# Patient Record
Sex: MALE | Race: ASIAN | NOT HISPANIC OR LATINO
[De-identification: names, ages, dates, MRNs, and addresses within clinical notes are randomized per-mention and may not be internally consistent; named-entity substitution may affect disease eponyms.]

---

## 2014-05-04 RX ORDER — ATORVASTATIN CALCIUM 80 MG/1
1 TABLET, FILM COATED ORAL
Qty: 0 | Refills: 0 | DISCHARGE
Start: 2014-05-04

## 2014-05-04 RX ORDER — METOPROLOL TARTRATE 50 MG
1 TABLET ORAL
Qty: 0 | Refills: 0 | DISCHARGE
Start: 2014-05-04

## 2017-02-06 ENCOUNTER — APPOINTMENT (OUTPATIENT)
Dept: VASCULAR SURGERY | Facility: CLINIC | Age: 52
End: 2017-02-06

## 2017-02-06 VITALS
HEART RATE: 70 BPM | WEIGHT: 250 LBS | HEIGHT: 68 IN | BODY MASS INDEX: 37.89 KG/M2 | DIASTOLIC BLOOD PRESSURE: 87 MMHG | TEMPERATURE: 98 F | SYSTOLIC BLOOD PRESSURE: 140 MMHG

## 2017-02-06 DIAGNOSIS — E11.9 TYPE 2 DIABETES MELLITUS W/OUT COMPLICATIONS: ICD-10-CM

## 2017-02-06 DIAGNOSIS — I25.10 ATHEROSCLEROTIC HEART DISEASE OF NATIVE CORONARY ARTERY W/OUT ANGINA PECTORIS: ICD-10-CM

## 2017-02-06 DIAGNOSIS — Z86.79 PERSONAL HISTORY OF OTHER DISEASES OF THE CIRCULATORY SYSTEM: ICD-10-CM

## 2017-02-06 DIAGNOSIS — M79.89 OTHER SPECIFIED SOFT TISSUE DISORDERS: ICD-10-CM

## 2017-02-06 DIAGNOSIS — E78.00 PURE HYPERCHOLESTEROLEMIA, UNSPECIFIED: ICD-10-CM

## 2017-02-06 DIAGNOSIS — F31.9 BIPOLAR DISORDER, UNSPECIFIED: ICD-10-CM

## 2023-01-11 ENCOUNTER — INPATIENT (INPATIENT)
Facility: HOSPITAL | Age: 58
LOS: 0 days | Discharge: ANOTHER HOSPITAL | End: 2023-01-12
Attending: INTERNAL MEDICINE | Admitting: INTERNAL MEDICINE
Payer: MEDICARE

## 2023-01-11 ENCOUNTER — TRANSCRIPTION ENCOUNTER (OUTPATIENT)
Age: 58
End: 2023-01-11

## 2023-01-11 VITALS
TEMPERATURE: 98 F | HEART RATE: 93 BPM | OXYGEN SATURATION: 96 % | DIASTOLIC BLOOD PRESSURE: 85 MMHG | RESPIRATION RATE: 18 BRPM | SYSTOLIC BLOOD PRESSURE: 149 MMHG

## 2023-01-11 DIAGNOSIS — R94.39 ABNORMAL RESULT OF OTHER CARDIOVASCULAR FUNCTION STUDY: ICD-10-CM

## 2023-01-11 LAB
ANION GAP SERPL CALC-SCNC: 11 MMOL/L — SIGNIFICANT CHANGE UP (ref 7–14)
BUN SERPL-MCNC: 13 MG/DL — SIGNIFICANT CHANGE UP (ref 7–23)
CALCIUM SERPL-MCNC: 9.3 MG/DL — SIGNIFICANT CHANGE UP (ref 8.4–10.5)
CHLORIDE SERPL-SCNC: 103 MMOL/L — SIGNIFICANT CHANGE UP (ref 98–107)
CO2 SERPL-SCNC: 26 MMOL/L — SIGNIFICANT CHANGE UP (ref 22–31)
CREAT SERPL-MCNC: 0.66 MG/DL — SIGNIFICANT CHANGE UP (ref 0.5–1.3)
EGFR: 109 ML/MIN/1.73M2 — SIGNIFICANT CHANGE UP
GLUCOSE BLDC GLUCOMTR-MCNC: 135 MG/DL — HIGH (ref 70–99)
GLUCOSE BLDC GLUCOMTR-MCNC: 150 MG/DL — HIGH (ref 70–99)
GLUCOSE BLDC GLUCOMTR-MCNC: 205 MG/DL — HIGH (ref 70–99)
GLUCOSE BLDC GLUCOMTR-MCNC: 219 MG/DL — HIGH (ref 70–99)
GLUCOSE SERPL-MCNC: 214 MG/DL — HIGH (ref 70–99)
HCT VFR BLD CALC: 35.2 % — LOW (ref 39–50)
HGB BLD-MCNC: 11.3 G/DL — LOW (ref 13–17)
MCHC RBC-ENTMCNC: 27.6 PG — SIGNIFICANT CHANGE UP (ref 27–34)
MCHC RBC-ENTMCNC: 32.1 GM/DL — SIGNIFICANT CHANGE UP (ref 32–36)
MCV RBC AUTO: 85.9 FL — SIGNIFICANT CHANGE UP (ref 80–100)
NRBC # BLD: 0 /100 WBCS — SIGNIFICANT CHANGE UP (ref 0–0)
NRBC # FLD: 0 K/UL — SIGNIFICANT CHANGE UP (ref 0–0)
PA ADP PRP-ACNC: 62 PRU — LOW (ref 194–418)
PLATELET # BLD AUTO: 271 K/UL — SIGNIFICANT CHANGE UP (ref 150–400)
POTASSIUM SERPL-MCNC: 4.5 MMOL/L — SIGNIFICANT CHANGE UP (ref 3.5–5.3)
POTASSIUM SERPL-SCNC: 4.5 MMOL/L — SIGNIFICANT CHANGE UP (ref 3.5–5.3)
RBC # BLD: 4.1 M/UL — LOW (ref 4.2–5.8)
RBC # FLD: 15 % — HIGH (ref 10.3–14.5)
SODIUM SERPL-SCNC: 140 MMOL/L — SIGNIFICANT CHANGE UP (ref 135–145)
WBC # BLD: 6.9 K/UL — SIGNIFICANT CHANGE UP (ref 3.8–10.5)
WBC # FLD AUTO: 6.9 K/UL — SIGNIFICANT CHANGE UP (ref 3.8–10.5)

## 2023-01-11 PROCEDURE — 93010 ELECTROCARDIOGRAM REPORT: CPT

## 2023-01-11 RX ORDER — DEXTROSE 50 % IN WATER 50 %
15 SYRINGE (ML) INTRAVENOUS ONCE
Refills: 0 | Status: DISCONTINUED | OUTPATIENT
Start: 2023-01-11 | End: 2023-01-12

## 2023-01-11 RX ORDER — METFORMIN HYDROCHLORIDE 850 MG/1
1 TABLET ORAL
Qty: 0 | Refills: 0 | DISCHARGE

## 2023-01-11 RX ORDER — GLUCAGON INJECTION, SOLUTION 0.5 MG/.1ML
1 INJECTION, SOLUTION SUBCUTANEOUS ONCE
Refills: 0 | Status: DISCONTINUED | OUTPATIENT
Start: 2023-01-11 | End: 2023-01-12

## 2023-01-11 RX ORDER — PANTOPRAZOLE SODIUM 20 MG/1
40 TABLET, DELAYED RELEASE ORAL
Refills: 0 | Status: DISCONTINUED | OUTPATIENT
Start: 2023-01-11 | End: 2023-01-12

## 2023-01-11 RX ORDER — DEXTROSE 50 % IN WATER 50 %
25 SYRINGE (ML) INTRAVENOUS ONCE
Refills: 0 | Status: DISCONTINUED | OUTPATIENT
Start: 2023-01-11 | End: 2023-01-12

## 2023-01-11 RX ORDER — ASPIRIN/CALCIUM CARB/MAGNESIUM 324 MG
81 TABLET ORAL DAILY
Refills: 0 | Status: DISCONTINUED | OUTPATIENT
Start: 2023-01-11 | End: 2023-01-12

## 2023-01-11 RX ORDER — FAMOTIDINE 10 MG/ML
1 INJECTION INTRAVENOUS
Qty: 0 | Refills: 0 | DISCHARGE

## 2023-01-11 RX ORDER — ATORVASTATIN CALCIUM 80 MG/1
40 TABLET, FILM COATED ORAL AT BEDTIME
Refills: 0 | Status: DISCONTINUED | OUTPATIENT
Start: 2023-01-11 | End: 2023-01-12

## 2023-01-11 RX ORDER — INSULIN GLARGINE 100 [IU]/ML
68 INJECTION, SOLUTION SUBCUTANEOUS AT BEDTIME
Refills: 0 | Status: DISCONTINUED | OUTPATIENT
Start: 2023-01-11 | End: 2023-01-11

## 2023-01-11 RX ORDER — INSULIN GLARGINE 100 [IU]/ML
68 INJECTION, SOLUTION SUBCUTANEOUS
Qty: 0 | Refills: 0 | DISCHARGE

## 2023-01-11 RX ORDER — INSULIN LISPRO 100/ML
VIAL (ML) SUBCUTANEOUS AT BEDTIME
Refills: 0 | Status: DISCONTINUED | OUTPATIENT
Start: 2023-01-11 | End: 2023-01-12

## 2023-01-11 RX ORDER — PRASUGREL 5 MG/1
1 TABLET, FILM COATED ORAL
Qty: 0 | Refills: 0 | DISCHARGE

## 2023-01-11 RX ORDER — ZOLPIDEM TARTRATE 10 MG/1
1 TABLET ORAL
Qty: 0 | Refills: 0 | DISCHARGE

## 2023-01-11 RX ORDER — SODIUM CHLORIDE 9 MG/ML
1000 INJECTION, SOLUTION INTRAVENOUS
Refills: 0 | Status: DISCONTINUED | OUTPATIENT
Start: 2023-01-11 | End: 2023-01-12

## 2023-01-11 RX ORDER — QUETIAPINE FUMARATE 200 MG/1
1 TABLET, FILM COATED ORAL
Qty: 0 | Refills: 0 | DISCHARGE
Start: 2023-01-11

## 2023-01-11 RX ORDER — DIVALPROEX SODIUM 500 MG/1
1500 TABLET, DELAYED RELEASE ORAL AT BEDTIME
Refills: 0 | Status: DISCONTINUED | OUTPATIENT
Start: 2023-01-11 | End: 2023-01-12

## 2023-01-11 RX ORDER — SODIUM CHLORIDE 9 MG/ML
3 INJECTION INTRAMUSCULAR; INTRAVENOUS; SUBCUTANEOUS EVERY 8 HOURS
Refills: 0 | Status: DISCONTINUED | OUTPATIENT
Start: 2023-01-11 | End: 2023-01-12

## 2023-01-11 RX ORDER — ESCITALOPRAM OXALATE 10 MG/1
10 TABLET, FILM COATED ORAL AT BEDTIME
Refills: 0 | Status: DISCONTINUED | OUTPATIENT
Start: 2023-01-11 | End: 2023-01-12

## 2023-01-11 RX ORDER — INSULIN LISPRO 100/ML
VIAL (ML) SUBCUTANEOUS
Refills: 0 | Status: DISCONTINUED | OUTPATIENT
Start: 2023-01-11 | End: 2023-01-12

## 2023-01-11 RX ORDER — INSULIN LISPRO 100/ML
0 VIAL (ML) SUBCUTANEOUS
Qty: 0 | Refills: 0 | DISCHARGE
Start: 2023-01-11

## 2023-01-11 RX ORDER — QUETIAPINE FUMARATE 200 MG/1
400 TABLET, FILM COATED ORAL AT BEDTIME
Refills: 0 | Status: DISCONTINUED | OUTPATIENT
Start: 2023-01-11 | End: 2023-01-12

## 2023-01-11 RX ORDER — DAPAGLIFLOZIN 10 MG/1
1 TABLET, FILM COATED ORAL
Qty: 0 | Refills: 0 | DISCHARGE

## 2023-01-11 RX ORDER — INSULIN LISPRO 100/ML
17 VIAL (ML) SUBCUTANEOUS
Qty: 0 | Refills: 0 | DISCHARGE
Start: 2023-01-11

## 2023-01-11 RX ORDER — INSULIN ASPART 100 [IU]/ML
10 INJECTION, SOLUTION SUBCUTANEOUS
Qty: 0 | Refills: 0 | DISCHARGE

## 2023-01-11 RX ORDER — HALOPERIDOL DECANOATE 100 MG/ML
20 INJECTION INTRAMUSCULAR AT BEDTIME
Refills: 0 | Status: DISCONTINUED | OUTPATIENT
Start: 2023-01-11 | End: 2023-01-12

## 2023-01-11 RX ORDER — QUETIAPINE FUMARATE 200 MG/1
2 TABLET, FILM COATED ORAL
Qty: 0 | Refills: 0 | DISCHARGE

## 2023-01-11 RX ORDER — PANTOPRAZOLE SODIUM 20 MG/1
1 TABLET, DELAYED RELEASE ORAL
Qty: 0 | Refills: 0 | DISCHARGE

## 2023-01-11 RX ORDER — FUROSEMIDE 40 MG
20 TABLET ORAL AT BEDTIME
Refills: 0 | Status: DISCONTINUED | OUTPATIENT
Start: 2023-01-11 | End: 2023-01-12

## 2023-01-11 RX ORDER — SODIUM CHLORIDE 9 MG/ML
500 INJECTION INTRAMUSCULAR; INTRAVENOUS; SUBCUTANEOUS
Refills: 0 | Status: DISCONTINUED | OUTPATIENT
Start: 2023-01-11 | End: 2023-01-12

## 2023-01-11 RX ORDER — BENZTROPINE MESYLATE 1 MG
2 TABLET ORAL
Refills: 0 | Status: DISCONTINUED | OUTPATIENT
Start: 2023-01-11 | End: 2023-01-12

## 2023-01-11 RX ORDER — METOPROLOL TARTRATE 50 MG
50 TABLET ORAL AT BEDTIME
Refills: 0 | Status: DISCONTINUED | OUTPATIENT
Start: 2023-01-11 | End: 2023-01-12

## 2023-01-11 RX ORDER — INSULIN GLARGINE 100 [IU]/ML
68 INJECTION, SOLUTION SUBCUTANEOUS
Qty: 0 | Refills: 0 | DISCHARGE
Start: 2023-01-11

## 2023-01-11 RX ORDER — INSULIN HUMAN 4-8-12(60)
16 KIT INHALATION
Qty: 0 | Refills: 0 | DISCHARGE

## 2023-01-11 RX ORDER — AMLODIPINE BESYLATE 2.5 MG/1
10 TABLET ORAL AT BEDTIME
Refills: 0 | Status: DISCONTINUED | OUTPATIENT
Start: 2023-01-11 | End: 2023-01-12

## 2023-01-11 RX ORDER — FAMOTIDINE 10 MG/ML
20 INJECTION INTRAVENOUS AT BEDTIME
Refills: 0 | Status: DISCONTINUED | OUTPATIENT
Start: 2023-01-11 | End: 2023-01-12

## 2023-01-11 RX ORDER — DEXTROSE 50 % IN WATER 50 %
12.5 SYRINGE (ML) INTRAVENOUS ONCE
Refills: 0 | Status: DISCONTINUED | OUTPATIENT
Start: 2023-01-11 | End: 2023-01-12

## 2023-01-11 RX ORDER — INSULIN GLARGINE 100 [IU]/ML
60 INJECTION, SOLUTION SUBCUTANEOUS AT BEDTIME
Refills: 0 | Status: DISCONTINUED | OUTPATIENT
Start: 2023-01-11 | End: 2023-01-12

## 2023-01-11 RX ADMIN — ESCITALOPRAM OXALATE 10 MILLIGRAM(S): 10 TABLET, FILM COATED ORAL at 22:06

## 2023-01-11 RX ADMIN — HALOPERIDOL DECANOATE 20 MILLIGRAM(S): 100 INJECTION INTRAMUSCULAR at 22:05

## 2023-01-11 RX ADMIN — SODIUM CHLORIDE 3 MILLILITER(S): 9 INJECTION INTRAMUSCULAR; INTRAVENOUS; SUBCUTANEOUS at 15:45

## 2023-01-11 RX ADMIN — SODIUM CHLORIDE 3 MILLILITER(S): 9 INJECTION INTRAMUSCULAR; INTRAVENOUS; SUBCUTANEOUS at 22:00

## 2023-01-11 RX ADMIN — DIVALPROEX SODIUM 1500 MILLIGRAM(S): 500 TABLET, DELAYED RELEASE ORAL at 22:01

## 2023-01-11 RX ADMIN — FAMOTIDINE 20 MILLIGRAM(S): 10 INJECTION INTRAVENOUS at 22:04

## 2023-01-11 RX ADMIN — ATORVASTATIN CALCIUM 40 MILLIGRAM(S): 80 TABLET, FILM COATED ORAL at 22:00

## 2023-01-11 RX ADMIN — SODIUM CHLORIDE 75 MILLILITER(S): 9 INJECTION INTRAMUSCULAR; INTRAVENOUS; SUBCUTANEOUS at 15:45

## 2023-01-11 RX ADMIN — Medication 20 MILLIGRAM(S): at 22:03

## 2023-01-11 RX ADMIN — INSULIN GLARGINE 60 UNIT(S): 100 INJECTION, SOLUTION SUBCUTANEOUS at 22:35

## 2023-01-11 RX ADMIN — QUETIAPINE FUMARATE 400 MILLIGRAM(S): 200 TABLET, FILM COATED ORAL at 22:04

## 2023-01-11 RX ADMIN — Medication 50 MILLIGRAM(S): at 22:03

## 2023-01-11 RX ADMIN — Medication 2 MILLIGRAM(S): at 22:15

## 2023-01-11 RX ADMIN — AMLODIPINE BESYLATE 10 MILLIGRAM(S): 2.5 TABLET ORAL at 22:01

## 2023-01-11 NOTE — DISCHARGE NOTE PROVIDER - NSDCFUSCHEDAPPT_GEN_ALL_CORE_FT
Chuck Hou  [facility]  CTS[address]  Scheduled Appointment: 01/17/2023

## 2023-01-11 NOTE — DISCHARGE NOTE PROVIDER - NSDCMRMEDTOKEN_GEN_ALL_CORE_FT
amLODIPine 10 mg oral tablet: 1 tab(s) orally once a day (at bedtime)  aspirin 81 mg oral tablet: 1 tab(s) orally once a day (at bedtime)  atorvastatin 40 mg oral tablet: 1 tab(s) orally once a day (at bedtime)  benztropine 2 mg oral tablet: 1 tab(s) orally 2 times a day  Depakote 500 mg oral delayed release tablet: 3 tab(s) orally once a day (at bedtime)  escitalopram 10 mg oral tablet: 1 tab(s) orally once a day (at bedtime)  famotidine 20 mg oral tablet: 1 tab(s) orally once a day (at bedtime)  haloperidol 10 mg oral tablet: 2 tab(s) orally once a day (at bedtime)  insulin glargine 100 units/mL subcutaneous solution: 68 unit(s) subcutaneous once a day (at bedtime)  insulin lispro 100 units/mL injectable solution:  injectable sliding scale  Lasix 20 mg oral tablet: 1 tab(s) orally once a day (at bedtime)  Metoprolol Succinate ER 50 mg oral tablet, extended release: 1 tab(s) orally once a day (at bedtime)  pantoprazole 40 mg oral delayed release tablet: 1 tab(s) orally once a day (at bedtime)  QUEtiapine 400 mg oral tablet: 1 tab(s) orally once a day (at bedtime)

## 2023-01-11 NOTE — H&P CARDIOLOGY - NSICDXPASTMEDICALHX_GEN_ALL_CORE_FT
PAST MEDICAL HISTORY:  Bipolar Disorder     Diabetes     History of Seizure Disorder patient denies    HTN (Hypertension)     Hyperlipidemia     MI, old     Sleep apnea cannot tolerate CPAP    Stented coronary artery RCA

## 2023-01-11 NOTE — DISCHARGE NOTE PROVIDER - HOSPITAL COURSE
57 year old morbidly obese male with HTN, hyperlipidemia, DM type 2, bipolar disorder, SHIN s/p uvula resection not on CPAP,  known CAD with MI with RCA/OM stents 2013 who presented to his Cardiologist complaining of progressive SOB over the past 1month. Abnormal stress test. Referred for elective cardiac catheterization. 1/11 CATH: EF 65, pLAD 90, mLAD 20, dLAD 80, OM 99, mRCA 95; RRA access  Recommended for CABG. Plan to transfer to [facility].

## 2023-01-11 NOTE — H&P CARDIOLOGY - COMMENTS
no pre cath hydration protocol initiated as patient with history of diastolic CHF on Lasix therapy with new symptoms of SOB over past 1 month

## 2023-01-11 NOTE — CHART NOTE - NSCHARTNOTEFT_GEN_A_CORE
LUIS A REZA   56yo Male s/p cath - Right radial site check. Dressing is clean, dry, & intact. Site is without hematoma or active bleeding.   Patient denies any pain, numbness, tingling, CP, SOB. VSS. Will continue to monitor. Pt to be transferred to [facility] for CTS CABG evaluation.    Vital Signs Last 24 Hrs  T(C): 36.8 (11 Jan 2023 19:10), Max: 36.9 (11 Jan 2023 18:58)  T(F): 98.3 (11 Jan 2023 19:10), Max: 98.4 (11 Jan 2023 18:58)  HR: 97 (11 Jan 2023 19:10) (93 - 97)  BP: 160/90 (11 Jan 2023 19:10) (149/85 - 160/90)  RR: 18 (11 Jan 2023 19:10) (18 - 18)  SpO2: 97% (11 Jan 2023 19:10) (96% - 97%)  Parameters below as of 11 Jan 2023 19:10  Patient On (Oxygen Delivery Method): RA    Right radial & ulnar pulses palpable, cap refill <2 sec.       Brent Sibley PA-C  Department of Medicine - Night Coverage  [facility]  In House Pager [phone]

## 2023-01-11 NOTE — DISCHARGE NOTE PROVIDER - NSDCCPCAREPLAN_GEN_ALL_CORE_FT
PRINCIPAL DISCHARGE DIAGNOSIS  Diagnosis: 3-vessel CAD  Assessment and Plan of Treatment: Transfter to [facility] for CABG evaluation      SECONDARY DISCHARGE DIAGNOSES  Diagnosis: Hypertension  Assessment and Plan of Treatment: COntinue medications for high blood pressure    Diagnosis: Hyperlipidemia  Assessment and Plan of Treatment: Continue atorvastatin    Diagnosis: Diabetes  Assessment and Plan of Treatment: Hold home meds for now and continue insulin while in the hospital

## 2023-01-11 NOTE — H&P CARDIOLOGY - NSICDXFAMILYHX_GEN_ALL_CORE_FT
FAMILY HISTORY:  Father  Still living? No  Family history of prostate cancer, Age at diagnosis: Age Unknown    Mother  Still living? No  Family history of heart attack, Age at diagnosis: Age Unknown

## 2023-01-11 NOTE — H&P CARDIOLOGY - NSICDXPASTSURGICALHX_GEN_ALL_CORE_FT
PAST SURGICAL HISTORY:  SHIN (obstructive sleep apnea) s/p surgery (uvula ressection), cannot tolerate CPAP

## 2023-01-11 NOTE — H&P CARDIOLOGY - HISTORY OF PRESENT ILLNESS
57 year old morbidly obese male with HTN, hyperlipidemia, DM type 2, seizure disorder, SHIN on CPAP, known CAD with MI who presented to his Cardiologist complaining of     In light of patients cardiac risk factors, symptoms and abnormal noninvasive test findings there is high suspicion for CAD. Patient is now referred to [facility] for a cardiac catheterization with possible PTCA/stent.     Denies fever, cough, chills, headache, flu like symptoms, sick contact or recent travel  COVID PCR 1/7/23 57 year old morbidly obese male with HTN, hyperlipidemia, DM type 2, bipolar disorder, SHIN s/p uvula resection not on CPAP,  known CAD with MI with RCA/OM stents 2013 who presented to his Cardiologist complaining of progressive SOB over the past 1month. Patient describes his symptoms as SOB walking less than 1block, relieved with rest.  Denies dizziness, syncope, chest pain, orthopnea, PND, edema. Underwent a Nuclear stress test 12/3/2022 revealing EF 52% with large evidence of ischemia in the inferior wall.    Admits to daily compliance with his medications, however, takes everything once a day at night even if prescribed dosing more than 1 a day, he takes the full dose together at night.   In light of patients known CAD history, symptoms and abnormal noninvasive test findings there is high suspicion for CAD progression. Patient is now referred to [facility] for a cardiac catheterization with possible PTCA/stent.     Denies fever, cough, chills, headache, flu like symptoms, sick contact or recent travel  COVID PCR 1/7/23

## 2023-01-11 NOTE — H&P CARDIOLOGY - VASCULAR
Patient was informed of results and agrees with plan. Encouraged to follow up with any other questions or concerns.     detailed exam

## 2023-01-11 NOTE — PATIENT PROFILE ADULT - FALL HARM RISK - HARM RISK INTERVENTIONS
Assistance with ambulation/Assistance OOB with selected safe patient handling equipment/Communicate Risk of Fall with Harm to all staff/Monitor for mental status changes/Monitor gait and stability/Move patient closer to nurses' station/Reinforce activity limits and safety measures with patient and family/Reorient to person, place and time as needed/Review medications for side effects contributing to fall risk/Tailored Fall Risk Interventions/Visual Cue: Yellow wristband and red socks/Bed in lowest position, wheels locked, appropriate side rails in place/Call bell, personal items and telephone in reach/Instruct patient to call for assistance before getting out of bed or chair/Non-slip footwear when patient is out of bed/[place] to call system/Physically safe environment - no spills, clutter or unnecessary equipment/Purposeful Proactive Rounding/Room/bathroom lighting operational, light cord in reach

## 2023-01-12 ENCOUNTER — INPATIENT (INPATIENT)
Facility: HOSPITAL | Age: 58
LOS: 10 days | Discharge: HOME HEALTH | DRG: 235 | End: 2023-01-23
Attending: THORACIC SURGERY (CARDIOTHORACIC VASCULAR SURGERY) | Admitting: THORACIC SURGERY (CARDIOTHORACIC VASCULAR SURGERY)
Payer: MEDICARE

## 2023-01-12 VITALS
RESPIRATION RATE: 18 BRPM | HEART RATE: 74 BPM | DIASTOLIC BLOOD PRESSURE: 88 MMHG | OXYGEN SATURATION: 97 % | SYSTOLIC BLOOD PRESSURE: 138 MMHG | TEMPERATURE: 98 F

## 2023-01-12 VITALS
DIASTOLIC BLOOD PRESSURE: 84 MMHG | OXYGEN SATURATION: 94 % | HEART RATE: 79 BPM | SYSTOLIC BLOOD PRESSURE: 134 MMHG | WEIGHT: 254.85 LBS | RESPIRATION RATE: 18 BRPM | HEIGHT: 68 IN | TEMPERATURE: 98 F

## 2023-01-12 DIAGNOSIS — F31.9 BIPOLAR DISORDER, UNSPECIFIED: ICD-10-CM

## 2023-01-12 DIAGNOSIS — E78.5 HYPERLIPIDEMIA, UNSPECIFIED: ICD-10-CM

## 2023-01-12 DIAGNOSIS — I25.3 ANEURYSM OF HEART: ICD-10-CM

## 2023-01-12 DIAGNOSIS — E11.9 TYPE 2 DIABETES MELLITUS WITHOUT COMPLICATIONS: ICD-10-CM

## 2023-01-12 DIAGNOSIS — I25.10 ATHEROSCLEROTIC HEART DISEASE OF NATIVE CORONARY ARTERY WITHOUT ANGINA PECTORIS: ICD-10-CM

## 2023-01-12 DIAGNOSIS — I10 ESSENTIAL (PRIMARY) HYPERTENSION: ICD-10-CM

## 2023-01-12 LAB
A1C WITH ESTIMATED AVERAGE GLUCOSE RESULT: 8.2 % — HIGH (ref 4–5.6)
A1C WITH ESTIMATED AVERAGE GLUCOSE RESULT: 8.4 % — HIGH (ref 4–5.6)
ALBUMIN SERPL ELPH-MCNC: 4 G/DL — SIGNIFICANT CHANGE UP (ref 3.3–5)
ALP SERPL-CCNC: 87 U/L — SIGNIFICANT CHANGE UP (ref 40–120)
ALT FLD-CCNC: 19 U/L — SIGNIFICANT CHANGE UP (ref 10–45)
ANION GAP SERPL CALC-SCNC: 14 MMOL/L — SIGNIFICANT CHANGE UP (ref 7–14)
ANION GAP SERPL CALC-SCNC: 16 MMOL/L — SIGNIFICANT CHANGE UP (ref 5–17)
APPEARANCE UR: CLEAR — SIGNIFICANT CHANGE UP
APTT BLD: 29.1 SEC — SIGNIFICANT CHANGE UP (ref 27.5–35.5)
AST SERPL-CCNC: 18 U/L — SIGNIFICANT CHANGE UP (ref 10–40)
BASOPHILS # BLD AUTO: 0.02 K/UL — SIGNIFICANT CHANGE UP (ref 0–0.2)
BASOPHILS NFR BLD AUTO: 0.3 % — SIGNIFICANT CHANGE UP (ref 0–2)
BILIRUB SERPL-MCNC: 0.3 MG/DL — SIGNIFICANT CHANGE UP (ref 0.2–1.2)
BILIRUB UR-MCNC: NEGATIVE — SIGNIFICANT CHANGE UP
BLD GP AB SCN SERPL QL: NEGATIVE — SIGNIFICANT CHANGE UP
BUN SERPL-MCNC: 14 MG/DL — SIGNIFICANT CHANGE UP (ref 7–23)
BUN SERPL-MCNC: 15 MG/DL — SIGNIFICANT CHANGE UP (ref 7–23)
CALCIUM SERPL-MCNC: 9.1 MG/DL — SIGNIFICANT CHANGE UP (ref 8.4–10.5)
CALCIUM SERPL-MCNC: 9.6 MG/DL — SIGNIFICANT CHANGE UP (ref 8.4–10.5)
CHLORIDE SERPL-SCNC: 98 MMOL/L — SIGNIFICANT CHANGE UP (ref 96–108)
CHLORIDE SERPL-SCNC: 99 MMOL/L — SIGNIFICANT CHANGE UP (ref 98–107)
CHOLEST SERPL-MCNC: 182 MG/DL — SIGNIFICANT CHANGE UP
CO2 SERPL-SCNC: 21 MMOL/L — LOW (ref 22–31)
CO2 SERPL-SCNC: 23 MMOL/L — SIGNIFICANT CHANGE UP (ref 22–31)
COLOR SPEC: SIGNIFICANT CHANGE UP
CREAT SERPL-MCNC: 0.67 MG/DL — SIGNIFICANT CHANGE UP (ref 0.5–1.3)
CREAT SERPL-MCNC: 0.68 MG/DL — SIGNIFICANT CHANGE UP (ref 0.5–1.3)
DIFF PNL FLD: NEGATIVE — SIGNIFICANT CHANGE UP
EGFR: 108 ML/MIN/1.73M2 — SIGNIFICANT CHANGE UP
EGFR: 109 ML/MIN/1.73M2 — SIGNIFICANT CHANGE UP
EOSINOPHIL # BLD AUTO: 0.11 K/UL — SIGNIFICANT CHANGE UP (ref 0–0.5)
EOSINOPHIL NFR BLD AUTO: 1.5 % — SIGNIFICANT CHANGE UP (ref 0–6)
ESTIMATED AVERAGE GLUCOSE: 189 MG/DL — HIGH (ref 68–114)
ESTIMATED AVERAGE GLUCOSE: 194 — SIGNIFICANT CHANGE UP
FIBRINOGEN PPP-MCNC: 324 MG/DL — SIGNIFICANT CHANGE UP (ref 200–445)
GLUCOSE BLDC GLUCOMTR-MCNC: 162 MG/DL — HIGH (ref 70–99)
GLUCOSE BLDC GLUCOMTR-MCNC: 188 MG/DL — HIGH (ref 70–99)
GLUCOSE BLDC GLUCOMTR-MCNC: 246 MG/DL — HIGH (ref 70–99)
GLUCOSE SERPL-MCNC: 179 MG/DL — HIGH (ref 70–99)
GLUCOSE SERPL-MCNC: 220 MG/DL — HIGH (ref 70–99)
GLUCOSE UR QL: ABNORMAL
HCT VFR BLD CALC: 35.7 % — LOW (ref 39–50)
HCT VFR BLD CALC: 36.7 % — LOW (ref 39–50)
HDLC SERPL-MCNC: 42 MG/DL — SIGNIFICANT CHANGE UP
HGB BLD-MCNC: 11.5 G/DL — LOW (ref 13–17)
HGB BLD-MCNC: 11.7 G/DL — LOW (ref 13–17)
IMM GRANULOCYTES NFR BLD AUTO: 0.8 % — SIGNIFICANT CHANGE UP (ref 0–0.9)
INR BLD: 1.06 RATIO — SIGNIFICANT CHANGE UP (ref 0.88–1.16)
KETONES UR-MCNC: SIGNIFICANT CHANGE UP
LEUKOCYTE ESTERASE UR-ACNC: NEGATIVE — SIGNIFICANT CHANGE UP
LIPID PNL WITH DIRECT LDL SERPL: 99 MG/DL — SIGNIFICANT CHANGE UP
LYMPHOCYTES # BLD AUTO: 2.85 K/UL — SIGNIFICANT CHANGE UP (ref 1–3.3)
LYMPHOCYTES # BLD AUTO: 38.5 % — SIGNIFICANT CHANGE UP (ref 13–44)
MCHC RBC-ENTMCNC: 27.3 PG — SIGNIFICANT CHANGE UP (ref 27–34)
MCHC RBC-ENTMCNC: 27.5 PG — SIGNIFICANT CHANGE UP (ref 27–34)
MCHC RBC-ENTMCNC: 31.9 GM/DL — LOW (ref 32–36)
MCHC RBC-ENTMCNC: 32.2 GM/DL — SIGNIFICANT CHANGE UP (ref 32–36)
MCV RBC AUTO: 84.8 FL — SIGNIFICANT CHANGE UP (ref 80–100)
MCV RBC AUTO: 86.4 FL — SIGNIFICANT CHANGE UP (ref 80–100)
MONOCYTES # BLD AUTO: 0.83 K/UL — SIGNIFICANT CHANGE UP (ref 0–0.9)
MONOCYTES NFR BLD AUTO: 11.2 % — SIGNIFICANT CHANGE UP (ref 2–14)
MRSA PCR RESULT.: SIGNIFICANT CHANGE UP
NEUTROPHILS # BLD AUTO: 3.53 K/UL — SIGNIFICANT CHANGE UP (ref 1.8–7.4)
NEUTROPHILS NFR BLD AUTO: 47.7 % — SIGNIFICANT CHANGE UP (ref 43–77)
NITRITE UR-MCNC: NEGATIVE — SIGNIFICANT CHANGE UP
NON HDL CHOLESTEROL: 140 MG/DL — HIGH
NRBC # BLD: 0 /100 WBCS — SIGNIFICANT CHANGE UP (ref 0–0)
NRBC # BLD: 0 /100 WBCS — SIGNIFICANT CHANGE UP (ref 0–0)
NRBC # FLD: 0 K/UL — SIGNIFICANT CHANGE UP (ref 0–0)
NT-PROBNP SERPL-SCNC: 52 PG/ML — SIGNIFICANT CHANGE UP (ref 0–300)
PA ADP PRP-ACNC: 99 PRU — LOW (ref 194–417)
PH UR: 6.5 — SIGNIFICANT CHANGE UP (ref 5–8)
PLATELET # BLD AUTO: 264 K/UL — SIGNIFICANT CHANGE UP (ref 150–400)
PLATELET # BLD AUTO: 269 K/UL — SIGNIFICANT CHANGE UP (ref 150–400)
POTASSIUM SERPL-MCNC: 3.7 MMOL/L — SIGNIFICANT CHANGE UP (ref 3.5–5.3)
POTASSIUM SERPL-MCNC: 3.9 MMOL/L — SIGNIFICANT CHANGE UP (ref 3.5–5.3)
POTASSIUM SERPL-SCNC: 3.7 MMOL/L — SIGNIFICANT CHANGE UP (ref 3.5–5.3)
POTASSIUM SERPL-SCNC: 3.9 MMOL/L — SIGNIFICANT CHANGE UP (ref 3.5–5.3)
PREALB SERPL-MCNC: 27 MG/DL — SIGNIFICANT CHANGE UP (ref 20–40)
PROT SERPL-MCNC: 7.6 G/DL — SIGNIFICANT CHANGE UP (ref 6–8.3)
PROT UR-MCNC: NEGATIVE — SIGNIFICANT CHANGE UP
PROTHROM AB SERPL-ACNC: 12.2 SEC — SIGNIFICANT CHANGE UP (ref 10.5–13.4)
RBC # BLD: 4.21 M/UL — SIGNIFICANT CHANGE UP (ref 4.2–5.8)
RBC # BLD: 4.25 M/UL — SIGNIFICANT CHANGE UP (ref 4.2–5.8)
RBC # FLD: 15 % — HIGH (ref 10.3–14.5)
RBC # FLD: 15.1 % — HIGH (ref 10.3–14.5)
RH IG SCN BLD-IMP: POSITIVE — SIGNIFICANT CHANGE UP
RH IG SCN BLD-IMP: POSITIVE — SIGNIFICANT CHANGE UP
S AUREUS DNA NOSE QL NAA+PROBE: SIGNIFICANT CHANGE UP
SODIUM SERPL-SCNC: 135 MMOL/L — SIGNIFICANT CHANGE UP (ref 135–145)
SODIUM SERPL-SCNC: 136 MMOL/L — SIGNIFICANT CHANGE UP (ref 135–145)
SP GR SPEC: 1.03 — HIGH (ref 1.01–1.02)
T4 FREE SERPL-MCNC: 1.1 NG/DL — SIGNIFICANT CHANGE UP (ref 0.9–1.8)
TRIGL SERPL-MCNC: 204 MG/DL — HIGH
TSH SERPL-MCNC: 6.69 UIU/ML — HIGH (ref 0.27–4.2)
UROBILINOGEN FLD QL: NEGATIVE — SIGNIFICANT CHANGE UP
WBC # BLD: 7.4 K/UL — SIGNIFICANT CHANGE UP (ref 3.8–10.5)
WBC # BLD: 8.55 K/UL — SIGNIFICANT CHANGE UP (ref 3.8–10.5)
WBC # FLD AUTO: 7.4 K/UL — SIGNIFICANT CHANGE UP (ref 3.8–10.5)
WBC # FLD AUTO: 8.55 K/UL — SIGNIFICANT CHANGE UP (ref 3.8–10.5)

## 2023-01-12 PROCEDURE — 93010 ELECTROCARDIOGRAM REPORT: CPT

## 2023-01-12 PROCEDURE — 71045 X-RAY EXAM CHEST 1 VIEW: CPT | Mod: 26

## 2023-01-12 PROCEDURE — 93010 ELECTROCARDIOGRAM REPORT: CPT | Mod: 77

## 2023-01-12 PROCEDURE — 93306 TTE W/DOPPLER COMPLETE: CPT | Mod: 26

## 2023-01-12 PROCEDURE — 93880 EXTRACRANIAL BILAT STUDY: CPT | Mod: 26

## 2023-01-12 PROCEDURE — 99221 1ST HOSP IP/OBS SF/LOW 40: CPT | Mod: FS

## 2023-01-12 RX ORDER — INSULIN GLARGINE 100 [IU]/ML
60 INJECTION, SOLUTION SUBCUTANEOUS AT BEDTIME
Refills: 0 | Status: DISCONTINUED | OUTPATIENT
Start: 2023-01-12 | End: 2023-01-13

## 2023-01-12 RX ORDER — ESCITALOPRAM OXALATE 10 MG/1
10 TABLET, FILM COATED ORAL AT BEDTIME
Refills: 0 | Status: DISCONTINUED | OUTPATIENT
Start: 2023-01-12 | End: 2023-01-17

## 2023-01-12 RX ORDER — DEXTROSE 50 % IN WATER 50 %
12.5 SYRINGE (ML) INTRAVENOUS ONCE
Refills: 0 | Status: DISCONTINUED | OUTPATIENT
Start: 2023-01-12 | End: 2023-01-17

## 2023-01-12 RX ORDER — DEXTROSE 50 % IN WATER 50 %
25 SYRINGE (ML) INTRAVENOUS ONCE
Refills: 0 | Status: DISCONTINUED | OUTPATIENT
Start: 2023-01-12 | End: 2023-01-17

## 2023-01-12 RX ORDER — METOPROLOL TARTRATE 50 MG
25 TABLET ORAL EVERY 12 HOURS
Refills: 0 | Status: DISCONTINUED | OUTPATIENT
Start: 2023-01-12 | End: 2023-01-17

## 2023-01-12 RX ORDER — INSULIN LISPRO 100/ML
5 VIAL (ML) SUBCUTANEOUS
Refills: 0 | Status: DISCONTINUED | OUTPATIENT
Start: 2023-01-12 | End: 2023-01-14

## 2023-01-12 RX ORDER — BENZTROPINE MESYLATE 1 MG
2 TABLET ORAL EVERY 12 HOURS
Refills: 0 | Status: DISCONTINUED | OUTPATIENT
Start: 2023-01-12 | End: 2023-01-17

## 2023-01-12 RX ORDER — SODIUM CHLORIDE 9 MG/ML
1000 INJECTION, SOLUTION INTRAVENOUS
Refills: 0 | Status: DISCONTINUED | OUTPATIENT
Start: 2023-01-12 | End: 2023-01-17

## 2023-01-12 RX ORDER — ACETAMINOPHEN 500 MG
650 TABLET ORAL EVERY 6 HOURS
Refills: 0 | Status: DISCONTINUED | OUTPATIENT
Start: 2023-01-12 | End: 2023-01-12

## 2023-01-12 RX ORDER — QUETIAPINE FUMARATE 200 MG/1
400 TABLET, FILM COATED ORAL AT BEDTIME
Refills: 0 | Status: DISCONTINUED | OUTPATIENT
Start: 2023-01-12 | End: 2023-01-17

## 2023-01-12 RX ORDER — GLUCAGON INJECTION, SOLUTION 0.5 MG/.1ML
1 INJECTION, SOLUTION SUBCUTANEOUS ONCE
Refills: 0 | Status: DISCONTINUED | OUTPATIENT
Start: 2023-01-12 | End: 2023-01-17

## 2023-01-12 RX ORDER — ENOXAPARIN SODIUM 100 MG/ML
120 INJECTION SUBCUTANEOUS EVERY 12 HOURS
Refills: 0 | Status: DISCONTINUED | OUTPATIENT
Start: 2023-01-13 | End: 2023-01-16

## 2023-01-12 RX ORDER — DEXTROSE 50 % IN WATER 50 %
15 SYRINGE (ML) INTRAVENOUS ONCE
Refills: 0 | Status: DISCONTINUED | OUTPATIENT
Start: 2023-01-12 | End: 2023-01-17

## 2023-01-12 RX ORDER — DIVALPROEX SODIUM 500 MG/1
1500 TABLET, DELAYED RELEASE ORAL AT BEDTIME
Refills: 0 | Status: DISCONTINUED | OUTPATIENT
Start: 2023-01-12 | End: 2023-01-12

## 2023-01-12 RX ORDER — DIVALPROEX SODIUM 500 MG/1
1500 TABLET, DELAYED RELEASE ORAL AT BEDTIME
Refills: 0 | Status: DISCONTINUED | OUTPATIENT
Start: 2023-01-12 | End: 2023-01-17

## 2023-01-12 RX ORDER — AMLODIPINE BESYLATE 2.5 MG/1
10 TABLET ORAL AT BEDTIME
Refills: 0 | Status: DISCONTINUED | OUTPATIENT
Start: 2023-01-12 | End: 2023-01-17

## 2023-01-12 RX ORDER — FAMOTIDINE 10 MG/ML
20 INJECTION INTRAVENOUS DAILY
Refills: 0 | Status: DISCONTINUED | OUTPATIENT
Start: 2023-01-12 | End: 2023-01-17

## 2023-01-12 RX ORDER — INSULIN LISPRO 100/ML
VIAL (ML) SUBCUTANEOUS AT BEDTIME
Refills: 0 | Status: DISCONTINUED | OUTPATIENT
Start: 2023-01-12 | End: 2023-01-17

## 2023-01-12 RX ORDER — ASPIRIN/CALCIUM CARB/MAGNESIUM 324 MG
81 TABLET ORAL DAILY
Refills: 0 | Status: DISCONTINUED | OUTPATIENT
Start: 2023-01-12 | End: 2023-01-17

## 2023-01-12 RX ORDER — SODIUM CHLORIDE 9 MG/ML
3 INJECTION INTRAMUSCULAR; INTRAVENOUS; SUBCUTANEOUS EVERY 8 HOURS
Refills: 0 | Status: DISCONTINUED | OUTPATIENT
Start: 2023-01-12 | End: 2023-01-17

## 2023-01-12 RX ORDER — INSULIN LISPRO 100/ML
VIAL (ML) SUBCUTANEOUS
Refills: 0 | Status: DISCONTINUED | OUTPATIENT
Start: 2023-01-12 | End: 2023-01-17

## 2023-01-12 RX ORDER — HALOPERIDOL DECANOATE 100 MG/ML
20 INJECTION INTRAMUSCULAR AT BEDTIME
Refills: 0 | Status: DISCONTINUED | OUTPATIENT
Start: 2023-01-12 | End: 2023-01-17

## 2023-01-12 RX ORDER — LANOLIN ALCOHOL/MO/W.PET/CERES
3 CREAM (GRAM) TOPICAL AT BEDTIME
Refills: 0 | Status: DISCONTINUED | OUTPATIENT
Start: 2023-01-12 | End: 2023-01-17

## 2023-01-12 RX ORDER — ATORVASTATIN CALCIUM 80 MG/1
80 TABLET, FILM COATED ORAL AT BEDTIME
Refills: 0 | Status: DISCONTINUED | OUTPATIENT
Start: 2023-01-12 | End: 2023-01-17

## 2023-01-12 RX ADMIN — Medication 81 MILLIGRAM(S): at 12:07

## 2023-01-12 RX ADMIN — Medication 25 MILLIGRAM(S): at 20:52

## 2023-01-12 RX ADMIN — Medication 2 MILLIGRAM(S): at 12:06

## 2023-01-12 RX ADMIN — Medication 25 MILLIGRAM(S): at 12:08

## 2023-01-12 RX ADMIN — HALOPERIDOL DECANOATE 20 MILLIGRAM(S): 100 INJECTION INTRAMUSCULAR at 20:51

## 2023-01-12 RX ADMIN — AMLODIPINE BESYLATE 10 MILLIGRAM(S): 2.5 TABLET ORAL at 20:52

## 2023-01-12 RX ADMIN — Medication 2 MILLIGRAM(S): at 20:51

## 2023-01-12 RX ADMIN — Medication 1: at 16:47

## 2023-01-12 RX ADMIN — SODIUM CHLORIDE 3 MILLILITER(S): 9 INJECTION INTRAMUSCULAR; INTRAVENOUS; SUBCUTANEOUS at 13:11

## 2023-01-12 RX ADMIN — INSULIN GLARGINE 60 UNIT(S): 100 INJECTION, SOLUTION SUBCUTANEOUS at 22:09

## 2023-01-12 RX ADMIN — DIVALPROEX SODIUM 1500 MILLIGRAM(S): 500 TABLET, DELAYED RELEASE ORAL at 20:50

## 2023-01-12 RX ADMIN — ATORVASTATIN CALCIUM 80 MILLIGRAM(S): 80 TABLET, FILM COATED ORAL at 20:52

## 2023-01-12 RX ADMIN — Medication 2: at 12:08

## 2023-01-12 RX ADMIN — SODIUM CHLORIDE 3 MILLILITER(S): 9 INJECTION INTRAMUSCULAR; INTRAVENOUS; SUBCUTANEOUS at 21:06

## 2023-01-12 RX ADMIN — ESCITALOPRAM OXALATE 10 MILLIGRAM(S): 10 TABLET, FILM COATED ORAL at 20:51

## 2023-01-12 RX ADMIN — Medication 5 UNIT(S): at 16:48

## 2023-01-12 RX ADMIN — QUETIAPINE FUMARATE 400 MILLIGRAM(S): 200 TABLET, FILM COATED ORAL at 20:51

## 2023-01-12 RX ADMIN — FAMOTIDINE 20 MILLIGRAM(S): 10 INJECTION INTRAVENOUS at 12:07

## 2023-01-12 RX ADMIN — Medication 3 MILLIGRAM(S): at 20:52

## 2023-01-12 NOTE — H&P ADULT - PA/NP ONLY VISIT
Sent message to patient via portal with our fax number. Informed patient that her Lyrica was sent to the pharmacy this week. Sent additional message requesting patient sent BS readings.  Lantus was increased to 40 u nightly following last office visit with Dr Young.     Dr Young - do you want to consider alternative to Tradjenta due to cost?  Or wait for BS readings?        PA/NP only visit

## 2023-01-12 NOTE — H&P ADULT - ASSESSMENT
Patient is a 57M /w Penicillin allergy, PMH  HTN, HLD, DM2, bipolar disorder, SHIN s/p uvula resection not on CPAP,  known CAD with MI with RCA/OM stents 2013 who presented to his Cardiologist complaining of progressive SOB/LEMOS over the last 4 weeks.  Denies having typical chest pain type symptoms.  Had abnormal stress test and underwent elective cath yesterday on 1/11/23 which revealed EF 65, pLAD 90, mLAD 20, dLAD 80, OM 99, mRCA 95 via RRA access.  Now transferred to [facility] for CABG eval /w Dr. Barrientos.  Currently patient denies chest pain/sob.  No headache/dizziness.  No abdominal pain/nausea/vomiting.  No bowel/urinary symptoms.  No lower extremity pain/numbness/tingling.  No tooth pain.  Patient states he typically ambulates independently without use of assistive devices and lives with his wife in [place].   Patient is a 57M /w Penicillin allergy, PMH  HTN, HLD, DM2, bipolar disorder, SHIN s/p uvula resection not on CPAP,  known CAD with MI with RCA/OM stents 2013 who presented to his Cardiologist complaining of progressive SOB/LEMOS over the last 4 weeks.  Denies having typical chest pain type symptoms.  Had abnormal stress test and underwent elective cath yesterday on 1/11/23 which revealed EF 65, pLAD 90, mLAD 20, dLAD 80, OM 99, mRCA 95 via RRA access.  Now transferred to [facility] for CABG eval /w Dr. Barrientos.  Currently patient denies chest pain/sob.  No headache/dizziness.  No abdominal pain/nausea/vomiting.  No bowel/urinary symptoms.  No lower extremity pain/numbness/tingling.  No tooth pain.  Patient states he typically ambulates independently without use of assistive devices and lives with his wife in [place].      Patient transferred to [facility] for CABG evaluation on 1/12/2023. Dr. Barrientos to review cath images/TTE.  Will initiate pre-op workup.  OR date pending pre-op evaluation

## 2023-01-12 NOTE — H&P ADULT - PROBLEM SELECTOR PLAN 1
Patient had cath 1/11 revealing severe TVD EF 65, pLAD 90, mLAD 20, dLAD 80, OM 99, mRCA 95 via RRA access    TX to [facility] for CABG eval with Dr. Hou    Pre-op eval initiated  Will obtain pre-op CABG lab work/imaging/diagnostics  Hold P2Y12 inhibitors (on Effient as outpatient per med rec)  Continue betablockade with Lopressor titrate as BP/HR allows  Will obtain TTE/Carotid Dopplers/CXR/EKG/UA/MRSA PCR

## 2023-01-12 NOTE — H&P ADULT - PROBLEM SELECTOR PLAN 3
Continue Norvasc 10mg QD Continue Norvasc 10mg QD  Lopressor 25mg Q12 ordered, pt on Toprol 50mg HS at home

## 2023-01-12 NOTE — H&P ADULT - HISTORY OF PRESENT ILLNESS
Patient is a 57M /w Penicillin allergy, PMH  HTN, HLD, DM2, bipolar disorder, SHIN s/p uvula resection not on CPAP,  known CAD with MI with RCA/OM stents 2013 who presented to his Cardiologist complaining of progressive SOB/LEMOS over the last 4 weeks.  Denies having typical chest pain type symptoms.  Had abnormal stress test and underwent elective cath yesterday on 1/11/23 which revealed EF 65, pLAD 90, mLAD 20, dLAD 80, OM 99, mRCA 95 via RRA access.  Now transferred to [facility] for CABG eval /w Dr. Barrientos.  Currently patient denies chest pain/sob.  No headache/dizziness.  No abdominal pain/nausea/vomiting.  No bowel/urinary symptoms.  No lower extremity pain/numbness/tingling.  No tooth pain.  Patient states he typically ambulates independently without use of assistive devices and lives with his wife in [place].

## 2023-01-12 NOTE — H&P ADULT - NSHPLABSRESULTS_GEN_ALL_CORE
Study Date:     2023   Name:           LUIS A REZA   :            1965 (57 years)   Gender:         male   MR#:            [id number]   [facility]#:           [id number]   Patient Class:  Outpatient     Cath Lab Report    Diagnostic Cardiologist:       Justin Cuellar MD   Fellow:                        Russell Anna, Fellow   Referring Physician:           Julián Miller     Procedures Performed   Procedures:              1.    Arterial Access - Right Radial     2.    Diagnostic Coronary Angiography     Primary ICD-10: R94.39 - Abnormal result of other cardiovascular  function study  Secondary ICD-10   I20.9 - Angina pectoris, unspecified   Diagnostic Conclusions:     Severe multivessel CAD in DM pt with Syntax Score 28   L. Main: distal 20%   LAD: Calcifed proximal 90%; mid 80% at diagonal 80% stenosis   LCx: mild diffuse disease; large OM 99% stenosis   RCA: mid 95% stenosis; distal 70% stenosis; heavily calcified vessel     LVEF 65% by TTE   Recommendations:     CT Surgery for CABG     General Impressions:   General Diagnostic:      General Diagnostic Impressions     There is mild left main disease. There is severe 3-vessel coronary  artery disease (LAD 90 %,  Circumflex 99 % and RCA 95 %).      Procedure Narrative:   The risks and alternatives of the procedures and conscious sedation  were explained to the patient and informed consent was  obtained. The patient was brought to the cath lab and placed on the  exam table.  Access   Right radial artery:   The puncture sitewas infiltrated with 2% Lidocaine. Vascular access  was obtained using modified seldinger technique.    Diagnostic Findings:     Coronary Angiography   The coronary circulation is co-dominant.      Patient: LUIS A REZA            MRN: [id number]  Study Date: 2023   02:22 PM      Page 1 of 3        LM   Left main artery: Angiography shows mild atherosclerosis. There is a  20 % stenosis. SMOOTH Flow 3.    LAD   Left anterior descending artery: Angiography shows severe  atherosclerosis. Thereis a 90 % stenosis. SMOOTH Flow 3.    CX   Circumflex: Angiography shows severe atherosclerosis. There is a 99 %  stenosis. SMOOTH Flow 3.    RCA   Right coronary artery: Angiography shows severe atherosclerosis. There  is a 95 % stenosis. SMOOTH Flow 3.

## 2023-01-12 NOTE — PATIENT PROFILE ADULT - FALL HARM RISK - HARM RISK INTERVENTIONS

## 2023-01-12 NOTE — H&P ADULT - NSHPSOCIALHISTORY_GEN_ALL_CORE
Patient lives with his wife in [place]    Does not ambulate with assistive devices.      No PPM/AICD.  No other implantable devices Patient lives with his wife in [place]    Does not ambulate with assistive devices.      No PPM/AICD.  No other implantable devices    Patient is a non-smoker/non-drinker.  Denies illicit drug use

## 2023-01-12 NOTE — H&P ADULT - NSHPREVIEWOFSYSTEMS_GEN_ALL_CORE
General:  no weakness, fatigue, fevers or chills  Skin: no itching, burning, rashes, or lesions   HEENT: no visual changes;  no headache, no vertigo, no recent colds, nasal stuffiness or sore throat   Neck: no pain, stiffness or swollen glands  Respiratory: +exertional SOB.  no cough, sputum, wheezing, hemoptysis;   Cardiovascular: no chest pain, dyspnea or palpitations  GI: no abdominal or epigastric pain. no heartburn, nausea, vomiting, or hematemesis; no diarrhea or constipation. no melena or hematochezia  : no dysuria, frequency or hematuria  Peripheral Vascular: no intermittent claudication, leg cramps, varicose veins, swelling or swelling with redness and tenderness  Musculoskeletal: Denies limitations of movement or paralysis,  Neuro: no changes in orientation, memory, insight or judgment, no changes in mood, attention or speech.  no dizziness, vertigo or fainting, numbness, tingling or weakness

## 2023-01-12 NOTE — H&P ADULT - PROBLEM SELECTOR PLAN 5
Continue home regimen Continue home regimen    Depakote 1500mg HS  Haldol 20mg HS  Seroquel 400mg HS  Cogentin 2mg Q12

## 2023-01-13 LAB
ALBUMIN SERPL ELPH-MCNC: 4 G/DL — SIGNIFICANT CHANGE UP (ref 3.3–5)
ALP SERPL-CCNC: 89 U/L — SIGNIFICANT CHANGE UP (ref 40–120)
ALT FLD-CCNC: 20 U/L — SIGNIFICANT CHANGE UP (ref 10–45)
ANION GAP SERPL CALC-SCNC: 14 MMOL/L — SIGNIFICANT CHANGE UP (ref 5–17)
AST SERPL-CCNC: 23 U/L — SIGNIFICANT CHANGE UP (ref 10–40)
BASOPHILS # BLD AUTO: 0.03 K/UL — SIGNIFICANT CHANGE UP (ref 0–0.2)
BASOPHILS NFR BLD AUTO: 0.4 % — SIGNIFICANT CHANGE UP (ref 0–2)
BILIRUB SERPL-MCNC: 0.4 MG/DL — SIGNIFICANT CHANGE UP (ref 0.2–1.2)
BUN SERPL-MCNC: 14 MG/DL — SIGNIFICANT CHANGE UP (ref 7–23)
CALCIUM SERPL-MCNC: 9.7 MG/DL — SIGNIFICANT CHANGE UP (ref 8.4–10.5)
CHLORIDE SERPL-SCNC: 101 MMOL/L — SIGNIFICANT CHANGE UP (ref 96–108)
CO2 SERPL-SCNC: 23 MMOL/L — SIGNIFICANT CHANGE UP (ref 22–31)
CREAT SERPL-MCNC: 0.63 MG/DL — SIGNIFICANT CHANGE UP (ref 0.5–1.3)
EGFR: 111 ML/MIN/1.73M2 — SIGNIFICANT CHANGE UP
EOSINOPHIL # BLD AUTO: 0.12 K/UL — SIGNIFICANT CHANGE UP (ref 0–0.5)
EOSINOPHIL NFR BLD AUTO: 1.6 % — SIGNIFICANT CHANGE UP (ref 0–6)
GLUCOSE BLDC GLUCOMTR-MCNC: 141 MG/DL — HIGH (ref 70–99)
GLUCOSE BLDC GLUCOMTR-MCNC: 143 MG/DL — HIGH (ref 70–99)
GLUCOSE BLDC GLUCOMTR-MCNC: 146 MG/DL — HIGH (ref 70–99)
GLUCOSE BLDC GLUCOMTR-MCNC: 176 MG/DL — HIGH (ref 70–99)
GLUCOSE SERPL-MCNC: 143 MG/DL — HIGH (ref 70–99)
HCT VFR BLD CALC: 38.3 % — LOW (ref 39–50)
HCV AB S/CO SERPL IA: 0.17 S/CO — SIGNIFICANT CHANGE UP (ref 0–0.99)
HCV AB SERPL-IMP: SIGNIFICANT CHANGE UP
HGB BLD-MCNC: 12.5 G/DL — LOW (ref 13–17)
IMM GRANULOCYTES NFR BLD AUTO: 0.5 % — SIGNIFICANT CHANGE UP (ref 0–0.9)
LYMPHOCYTES # BLD AUTO: 3.33 K/UL — HIGH (ref 1–3.3)
LYMPHOCYTES # BLD AUTO: 45.2 % — HIGH (ref 13–44)
MCHC RBC-ENTMCNC: 28.2 PG — SIGNIFICANT CHANGE UP (ref 27–34)
MCHC RBC-ENTMCNC: 32.6 GM/DL — SIGNIFICANT CHANGE UP (ref 32–36)
MCV RBC AUTO: 86.5 FL — SIGNIFICANT CHANGE UP (ref 80–100)
MONOCYTES # BLD AUTO: 0.75 K/UL — SIGNIFICANT CHANGE UP (ref 0–0.9)
MONOCYTES NFR BLD AUTO: 10.2 % — SIGNIFICANT CHANGE UP (ref 2–14)
NEUTROPHILS # BLD AUTO: 3.09 K/UL — SIGNIFICANT CHANGE UP (ref 1.8–7.4)
NEUTROPHILS NFR BLD AUTO: 42.1 % — LOW (ref 43–77)
NRBC # BLD: 0 /100 WBCS — SIGNIFICANT CHANGE UP (ref 0–0)
PLATELET # BLD AUTO: 242 K/UL — SIGNIFICANT CHANGE UP (ref 150–400)
POTASSIUM SERPL-MCNC: 4.2 MMOL/L — SIGNIFICANT CHANGE UP (ref 3.5–5.3)
POTASSIUM SERPL-SCNC: 4.2 MMOL/L — SIGNIFICANT CHANGE UP (ref 3.5–5.3)
PROT SERPL-MCNC: 7.6 G/DL — SIGNIFICANT CHANGE UP (ref 6–8.3)
RBC # BLD: 4.43 M/UL — SIGNIFICANT CHANGE UP (ref 4.2–5.8)
RBC # FLD: 14.8 % — HIGH (ref 10.3–14.5)
SODIUM SERPL-SCNC: 138 MMOL/L — SIGNIFICANT CHANGE UP (ref 135–145)
T3 SERPL-MCNC: 90 NG/DL — SIGNIFICANT CHANGE UP (ref 80–200)
T4 AB SER-ACNC: 7 UG/DL — SIGNIFICANT CHANGE UP (ref 4.6–12)
TSH SERPL-MCNC: 6.37 UIU/ML — HIGH (ref 0.27–4.2)
WBC # BLD: 7.36 K/UL — SIGNIFICANT CHANGE UP (ref 3.8–10.5)
WBC # FLD AUTO: 7.36 K/UL — SIGNIFICANT CHANGE UP (ref 3.8–10.5)

## 2023-01-13 PROCEDURE — 99231 SBSQ HOSP IP/OBS SF/LOW 25: CPT | Mod: FS

## 2023-01-13 RX ORDER — ZOLPIDEM TARTRATE 10 MG/1
5 TABLET ORAL AT BEDTIME
Refills: 0 | Status: DISCONTINUED | OUTPATIENT
Start: 2023-01-13 | End: 2023-01-17

## 2023-01-13 RX ORDER — INSULIN GLARGINE 100 [IU]/ML
65 INJECTION, SOLUTION SUBCUTANEOUS AT BEDTIME
Refills: 0 | Status: DISCONTINUED | OUTPATIENT
Start: 2023-01-13 | End: 2023-01-14

## 2023-01-13 RX ADMIN — ESCITALOPRAM OXALATE 10 MILLIGRAM(S): 10 TABLET, FILM COATED ORAL at 21:11

## 2023-01-13 RX ADMIN — QUETIAPINE FUMARATE 400 MILLIGRAM(S): 200 TABLET, FILM COATED ORAL at 21:08

## 2023-01-13 RX ADMIN — AMLODIPINE BESYLATE 10 MILLIGRAM(S): 2.5 TABLET ORAL at 21:10

## 2023-01-13 RX ADMIN — INSULIN GLARGINE 65 UNIT(S): 100 INJECTION, SOLUTION SUBCUTANEOUS at 22:07

## 2023-01-13 RX ADMIN — Medication 5 UNIT(S): at 16:40

## 2023-01-13 RX ADMIN — Medication 25 MILLIGRAM(S): at 21:10

## 2023-01-13 RX ADMIN — Medication 81 MILLIGRAM(S): at 08:22

## 2023-01-13 RX ADMIN — ZOLPIDEM TARTRATE 5 MILLIGRAM(S): 10 TABLET ORAL at 22:07

## 2023-01-13 RX ADMIN — FAMOTIDINE 20 MILLIGRAM(S): 10 INJECTION INTRAVENOUS at 08:22

## 2023-01-13 RX ADMIN — Medication 1: at 11:36

## 2023-01-13 RX ADMIN — SODIUM CHLORIDE 3 MILLILITER(S): 9 INJECTION INTRAMUSCULAR; INTRAVENOUS; SUBCUTANEOUS at 21:35

## 2023-01-13 RX ADMIN — DIVALPROEX SODIUM 1500 MILLIGRAM(S): 500 TABLET, DELAYED RELEASE ORAL at 22:33

## 2023-01-13 RX ADMIN — Medication 5 UNIT(S): at 11:37

## 2023-01-13 RX ADMIN — ENOXAPARIN SODIUM 120 MILLIGRAM(S): 100 INJECTION SUBCUTANEOUS at 06:19

## 2023-01-13 RX ADMIN — SODIUM CHLORIDE 3 MILLILITER(S): 9 INJECTION INTRAMUSCULAR; INTRAVENOUS; SUBCUTANEOUS at 13:09

## 2023-01-13 RX ADMIN — Medication 2 MILLIGRAM(S): at 21:10

## 2023-01-13 RX ADMIN — Medication 25 MILLIGRAM(S): at 08:22

## 2023-01-13 RX ADMIN — HALOPERIDOL DECANOATE 20 MILLIGRAM(S): 100 INJECTION INTRAMUSCULAR at 21:09

## 2023-01-13 RX ADMIN — SODIUM CHLORIDE 3 MILLILITER(S): 9 INJECTION INTRAMUSCULAR; INTRAVENOUS; SUBCUTANEOUS at 05:54

## 2023-01-13 RX ADMIN — ENOXAPARIN SODIUM 120 MILLIGRAM(S): 100 INJECTION SUBCUTANEOUS at 17:41

## 2023-01-13 RX ADMIN — Medication 2 MILLIGRAM(S): at 08:22

## 2023-01-13 RX ADMIN — ATORVASTATIN CALCIUM 80 MILLIGRAM(S): 80 TABLET, FILM COATED ORAL at 21:11

## 2023-01-13 RX ADMIN — Medication 5 UNIT(S): at 08:06

## 2023-01-13 NOTE — PROGRESS NOTE ADULT - SUBJECTIVE AND OBJECTIVE BOX
Subjective: "I am ok "  Patient denies chest pain/SOB     TELEMETRY: NSR     VITAL SIGNS    Vital Signs Last 24 Hrs  T(C): 36.4 (23 @ 05:26), Max: 36.9 (23 @ 20:21)  T(F): 97.5 (23 @ 05:26), Max: 98.4 (23 @ 20:21)  HR: 76 (23 08:24) (75 - 77)  BP: 144/80 (23 @ 08:24) (131/77 - 144/80)  RR: 18 (23 08:24) (18 - 20)  SpO2: 93% (23 @ 08:24) (92% - 93%)             @ 07:01  -   @ 07:00  --------------------------------------------------------  IN: 1320 mL / OUT: 1750 mL / NET: -430 mL     07:  -   @ 12:07  --------------------------------------------------------  IN: 720 mL / OUT: 500 mL / NET: 220 mL       Daily     Daily Weight in k.3 (2023 08:06)  Admit Wt: Drug Dosing Weight  Height (cm): 172.7 (2023 05:45)  Weight (kg): 115.6 (2023 05:45)  BMI (kg/m2): 38.8 (2023 05:45)  BSA (m2): 2.27 (2023 05:45)    Bilirubin Total, Serum: 0.4 mg/dL ( @ 06:11)    CAPILLARY BLOOD GLUCOSE      POCT Blood Glucose.: 176 mg/dL (2023 11:13)  POCT Blood Glucose.: 143 mg/dL (2023 07:43)  POCT Blood Glucose.: 162 mg/dL (2023 21:40)  POCT Blood Glucose.: 188 mg/dL (2023 16:21)          PHYSICAL EXAM    Neurology: alert and oriented x 3, nonfocal, no gross deficits  CV : RRR +S1/S2  Sternal Wound :  CDI with dressing , Stable  Lungs: CTA BL. RR easy, unlabored   Abdomen: soft, nontender, nondistended, positive bowel sounds, bowel movement   Neg N/V/D   :  pt voiding without difficulty   Extremities:  Able to MISHRA; +2 BL LE edema areas of healed wounds on shins, neg calf tenderness.   PPP bilaterally      amLODIPine   Tablet 10 milliGRAM(s) Oral at bedtime  aspirin enteric coated 81 milliGRAM(s) Oral daily  atorvastatin 80 milliGRAM(s) Oral at bedtime  benztropine 2 milliGRAM(s) Oral every 12 hours  dextrose 5%. 1000 milliLiter(s) IV Continuous <Continuous>  dextrose 5%. 1000 milliLiter(s) IV Continuous <Continuous>  dextrose 50% Injectable 25 Gram(s) IV Push once  dextrose 50% Injectable 12.5 Gram(s) IV Push once  dextrose 50% Injectable 25 Gram(s) IV Push once  dextrose Oral Gel 15 Gram(s) Oral once PRN  divalproex ER 1500 milliGRAM(s) Oral at bedtime  enoxaparin Injectable 120 milliGRAM(s) SubCutaneous every 12 hours  escitalopram 10 milliGRAM(s) Oral at bedtime  famotidine    Tablet 20 milliGRAM(s) Oral daily  glucagon  Injectable 1 milliGRAM(s) IntraMuscular once  haloperidol     Tablet 20 milliGRAM(s) Oral at bedtime  insulin glargine Injectable (LANTUS) 60 Unit(s) SubCutaneous at bedtime  insulin lispro (ADMELOG) corrective regimen sliding scale   SubCutaneous three times a day before meals  insulin lispro (ADMELOG) corrective regimen sliding scale   SubCutaneous at bedtime  insulin lispro Injectable (ADMELOG) 5 Unit(s) SubCutaneous three times a day before meals  melatonin 3 milliGRAM(s) Oral at bedtime PRN  metoprolol tartrate 25 milliGRAM(s) Oral every 12 hours  QUEtiapine 400 milliGRAM(s) Oral at bedtime  sodium chloride 0.9% lock flush 3 milliLiter(s) IV Push every 8 hours      Patient name: LUIS A REZA  YOB: 1965   Age: 57 (M)   MR#: [id number]  Study Date: 2023  Location: [address] [place], [place] [id number]: [facility] Rios [facility]  Study quality: Technically good  Referring Physician: Chuck Hou MD  Blood Pressure: 128/66 mmHg  Height: 173 cm  Weight: 115 kg  BSA: 2.3 m2  ------------------------------------------------------------------------  PROCEDURE: Transthoracic echocardiogram with 2-D, M-Mode  and complete spectral and color flow Doppler.  INDICATION: Encounter for preprocedural cardiovascular  examination (Z01.810)  ------------------------------------------------------------------------  Dimensions:    Normal Values:  LA:     3.8    2.0 - 4.0 cm  Ao:     3.3    2.0 - 3.8 cm  SEPTUM: 1.1    0.6 - 1.2 cm  PWT:    1.2    0.6 - 1.1 cm  LVIDd:  4.2    3.0 - 5.6 cm  LVIDs:  2.2    1.8 - 4.0 cm  Derived variables:  LVMI: 74 g/m2  RWT: 0.57  Fractional short: 48 %  EF (Moreno Rule): 74 %  ------------------------------------------------------------------------  Observations:  Mitral Valve: Normal mitral valve.  Aortic Valve/Aorta: Calcified trileaflet aortic valve with  normal opening.  Aortic Root: 3.3 cm.  Left Atrium: Normal left atrium.  Left Ventricle: Normal left ventricular systolic function.  No segmental wall motion abnormalities. Increased relative  wall thickness with normal left ventricular mass index,  consistent with concentric left ventricular remodeling.  Normal diastolic function.  Right Heart: Normal right atrium. Normal right ventricular  size and function. Tricuspid valve not well visualized.  Pulmonic valve not well visualized.  Pericardium/Pleura: Normal pericardium with no pericardial  effusion.  Hemodynamic: Estimated right atrial pressure is 8 mm Hg.  ------------------------------------------------------------------------  Conclusions:  1. Normal left ventricular systolic function. No segmental  wall motion abnormalities.  2. Normal right ventricular size and function.  *** No previous Echo exam.    ACC: [id number] EXAM:  CAROTID DUPLEX BILATERAL                          PROCEDURE DATE:  2023          INTERPRETATION:  CLINICAL INFORMATION: 57-year-old with coronary artery   disease, diabetes, hypertension and prior MI. Preoperative evaluation for   CABG. Assess for carotid stenosis.    COMPARISON: None available.    TECHNIQUE: Grayscale, color and spectral Doppler examination of both   carotid arteries was performed.    FINDINGS:    There is intimal thickening and calcified plaque identified in both   carotid arteries extending through the bifurcations.    Peak systolic velocities are as follows:    RIGHT:  PROX CCA = 57 cm/s  DIST CCA = 56 cm/s  PROX ICA = 79 cm/s  DIST ICA = 66 cm/s  ECA = 80 cm/s    LEFT:  PROX CCA = 87 cm/s  DIST CCA = 57 cm/s  PROX ICA = 65 cm/s  DIST ICA = 51 cm/s  ECA = 72 cm/s    Antegrade flow is noted within both vertebral arteries.    IMPRESSION: Bilateral calcified plaque. No significant hemodynamic   stenosis of either carotid artery.    Measurement of carotid stenosis is based on velocity parameters that   correlate the residual internal carotid diameter with that of the more   distal vessel in accordance with a method such as the [place] American   Symptomatic Carotid Endarterectomy Trial (NASCET).    --- End of Report ---

## 2023-01-13 NOTE — PROGRESS NOTE ADULT - ASSESSMENT
Patient is a 57M /w Penicillin allergy, PMH  HTN, HLD, DM2, bipolar disorder, SHIN s/p uvula resection not on CPAP,  known CAD with MI with RCA/OM stents 2013 who presented to his Cardiologist complaining of progressive SOB/LEMOS over the last 4 weeks.  Denies having typical chest pain type symptoms.  Had abnormal stress test and underwent elective cath yesterday on 1/11/23 which revealed EF 65, pLAD 90, mLAD 20, dLAD 80, OM 99, mRCA 95 via RRA access.  Now transferred to [facility] for CABG eval /w Dr. Barrientos.  Currently patient denies chest pain/sob.  No headache/dizziness.  No abdominal pain/nausea/vomiting.  No bowel/urinary symptoms.  No lower extremity pain/numbness/tingling.  No tooth pain.  Patient states he typically ambulates independently without use of assistive devices and lives with his wife in [place].      1/12/2023. Patient transferred to [facility] for CABG evaluation /w Dr. Barrientos to review cath images/TTE.    1/13 VSS overnight.  TTE done showing normal LV/RV function. Carotids without significant stenosis.  UA negative.  Started on weight based Lovenox 120mg BID

## 2023-01-13 NOTE — PROGRESS NOTE ADULT - SUBJECTIVE AND OBJECTIVE BOX
Date of Service  : 23     INTERVAL HPI/OVERNIGHT EVENTS: Seen and examined with daughter in room.   Vital Signs Last 24 Hrs  T(C): 36.3 (2023 12:19), Max: 36.9 (2023 20:21)  T(F): 97.3 (2023 12:19), Max: 98.4 (2023 20:21)  HR: 77 (2023 13:04) (73 - 77)  BP: 132/74 (2023 13:04) (131/77 - 145/74)  BP(mean): --  RR: 18 (2023 12:19) (18 - 20)  SpO2: 95% (2023 13:04) (92% - 95%)    Parameters below as of 2023 13:04  Patient On (Oxygen Delivery Method): room air      I&O's Summary    2023 07:01  -  2023 07:00  --------------------------------------------------------  IN: 1320 mL / OUT: 1750 mL / NET: -430 mL    2023 07:01  -  2023 17:04  --------------------------------------------------------  IN: 720 mL / OUT: 800 mL / NET: -80 mL      MEDICATIONS  (STANDING):  amLODIPine   Tablet 10 milliGRAM(s) Oral at bedtime  aspirin enteric coated 81 milliGRAM(s) Oral daily  atorvastatin 80 milliGRAM(s) Oral at bedtime  benztropine 2 milliGRAM(s) Oral every 12 hours  dextrose 5%. 1000 milliLiter(s) (50 mL/Hr) IV Continuous <Continuous>  dextrose 5%. 1000 milliLiter(s) (100 mL/Hr) IV Continuous <Continuous>  dextrose 50% Injectable 25 Gram(s) IV Push once  dextrose 50% Injectable 12.5 Gram(s) IV Push once  dextrose 50% Injectable 25 Gram(s) IV Push once  divalproex ER 1500 milliGRAM(s) Oral at bedtime  enoxaparin Injectable 120 milliGRAM(s) SubCutaneous every 12 hours  escitalopram 10 milliGRAM(s) Oral at bedtime  famotidine    Tablet 20 milliGRAM(s) Oral daily  glucagon  Injectable 1 milliGRAM(s) IntraMuscular once  haloperidol     Tablet 20 milliGRAM(s) Oral at bedtime  insulin glargine Injectable (LANTUS) 65 Unit(s) SubCutaneous at bedtime  insulin lispro (ADMELOG) corrective regimen sliding scale   SubCutaneous three times a day before meals  insulin lispro (ADMELOG) corrective regimen sliding scale   SubCutaneous at bedtime  insulin lispro Injectable (ADMELOG) 5 Unit(s) SubCutaneous three times a day before meals  metoprolol tartrate 25 milliGRAM(s) Oral every 12 hours  QUEtiapine 400 milliGRAM(s) Oral at bedtime  sodium chloride 0.9% lock flush 3 milliLiter(s) IV Push every 8 hours    MEDICATIONS  (PRN):  dextrose Oral Gel 15 Gram(s) Oral once PRN Blood Glucose LESS THAN 70 milliGRAM(s)/deciliter  melatonin 3 milliGRAM(s) Oral at bedtime PRN Sleep  zolpidem 5 milliGRAM(s) Oral at bedtime PRN Insomnia    LABS:                        12.5   7.36  )-----------( 242      ( 2023 06:12 )             38.3     01-13    138  |  101  |  14  ----------------------------<  143<H>  4.2   |  23  |  0.63    Ca    9.7      2023 06:11    TPro  7.6  /  Alb  4.0  /  TBili  0.4  /  DBili  x   /  AST  23  /  ALT  20  /  AlkPhos  89  01-13    PT/INR - ( 2023 07:12 )   PT: 12.2 sec;   INR: 1.06 ratio         PTT - ( 2023 07:12 )  PTT:29.1 sec  Urinalysis Basic - ( 2023 07:12 )    Color: Light Yellow / Appearance: Clear / S.027 / pH: x  Gluc: x / Ketone: Trace  / Bili: Negative / Urobili: Negative   Blood: x / Protein: Negative / Nitrite: Negative   Leuk Esterase: Negative / RBC: x / WBC x   Sq Epi: x / Non Sq Epi: x / Bacteria: x      CAPILLARY BLOOD GLUCOSE      POCT Blood Glucose.: 146 mg/dL (2023 16:18)  POCT Blood Glucose.: 176 mg/dL (2023 11:13)  POCT Blood Glucose.: 143 mg/dL (2023 07:43)  POCT Blood Glucose.: 162 mg/dL (2023 21:40)        Urinalysis Basic - ( 2023 07:12 )    Color: Light Yellow / Appearance: Clear / S.027 / pH: x  Gluc: x / Ketone: Trace  / Bili: Negative / Urobili: Negative   Blood: x / Protein: Negative / Nitrite: Negative   Leuk Esterase: Negative / RBC: x / WBC x   Sq Epi: x / Non Sq Epi: x / Bacteria: x      REVIEW OF SYSTEMS:  CONSTITUTIONAL: No fever, weight loss, or fatigue  EYES: No eye pain, visual disturbances, or discharge  ENMT:  No difficulty hearing, tinnitus, vertigo; No sinus or throat pain  NECK: No pain or stiffness  RESPIRATORY: No cough, wheezing, chills or hemoptysis; No shortness of breath  CARDIOVASCULAR: No chest pain, palpitations, dizziness, or leg swelling  GASTROINTESTINAL: No abdominal or epigastric pain. No nausea, vomiting, or hematemesis; No diarrhea or constipation. No melena or hematochezia.  GENITOURINARY: No dysuria, frequency, hematuria, or incontinence  NEUROLOGICAL: No headaches, memory loss, loss of strength, numbness, or tremors      Consultant(s) Notes Reviewed:  [x ] YES  [ ] NO    PHYSICAL EXAM:  GENERAL: NAD, well-groomed, well-developed,not in any distress ,  HEAD:  Atraumatic, Normocephalic  EYES: EOMI, PERRLA, conjunctiva and sclera clear  ENMT: No tonsillar erythema, exudates, or enlargement; Moist mucous membranes, Good dentition, No lesions  NECK: Supple, No JVD, Normal thyroid  NERVOUS SYSTEM:  Alert & Oriented X3, No focal deficit   CHEST/LUNG: Good air entry bilateral with no  rales, rhonchi, wheezing, or rubs  HEART: Regular rate and rhythm; No murmurs, rubs, or gallops  ABDOMEN: Soft, Nontender, Nondistended; Bowel sounds present  EXTREMITIES:  2+ Peripheral Pulses, No clubbing, cyanosis, or edema    Care Discussed with Consultants/Other Providers [ x] YES  [ ] NO

## 2023-01-13 NOTE — PROGRESS NOTE ADULT - ASSESSMENT
57M /w Penicillin allergy, PMH  HTN, HLD, DM2, bipolar disorder, SHIN s/p uvula resection not on CPAP,  known CAD with MI with RCA/OM stents 2013 who presented to his Cardiologist complaining of progressive SOB/LEMOS over the last 4 weeks.  Denies having typical chest pain type symptoms.  Had abnormal stress test and underwent elective cath yesterday on 1/11/23 which revealed EF 65, pLAD 90, mLAD 20, dLAD 80, OM 99, mRCA 95 via RRA access.  Currently patient denies chest pain/sob.  No headache/dizziness.  No abdominal pain/nausea/vomiting.  No bowel/urinary symptoms.  No lower extremity pain/numbness/tingling.  No tooth pain.  Patient states he typically ambulates independently without use of assistive devices and lives with his wife in [place].       Problem/Recommendation - 1:  ·  Problem: CAD, multiple vessel.   ·  Recommendation: CTS help appreciated . Planning CABG on Tuesday .   ASA ,Lovenox , Statin and BB.     Problem/Recommendation - 2:  ·  Problem: Diabetes.   ·  Recommendation: Uncontrolled as HgbA1C high so will benefit from endo consult.   Lantus and SSI for now.  Sugars under good control.     Problem/Recommendation - 3:  ·  Problem: Hypertension.   ·  Recommendation: BP readings fine.  BP meds with hold parameters.     Problem/Recommendation - 4:  ·  Problem: Hyperlipidemia.   ·  Recommendation: Statin.     Problem/Recommendation - 5:  ·  Problem: Bipolar disorder.   ·  Recommendation: Home meds.

## 2023-01-13 NOTE — PROGRESS NOTE ADULT - PROBLEM SELECTOR PLAN 1
Patient had cath 1/11 revealing severe TVD EF 65, pLAD 90, mLAD 20, dLAD 80, OM 99, mRCA 95 via RRA access    TX to [facility] for CABG eval with Dr. Hou    Pre-op eval in [facility]  Will obtain pre-op CABG lab work/imaging/diagnostics  Hold P2Y12 inhibitors (on Effient as outpatient per med rec)  most recent P2Y12 on 1/12 was 99 will need repeat before OR   Continue betablockade with Lopressor 25mg Q12 titrate as BP/HR allows  TTE/Carotid Dopplers/CXR/EKG/UA performed  MRSA PCR negative

## 2023-01-14 LAB
ALBUMIN SERPL ELPH-MCNC: 3.7 G/DL — SIGNIFICANT CHANGE UP (ref 3.3–5)
ALP SERPL-CCNC: 89 U/L — SIGNIFICANT CHANGE UP (ref 40–120)
ALT FLD-CCNC: 28 U/L — SIGNIFICANT CHANGE UP (ref 10–45)
ANION GAP SERPL CALC-SCNC: 16 MMOL/L — SIGNIFICANT CHANGE UP (ref 5–17)
AST SERPL-CCNC: 30 U/L — SIGNIFICANT CHANGE UP (ref 10–40)
BILIRUB SERPL-MCNC: 0.2 MG/DL — SIGNIFICANT CHANGE UP (ref 0.2–1.2)
BUN SERPL-MCNC: 14 MG/DL — SIGNIFICANT CHANGE UP (ref 7–23)
CALCIUM SERPL-MCNC: 9.4 MG/DL — SIGNIFICANT CHANGE UP (ref 8.4–10.5)
CHLORIDE SERPL-SCNC: 103 MMOL/L — SIGNIFICANT CHANGE UP (ref 96–108)
CO2 SERPL-SCNC: 20 MMOL/L — LOW (ref 22–31)
CREAT SERPL-MCNC: 0.66 MG/DL — SIGNIFICANT CHANGE UP (ref 0.5–1.3)
EGFR: 109 ML/MIN/1.73M2 — SIGNIFICANT CHANGE UP
GLUCOSE BLDC GLUCOMTR-MCNC: 117 MG/DL — HIGH (ref 70–99)
GLUCOSE BLDC GLUCOMTR-MCNC: 135 MG/DL — HIGH (ref 70–99)
GLUCOSE BLDC GLUCOMTR-MCNC: 142 MG/DL — HIGH (ref 70–99)
GLUCOSE BLDC GLUCOMTR-MCNC: 148 MG/DL — HIGH (ref 70–99)
GLUCOSE SERPL-MCNC: 56 MG/DL — LOW (ref 70–99)
HCT VFR BLD CALC: 36.6 % — LOW (ref 39–50)
HGB BLD-MCNC: 12 G/DL — LOW (ref 13–17)
MAGNESIUM SERPL-MCNC: 2 MG/DL — SIGNIFICANT CHANGE UP (ref 1.6–2.6)
MCHC RBC-ENTMCNC: 28 PG — SIGNIFICANT CHANGE UP (ref 27–34)
MCHC RBC-ENTMCNC: 32.8 GM/DL — SIGNIFICANT CHANGE UP (ref 32–36)
MCV RBC AUTO: 85.3 FL — SIGNIFICANT CHANGE UP (ref 80–100)
NRBC # BLD: 0 /100 WBCS — SIGNIFICANT CHANGE UP (ref 0–0)
PHOSPHATE SERPL-MCNC: 3.7 MG/DL — SIGNIFICANT CHANGE UP (ref 2.5–4.5)
PLATELET # BLD AUTO: 238 K/UL — SIGNIFICANT CHANGE UP (ref 150–400)
POTASSIUM SERPL-MCNC: 3.8 MMOL/L — SIGNIFICANT CHANGE UP (ref 3.5–5.3)
POTASSIUM SERPL-SCNC: 3.8 MMOL/L — SIGNIFICANT CHANGE UP (ref 3.5–5.3)
PROT SERPL-MCNC: 7.4 G/DL — SIGNIFICANT CHANGE UP (ref 6–8.3)
RBC # BLD: 4.29 M/UL — SIGNIFICANT CHANGE UP (ref 4.2–5.8)
RBC # FLD: 14.6 % — HIGH (ref 10.3–14.5)
SODIUM SERPL-SCNC: 139 MMOL/L — SIGNIFICANT CHANGE UP (ref 135–145)
WBC # BLD: 9.61 K/UL — SIGNIFICANT CHANGE UP (ref 3.8–10.5)
WBC # FLD AUTO: 9.61 K/UL — SIGNIFICANT CHANGE UP (ref 3.8–10.5)

## 2023-01-14 PROCEDURE — 99231 SBSQ HOSP IP/OBS SF/LOW 25: CPT | Mod: FS

## 2023-01-14 RX ORDER — SENNA PLUS 8.6 MG/1
2 TABLET ORAL AT BEDTIME
Refills: 0 | Status: DISCONTINUED | OUTPATIENT
Start: 2023-01-14 | End: 2023-01-17

## 2023-01-14 RX ORDER — INSULIN LISPRO 100/ML
8 VIAL (ML) SUBCUTANEOUS
Refills: 0 | Status: DISCONTINUED | OUTPATIENT
Start: 2023-01-14 | End: 2023-01-17

## 2023-01-14 RX ORDER — INSULIN GLARGINE 100 [IU]/ML
50 INJECTION, SOLUTION SUBCUTANEOUS AT BEDTIME
Refills: 0 | Status: DISCONTINUED | OUTPATIENT
Start: 2023-01-14 | End: 2023-01-15

## 2023-01-14 RX ADMIN — Medication 5 UNIT(S): at 07:49

## 2023-01-14 RX ADMIN — Medication 3 MILLIGRAM(S): at 23:34

## 2023-01-14 RX ADMIN — Medication 25 MILLIGRAM(S): at 07:49

## 2023-01-14 RX ADMIN — ENOXAPARIN SODIUM 120 MILLIGRAM(S): 100 INJECTION SUBCUTANEOUS at 17:11

## 2023-01-14 RX ADMIN — FAMOTIDINE 20 MILLIGRAM(S): 10 INJECTION INTRAVENOUS at 11:37

## 2023-01-14 RX ADMIN — ATORVASTATIN CALCIUM 80 MILLIGRAM(S): 80 TABLET, FILM COATED ORAL at 21:54

## 2023-01-14 RX ADMIN — HALOPERIDOL DECANOATE 20 MILLIGRAM(S): 100 INJECTION INTRAMUSCULAR at 21:54

## 2023-01-14 RX ADMIN — Medication 2 MILLIGRAM(S): at 19:40

## 2023-01-14 RX ADMIN — SODIUM CHLORIDE 3 MILLILITER(S): 9 INJECTION INTRAMUSCULAR; INTRAVENOUS; SUBCUTANEOUS at 13:33

## 2023-01-14 RX ADMIN — ENOXAPARIN SODIUM 120 MILLIGRAM(S): 100 INJECTION SUBCUTANEOUS at 05:20

## 2023-01-14 RX ADMIN — INSULIN GLARGINE 50 UNIT(S): 100 INJECTION, SOLUTION SUBCUTANEOUS at 21:54

## 2023-01-14 RX ADMIN — ESCITALOPRAM OXALATE 10 MILLIGRAM(S): 10 TABLET, FILM COATED ORAL at 21:12

## 2023-01-14 RX ADMIN — QUETIAPINE FUMARATE 400 MILLIGRAM(S): 200 TABLET, FILM COATED ORAL at 21:13

## 2023-01-14 RX ADMIN — Medication 2 MILLIGRAM(S): at 07:49

## 2023-01-14 RX ADMIN — AMLODIPINE BESYLATE 10 MILLIGRAM(S): 2.5 TABLET ORAL at 21:14

## 2023-01-14 RX ADMIN — Medication 25 MILLIGRAM(S): at 19:40

## 2023-01-14 RX ADMIN — Medication 8 UNIT(S): at 17:11

## 2023-01-14 RX ADMIN — SODIUM CHLORIDE 3 MILLILITER(S): 9 INJECTION INTRAMUSCULAR; INTRAVENOUS; SUBCUTANEOUS at 21:37

## 2023-01-14 RX ADMIN — Medication 81 MILLIGRAM(S): at 11:37

## 2023-01-14 RX ADMIN — ZOLPIDEM TARTRATE 5 MILLIGRAM(S): 10 TABLET ORAL at 21:14

## 2023-01-14 RX ADMIN — DIVALPROEX SODIUM 1500 MILLIGRAM(S): 500 TABLET, DELAYED RELEASE ORAL at 21:12

## 2023-01-14 RX ADMIN — Medication 5 UNIT(S): at 11:37

## 2023-01-14 RX ADMIN — SENNA PLUS 2 TABLET(S): 8.6 TABLET ORAL at 21:11

## 2023-01-14 RX ADMIN — SODIUM CHLORIDE 3 MILLILITER(S): 9 INJECTION INTRAMUSCULAR; INTRAVENOUS; SUBCUTANEOUS at 05:03

## 2023-01-14 NOTE — PROGRESS NOTE ADULT - ASSESSMENT
Patient is a 57M /w Penicillin allergy, PMH  HTN, HLD, DM2, bipolar disorder, SHIN s/p uvula resection not on CPAP,  known CAD with MI with RCA/OM stents 2013 who presented to his Cardiologist complaining of progressive SOB/LEMOS over the last 4 weeks.  Denies having typical chest pain type symptoms.  Had abnormal stress test and underwent elective cath yesterday on 1/11/23 which revealed EF 65, pLAD 90, mLAD 20, dLAD 80, OM 99, mRCA 95 via RRA access.  Now transferred to [facility] for CABG eval /w Dr. Barrientos.  Currently patient denies chest pain/sob.  No headache/dizziness.  No abdominal pain/nausea/vomiting.  No bowel/urinary symptoms.  No lower extremity pain/numbness/tingling.  No tooth pain.  Patient states he typically ambulates independently without use of assistive devices and lives with his wife in [place].      1/12/2023. Patient transferred to [facility] for CABG evaluation /w Dr. Barrientos to review cath images/TTE.    1/13 VSS overnight.  TTE done showing normal LV/RV function. Carotids without significant stenosis.  UA negative.  Started on weight based Lovenox 120mg BID   1/14 VSS TSH elevated 6.69 Dr Sequeira endocrine consulted CP free P2y12 99 repeat in am

## 2023-01-14 NOTE — PROGRESS NOTE ADULT - SUBJECTIVE AND OBJECTIVE BOX
Date of Service  : 01-14-23     INTERVAL HPI/OVERNIGHT EVENTS: Seen and examined with wife in room . No new concerns.   Vital Signs Last 24 Hrs  T(C): 36.6 (14 Jan 2023 12:06), Max: 36.8 (14 Jan 2023 04:15)  T(F): 97.9 (14 Jan 2023 12:06), Max: 98.2 (14 Jan 2023 04:15)  HR: 76 (14 Jan 2023 12:06) (72 - 93)  BP: 131/74 (14 Jan 2023 12:06) (131/74 - 137/79)  BP(mean): 101 (14 Jan 2023 07:53) (95 - 101)  RR: 18 (14 Jan 2023 12:06) (18 - 18)  SpO2: 94% (14 Jan 2023 12:06) (94% - 95%)    Parameters below as of 14 Jan 2023 12:06  Patient On (Oxygen Delivery Method): room air      I&O's Summary    13 Jan 2023 07:01  -  14 Jan 2023 07:00  --------------------------------------------------------  IN: 920 mL / OUT: 1600 mL / NET: -680 mL    14 Jan 2023 07:01  -  14 Jan 2023 18:57  --------------------------------------------------------  IN: 720 mL / OUT: 1450 mL / NET: -730 mL      MEDICATIONS  (STANDING):  amLODIPine   Tablet 10 milliGRAM(s) Oral at bedtime  aspirin enteric coated 81 milliGRAM(s) Oral daily  atorvastatin 80 milliGRAM(s) Oral at bedtime  benztropine 2 milliGRAM(s) Oral every 12 hours  dextrose 5%. 1000 milliLiter(s) (50 mL/Hr) IV Continuous <Continuous>  dextrose 5%. 1000 milliLiter(s) (100 mL/Hr) IV Continuous <Continuous>  dextrose 50% Injectable 25 Gram(s) IV Push once  dextrose 50% Injectable 12.5 Gram(s) IV Push once  dextrose 50% Injectable 25 Gram(s) IV Push once  divalproex ER 1500 milliGRAM(s) Oral at bedtime  enoxaparin Injectable 120 milliGRAM(s) SubCutaneous every 12 hours  escitalopram 10 milliGRAM(s) Oral at bedtime  famotidine    Tablet 20 milliGRAM(s) Oral daily  glucagon  Injectable 1 milliGRAM(s) IntraMuscular once  haloperidol     Tablet 20 milliGRAM(s) Oral at bedtime  insulin glargine Injectable (LANTUS) 50 Unit(s) SubCutaneous at bedtime  insulin lispro (ADMELOG) corrective regimen sliding scale   SubCutaneous three times a day before meals  insulin lispro (ADMELOG) corrective regimen sliding scale   SubCutaneous at bedtime  insulin lispro Injectable (ADMELOG) 8 Unit(s) SubCutaneous three times a day before meals  metoprolol tartrate 25 milliGRAM(s) Oral every 12 hours  QUEtiapine 400 milliGRAM(s) Oral at bedtime  sodium chloride 0.9% lock flush 3 milliLiter(s) IV Push every 8 hours    MEDICATIONS  (PRN):  dextrose Oral Gel 15 Gram(s) Oral once PRN Blood Glucose LESS THAN 70 milliGRAM(s)/deciliter  melatonin 3 milliGRAM(s) Oral at bedtime PRN Sleep  zolpidem 5 milliGRAM(s) Oral at bedtime PRN Insomnia    LABS:                        12.0   9.61  )-----------( 238      ( 14 Jan 2023 06:01 )             36.6     01-14    139  |  103  |  14  ----------------------------<  56<L>  3.8   |  20<L>  |  0.66    Ca    9.4      14 Jan 2023 06:01  Phos  3.7     01-14  Mg     2.0     01-14    TPro  7.4  /  Alb  3.7  /  TBili  0.2  /  DBili  x   /  AST  30  /  ALT  28  /  AlkPhos  89  01-14        CAPILLARY BLOOD GLUCOSE      POCT Blood Glucose.: 117 mg/dL (14 Jan 2023 16:29)  POCT Blood Glucose.: 142 mg/dL (14 Jan 2023 11:06)  POCT Blood Glucose.: 135 mg/dL (14 Jan 2023 07:35)  POCT Blood Glucose.: 141 mg/dL (13 Jan 2023 21:35)          REVIEW OF SYSTEMS:  CONSTITUTIONAL: No fever, weight loss, or fatigue  EYES: No eye pain, visual disturbances, or discharge  ENMT:  No difficulty hearing, tinnitus, vertigo; No sinus or throat pain  NECK: No pain or stiffness  RESPIRATORY: No cough, wheezing, chills or hemoptysis; No shortness of breath  CARDIOVASCULAR: No chest pain, palpitations, dizziness, or leg swelling  GASTROINTESTINAL: No abdominal or epigastric pain. No nausea, vomiting, or hematemesis; No diarrhea or constipation. No melena or hematochezia.  GENITOURINARY: No dysuria, frequency, hematuria, or incontinence  NEUROLOGICAL: No headaches, memory loss, loss of strength, numbness, or tremors      Consultant(s) Notes Reviewed:  [x ] YES  [ ] NO    PHYSICAL EXAM:  GENERAL: NAD, well-groomed, well-developed, not in any distress ,  HEAD:  Atraumatic, Normocephalic  EYES: EOMI, PERRLA, conjunctiva and sclera clear  ENMT: No tonsillar erythema, exudates, or enlargement; Moist mucous membranes, Good dentition, No lesions  NECK: Supple, No JVD, Normal thyroid  NERVOUS SYSTEM:  Alert & Oriented X3, No focal deficit   CHEST/LUNG: Good air entry bilateral with no  rales, rhonchi, wheezing, or rubs  HEART: Regular rate and rhythm; No murmurs, rubs, or gallops  ABDOMEN: Soft, Nontender, Nondistended; Bowel sounds present  EXTREMITIES:  2+ Peripheral Pulses, No clubbing, cyanosis, or edema  SKIN: No rashes or lesions    Care Discussed with Consultants/Other Providers [ x] YES  [ ] NO

## 2023-01-14 NOTE — CONSULT NOTE ADULT - ASSESSMENT
57M /w Penicillin allergy, PMH  HTN, HLD, DM2, bipolar disorder, SHIN s/p uvula resection not on CPAP,  known CAD with MI with RCA/OM stents 2013 who presented to his Cardiologist complaining of progressive SOB/LEMOS over the last 4 weeks.  Denies having typical chest pain type symptoms.  Had abnormal stress test and underwent elective cath yesterday on 1/11/23 which revealed EF 65, pLAD 90, mLAD 20, dLAD 80, OM 99, mRCA 95 via RRA access.  Currently patient denies chest pain/sob.  No headache/dizziness.  No abdominal pain/nausea/vomiting.  No bowel/urinary symptoms.  No lower extremity pain/numbness/tingling.  No tooth pain.  Patient states he typically ambulates independently without use of assistive devices and lives with his wife in [place].  
  Assessment  DMT2: 57y Male with DM T2 with hyperglycemia admitted with chest pain  A1C is 8.1%, patient was on insulin at home, now on basal bolus and insulin coverage, blood sugars trending down, no hypoglycemic episode,  eating meals, compliant with low carb diet.  Patient is high risk with high level decision making due to uncontrolled diabetes, has increased risk for complications. Tight sugar control is not recommended for this patient.  CAD: laning CABG, on medications, monitored, asymptomatic.  HTN: On antihypertensive medications, monitored, asymptomatic.                Guerrero Sequeira MD  Cell:  [phone]  Office: [phone]

## 2023-01-14 NOTE — PROGRESS NOTE ADULT - PROBLEM SELECTOR PLAN 1
Patient had cath 1/11 revealing severe TVD EF 65, pLAD 90, mLAD 20, dLAD 80, OM 99, mRCA 95 via RRA access    TX to [facility] for CABG eval with Dr. Hou    Pre-op eval in [facility]  Will obtain pre-op CABG lab work/imaging/diagnostics  Hold P2Y12 inhibitors (on Effient as outpatient per med rec)  most recent P2Y12 on 1/12 was 99 will need repeat before OR   Continue beta blockade with Lopressor 25mg Q12 titrate as BP/HR allows  TTE/Carotid Dopplers/CXR/EKG/UA performed  MRSA PCR negative

## 2023-01-14 NOTE — PROGRESS NOTE ADULT - SUBJECTIVE AND OBJECTIVE BOX
Subjective " hello "    VITAL SIGNS    Telemetry:  NSR 70s    Vital Signs Last 24 Hrs  T(C): 36.8 (23 @ 04:15), Max: 36.8 (23 @ 04:15)  T(F): 98.2 (23 @ 04:15), Max: 98.2 (23 @ 04:15)  HR: 84 (23 @ 07:53) (72 - 93)  BP: 135/84 (23 @ 07:53) (131/77 - 145/74)  RR: 18 (23 @ 04:15) (18 - 18)  SpO2: 95% (23 @ 04:15) (95% - 95%)            @ 07:01  -   @ 07:00  --------------------------------------------------------  IN: 920 mL / OUT: 1600 mL / NET: -680 mL     @ 07:01  -   @ 10:56  --------------------------------------------------------  IN: 360 mL / OUT: 700 mL / NET: -340 mL     Daily Weight in k.2 (2023 07:48)                            12.0   9.61  )-----------( 238      ( 2023 06:01 )             36.6           139  |  103  |  14  ----------------------------<  56<L>  3.8   |  20<L>  |  0.66    Ca    9.4      2023 06:01  Phos  3.7       Mg     2.0         TPro  7.4  /  Alb  3.7  /  TBili  0.2  /  DBili  x   /  AST  30  /  ALT  28  /  AlkPhos  89  01-14    MEDICATIONS  (STANDING):  amLODIPine   Tablet 10 milliGRAM(s) Oral at bedtime  aspirin enteric coated 81 milliGRAM(s) Oral daily  atorvastatin 80 milliGRAM(s) Oral at bedtime  benztropine 2 milliGRAM(s) Oral every 12 hours  enoxaparin Injectable 120 milliGRAM(s) SubCutaneous every 12 hours  escitalopram 10 milliGRAM(s) Oral at bedtime  famotidine    Tablet 20 milliGRAM(s) Oral daily  glucagon  Injectable 1 milliGRAM(s) IntraMuscular once  haloperidol     Tablet 20 milliGRAM(s) Oral at bedtime  insulin glargine Injectable (LANTUS) 65 Unit(s) SubCutaneous at bedtime  insulin lispro (ADMELOG) corrective regimen sliding scale   SubCutaneous three times a day before meals  insulin lispro (ADMELOG) corrective regimen sliding scale   SubCutaneous at bedtime  insulin lispro Injectable (ADMELOG) 5 Unit(s) SubCutaneous three times a day before meals  metoprolol tartrate 25 milliGRAM(s) Oral every 12 hours  QUEtiapine 400 milliGRAM(s) Oral at bedtime  sodium chloride 0.9% lock flush 3 milliLiter(s) IV Push every 8 hours    MEDICATIONS  (PRN):  dextrose Oral Gel 15 Gram(s) Oral once PRN Blood Glucose LESS THAN 70 milliGRAM(s)/deciliter  melatonin 3 milliGRAM(s) Oral at bedtime PRN Sleep  zolpidem 5 milliGRAM(s) Oral at bedtime PRN Insomnia        CAPILLARY BLOOD GLUCOSE      POCT Blood Glucose.: 135 mg/dL (2023 07:35)  POCT Blood Glucose.: 141 mg/dL (2023 21:35)  POCT Blood Glucose.: 146 mg/dL (2023 16:18)  POCT Blood Glucose.: 176 mg/dL (2023 11:13)                              PHYSICAL EXAM        Neurology: alert flat affect and oriented x 3, nonfocal, no gross deficits    CV :[id number]  Lungs: B/l cta on room air     Abdomen: soft, nontender, nondistended, positive bowel sounds, + flatus     : Voids              Extremities:  warm well perfused equal strength throughout   B/llle  + DP no calf tenderness                                         Physical Therapy Rec:   pending  Discussed with Cardiothoracic Team at AM rounds.

## 2023-01-14 NOTE — CONSULT NOTE ADULT - PROBLEM SELECTOR RECOMMENDATION 9
Will increase Lantus to 50 units at bed time.  Will increase Admelog to 8 units before each meal in addition to Admelog correction scale coverage.  Patient counseled for compliance with consistent low carb diet.  .
CTS help appreciated . Planning CABG . ASA ,Lovenox , Statin and BB

## 2023-01-14 NOTE — PROGRESS NOTE ADULT - ASSESSMENT
57M /w Penicillin allergy, PMH  HTN, HLD, DM2, bipolar disorder, SHIN s/p uvula resection not on CPAP,  known CAD with MI with RCA/OM stents 2013 who presented to his Cardiologist complaining of progressive SOB/LEMOS over the last 4 weeks.  Denies having typical chest pain type symptoms.  Had abnormal stress test and underwent elective cath yesterday on 1/11/23 which revealed EF 65, pLAD 90, mLAD 20, dLAD 80, OM 99, mRCA 95 via RRA access.  Currently patient denies chest pain/sob.  No headache/dizziness.  No abdominal pain/nausea/vomiting.  No bowel/urinary symptoms.  No lower extremity pain/numbness/tingling.  No tooth pain.  Patient states he typically ambulates independently without use of assistive devices and lives with his wife in [place].       Problem/Recommendation - 1:  ·  Problem: CAD, multiple vessel.   ·  Recommendation: CTS help appreciated .   Planning CABG on Tuesday .   ASA ,Lovenox , Statin and BB.     Problem/Recommendation - 2:  ·  Problem: Diabetes.   ·  Recommendation: Uncontrolled as HgbA1C high so endo helping.    Lantus and SSI for now.  Sugars under good control.     Problem/Recommendation - 3:  ·  Problem: Hypertension.   ·  Recommendation: BP readings fine.  BP meds with hold parameters.     Problem/Recommendation - 4:  ·  Problem: Hyperlipidemia.   ·  Recommendation: Statin.     Problem/Recommendation - 5:  ·  Problem: Bipolar disorder.   ·  Recommendation: Home meds.

## 2023-01-14 NOTE — CONSULT NOTE ADULT - SUBJECTIVE AND OBJECTIVE BOX
Cardiovascular Disease Initial Evaluation  Date of service: 01-13-23 @ 09:27    CHIEF COMPLAINT:  CAD    HISTORY OF PRESENT ILLNESS:  Mr. Brannon is a 57M /w Penicillin allergy, PMH  HTN, HLD, DM2, bipolar disorder, SHIN s/p uvula resection not on CPAP,  known CAD with MI with RCA/OM stents 2013 who presented to his Cardiologist complaining of progressive SOB/LEMOS over the last 4 weeks.  Denies having typical chest pain type symptoms.  Had abnormal stress test and underwent elective cath  on 1/11/23 which revealed EF 65, pLAD 90, mLAD 20, dLAD 80, OM 99, mRCA 95 via RRA access.  Now transferred to [facility] for CABG eval /w Dr. Barrientos.  Currently patient denies chest pain/sob.  No headache/dizziness.  No abdominal pain/nausea/vomiting.  No bowel/urinary symptoms.  No lower extremity pain/numbness/tingling.  No tooth pain.  Patient states he typically ambulates independently without use of assistive devices and lives with his wife in [place]. Daughter is at bedside.       Allergies    penicillin (Rash)    Intolerances    	    MEDICATIONS:  amLODIPine   Tablet 10 milliGRAM(s) Oral at bedtime  aspirin enteric coated 81 milliGRAM(s) Oral daily  enoxaparin Injectable 120 milliGRAM(s) SubCutaneous every 12 hours  metoprolol tartrate 25 milliGRAM(s) Oral every 12 hours        benztropine 2 milliGRAM(s) Oral every 12 hours  divalproex ER 1500 milliGRAM(s) Oral at bedtime  escitalopram 10 milliGRAM(s) Oral at bedtime  haloperidol     Tablet 20 milliGRAM(s) Oral at bedtime  melatonin 3 milliGRAM(s) Oral at bedtime PRN  QUEtiapine 400 milliGRAM(s) Oral at bedtime    famotidine    Tablet 20 milliGRAM(s) Oral daily    atorvastatin 80 milliGRAM(s) Oral at bedtime  dextrose 50% Injectable 25 Gram(s) IV Push once  dextrose 50% Injectable 12.5 Gram(s) IV Push once  dextrose 50% Injectable 25 Gram(s) IV Push once  dextrose Oral Gel 15 Gram(s) Oral once PRN  glucagon  Injectable 1 milliGRAM(s) IntraMuscular once  insulin glargine Injectable (LANTUS) 60 Unit(s) SubCutaneous at bedtime  insulin lispro (ADMELOG) corrective regimen sliding scale   SubCutaneous three times a day before meals  insulin lispro (ADMELOG) corrective regimen sliding scale   SubCutaneous at bedtime  insulin lispro Injectable (ADMELOG) 5 Unit(s) SubCutaneous three times a day before meals    dextrose 5%. 1000 milliLiter(s) IV Continuous <Continuous>  dextrose 5%. 1000 milliLiter(s) IV Continuous <Continuous>  sodium chloride 0.9% lock flush 3 milliLiter(s) IV Push every 8 hours      PAST MEDICAL & SURGICAL HISTORY:  Diabetes      HTN (Hypertension)      Hyperlipidemia      Bipolar Disorder      History of Seizure Disorder  patient denies      MI, old      Stented coronary artery  RCA      Sleep apnea  cannot tolerate CPAP      Triple vessel coronary artery disease      SHIN (obstructive sleep apnea)  s/p surgery (uvula ressection), cannot tolerate CPAP          FAMILY HISTORY:  Family history of prostate cancer (Father)    Family history of heart attack (Mother)  miguel ge 73        SOCIAL HISTORY:    The patient is a nonsmoker       REVIEW OF SYSTEMS:  See HPI, otherwise complete 14 point review of systems negative    [ x] All others negative	  [ ] Unable to obtain    PHYSICAL EXAM:  T(C): 36.4 (01-13-23 @ 05:26), Max: 36.9 (01-12-23 @ 20:21)  HR: 76 (01-13-23 @ 08:24) (75 - 77)  BP: 144/80 (01-13-23 @ 08:24) (129/79 - 144/80)  RR: 18 (01-13-23 @ 08:24) (18 - 20)  SpO2: 93% (01-13-23 @ 08:24) (92% - 96%)  Wt(kg): --  I&O's Summary    12 Jan 2023 07:01  -  13 Jan 2023 07:00  --------------------------------------------------------  IN: 1320 mL / OUT: 1750 mL / NET: -430 mL    13 Jan 2023 07:01  -  13 Jan 2023 09:27  --------------------------------------------------------  IN: 360 mL / OUT: 0 mL / NET: 360 mL        Appearance: No Acute Distress; resting comfortably  HEENT:  Normal oral mucosa, PERRL, EOMI	  Cardiovascular: Normal S1 S2, No JVD, No murmurs/rubs/gallops  Respiratory: Normal respiratory effort; Lungs clear to auscultation bilaterally  Gastrointestinal:  Soft, Non-tender, + BS	  Skin: No rashes, No ecchymoses, No cyanosis	  Neurologic: Non-focal; no weakness  Extremities: No clubbing, cyanosis or edema  Vascular: Peripheral pulses palpable 2+ bilaterally  Psychiatry: A & O x 3, Mood & affect appropriate    Laboratory Data:	 	    CBC Full  -  ( 13 Jan 2023 06:12 )  WBC Count : 7.36 K/uL  Hemoglobin : 12.5 g/dL  Hematocrit : 38.3 %  Platelet Count - Automated : 242 K/uL  Mean Cell Volume : 86.5 fl  Mean Cell Hemoglobin : 28.2 pg  Mean Cell Hemoglobin Concentration : 32.6 gm/dL  Auto Neutrophil # : 3.09 K/uL  Auto Lymphocyte # : 3.33 K/uL  Auto Monocyte # : 0.75 K/uL  Auto Eosinophil # : 0.12 K/uL  Auto Basophil # : 0.03 K/uL  Auto Neutrophil % : 42.1 %  Auto Lymphocyte % : 45.2 %  Auto Monocyte % : 10.2 %  Auto Eosinophil % : 1.6 %  Auto Basophil % : 0.4 %    01-13    138  |  101  |  14  ----------------------------<  143<H>  4.2   |  23  |  0.63  01-12    135  |  98  |  15  ----------------------------<  179<H>  3.7   |  21<L>  |  0.68    Ca    9.7      13 Jan 2023 06:11  Ca    9.6      12 Jan 2023 07:10    TPro  7.6  /  Alb  4.0  /  TBili  0.4  /  DBili  x   /  AST  23  /  ALT  20  /  AlkPhos  89  01-13  TPro  7.6  /  Alb  4.0  /  TBili  0.3  /  DBili  x   /  AST  18  /  ALT  19  /  AlkPhos  87  01-12      proBNP:   Lipid Profile:   HgA1c:   TSH:       CARDIAC MARKERS:            Interpretation of Telemetry: Sinus     ECG:  Sinus with IRBBB	  RADIOLOGY:  OTHER: 	    PREVIOUS DIAGNOSTIC TESTING:    [x  ] Echocardiogram: 1/12/2023: Normal LV systolic function.   [ ] Catheterization:  [ ] Stress Test:  	    Assessment:  57M /w Penicillin allergy, PMH  HTN, HLD, DM2, bipolar disorder, SHIN s/p uvula resection not on CPAP,  known CAD with MI with RCA/OM stents 2013 who presented to his Cardiologist complaining of progressive SOB/LEMOS over the last 4 weeks.    Plan of Care:    #Multivessel CAD  - Noted on [facility] 1/11/2023  - Pt tentatively scheduled to CABG with Dr. Hou on Tuesday  - TTE 1/12 shows normal LV systolic function  - Continue ASA, statin and BB  - Will continue to optimize for upcoming surgery    #HTN  - BP Acceptable  - Continue Amlodipine and BB    #HLD  - Statin as ordered    #DM  - ISS   - Defer management to primary team    #ACP (advance care planning)-  Advanced care planning was discussed with the patient and daughter.  Risks, benefits and alternatives of medical treatment and procedures were discussed in detail and all questions were answered. 30 additional minutes spent addressing advance care plans.        72 minutes spent on total encounter; more than 50% of the visit was spent counseling and/or coordinating care by the attending physician.   	  Kevin Landrum DO [facility]  Cardiovascular Diseases  [phone]    
      HPI:  Patient is a 57M /w Penicillin allergy, PMH  HTN, HLD, DM2, bipolar disorder, SHIN s/p uvula resection not on CPAP,  known CAD with MI with RCA/OM stents 2013 who presented to his Cardiologist complaining of progressive SOB/LEMOS over the last 4 weeks.  Denies having typical chest pain type symptoms.  Had abnormal stress test and underwent elective cath yesterday on 1/11/23 which revealed EF 65, pLAD 90, mLAD 20, dLAD 80, OM 99, mRCA 95 via RRA access.  Now transferred to [facility] for CABG eval /w Dr. Barrientos.  Currently patient denies chest pain/sob.  No headache/dizziness.  No abdominal pain/nausea/vomiting.  No bowel/urinary symptoms.  No lower extremity pain/numbness/tingling.  No tooth pain.  Patient states he typically ambulates independently without use of assistive devices and lives with his wife in [place].     (12 Jan 2023 07:07)  Patient has history of diabetes, on insulin at home, no recent hypoglycemic episodes, no polyuria polydipsia. Patient follows up with PCP for diabetes management.    PAST MEDICAL & SURGICAL HISTORY:  Diabetes      HTN (Hypertension)      Hyperlipidemia      Bipolar Disorder      History of Seizure Disorder  patient denies      MI, old      Stented coronary artery  RCA      Sleep apnea  cannot tolerate CPAP      Triple vessel coronary artery disease      SHIN (obstructive sleep apnea)  s/p surgery (uvula ressection), cannot tolerate CPAP          FAMILY HISTORY:  Family history of prostate cancer (Father)    Family history of heart attack (Mother)  miguel ge 73        Social History:    Outpatient Medications:    MEDICATIONS  (STANDING):  amLODIPine   Tablet 10 milliGRAM(s) Oral at bedtime  aspirin enteric coated 81 milliGRAM(s) Oral daily  atorvastatin 80 milliGRAM(s) Oral at bedtime  benztropine 2 milliGRAM(s) Oral every 12 hours  dextrose 5%. 1000 milliLiter(s) (50 mL/Hr) IV Continuous <Continuous>  dextrose 5%. 1000 milliLiter(s) (100 mL/Hr) IV Continuous <Continuous>  dextrose 50% Injectable 25 Gram(s) IV Push once  dextrose 50% Injectable 12.5 Gram(s) IV Push once  dextrose 50% Injectable 25 Gram(s) IV Push once  divalproex ER 1500 milliGRAM(s) Oral at bedtime  enoxaparin Injectable 120 milliGRAM(s) SubCutaneous every 12 hours  escitalopram 10 milliGRAM(s) Oral at bedtime  famotidine    Tablet 20 milliGRAM(s) Oral daily  glucagon  Injectable 1 milliGRAM(s) IntraMuscular once  haloperidol     Tablet 20 milliGRAM(s) Oral at bedtime  insulin glargine Injectable (LANTUS) 50 Unit(s) SubCutaneous at bedtime  insulin lispro (ADMELOG) corrective regimen sliding scale   SubCutaneous three times a day before meals  insulin lispro (ADMELOG) corrective regimen sliding scale   SubCutaneous at bedtime  insulin lispro Injectable (ADMELOG) 8 Unit(s) SubCutaneous three times a day before meals  metoprolol tartrate 25 milliGRAM(s) Oral every 12 hours  QUEtiapine 400 milliGRAM(s) Oral at bedtime  sodium chloride 0.9% lock flush 3 milliLiter(s) IV Push every 8 hours    MEDICATIONS  (PRN):  dextrose Oral Gel 15 Gram(s) Oral once PRN Blood Glucose LESS THAN 70 milliGRAM(s)/deciliter  melatonin 3 milliGRAM(s) Oral at bedtime PRN Sleep  zolpidem 5 milliGRAM(s) Oral at bedtime PRN Insomnia      Allergies    penicillin (Rash)    Intolerances      Review of Systems:  Constitutional: No fever, no chills  Eyes: No blurry vision  Neuro: No tremors  HEENT: No pain, no neck swelling  Cardiovascular: No chest pain, no palpitations  Respiratory: No SOB, no cough  GI: No nausea, vomiting, abdominal pain  : No dysuria  Skin: no rash  MSK: Has leg swelling.  Psych: no depression  Endocrine: no polyuria, polydipsia    UNABLE TO OBTAIN    ALL OTHER SYSTEMS REVIEWED AND NEGATIVE        PHYSICAL EXAM:  VITALS: T(C): 36.6 (01-14-23 @ 12:06)  T(F): 97.9 (01-14-23 @ 12:06), Max: 98.2 (01-14-23 @ 04:15)  HR: 76 (01-14-23 @ 12:06) (72 - 93)  BP: 131/74 (01-14-23 @ 12:06) (131/74 - 137/79)  RR:  (18 - 18)  SpO2:  (94% - 95%)  Wt(kg): --  GENERAL: NAD, well-groomed, well-developed  EYES: No proptosis, no lid lag  HEENT:  Atraumatic, Normocephalic  THYROID: Normal size, no palpable nodules  RESPIRATORY: Clear to auscultation bilaterally; No rales, rhonchi, wheezing  CARDIOVASCULAR: Si S2, No murmurs;  GI: Soft, non distended, normal bowel sounds  SKIN: Dry, intact, No rashes or lesions  MUSCULOSKELETAL: Has BL lower extremity edema.  NEURO:  no tremor, sensation decreased in feet BL,    POCT Blood Glucose.: 117 mg/dL (01-14-23 @ 16:29)  POCT Blood Glucose.: 142 mg/dL (01-14-23 @ 11:06)  POCT Blood Glucose.: 135 mg/dL (01-14-23 @ 07:35)  POCT Blood Glucose.: 141 mg/dL (01-13-23 @ 21:35)  POCT Blood Glucose.: 146 mg/dL (01-13-23 @ 16:18)  POCT Blood Glucose.: 176 mg/dL (01-13-23 @ 11:13)  POCT Blood Glucose.: 143 mg/dL (01-13-23 @ 07:43)  POCT Blood Glucose.: 162 mg/dL (01-12-23 @ 21:40)  POCT Blood Glucose.: 188 mg/dL (01-12-23 @ 16:21)  POCT Blood Glucose.: 246 mg/dL (01-12-23 @ 11:24)  POCT Blood Glucose.: 150 mg/dL (01-11-23 @ 21:49)  POCT Blood Glucose.: 205 mg/dL (01-11-23 @ 19:41)                            12.0   9.61  )-----------( 238      ( 14 Jan 2023 06:01 )             36.6       01-14    139  |  103  |  14  ----------------------------<  56<L>  3.8   |  20<L>  |  0.66    eGFR: 109    Ca    9.4      01-14  Mg     2.0     01-14  Phos  3.7     01-14    TPro  7.4  /  Alb  3.7  /  TBili  0.2  /  DBili  x   /  AST  30  /  ALT  28  /  AlkPhos  89  01-14      Thyroid Function Tests:  01-13 @ 06:10 TSH 6.37 FreeT4 -- T3 90 Anti TPO -- Anti Thyroglobulin Ab -- TSI --  01-12 @ 07:04 TSH 6.69 FreeT4 1.1 T3 -- Anti TPO -- Anti Thyroglobulin Ab -- TSI --          01-12 Chol 182 Direct LDL -- LDL calculated 99 HDL 42 Trig 204<H>    Radiology:             
    Patient is a 57y old  Male who presents with a chief complaint of CAD for CABG eval       HPI:     57M /w Penicillin allergy, PMH  HTN, HLD, DM2, bipolar disorder, SHIN s/p uvula resection not on CPAP,  known CAD with MI with RCA/OM stents  who presented to his Cardiologist complaining of progressive SOB/LEMOS over the last 4 weeks.  Denies having typical chest pain type symptoms.  Had abnormal stress test and underwent elective cath yesterday on 23 which revealed EF 65, pLAD 90, mLAD 20, dLAD 80, OM 99, mRCA 95 via RRA access.   Currently patient denies chest pain/sob.  No headache/dizziness.  No abdominal pain/nausea/vomiting.  No bowel/urinary symptoms.  No lower extremity pain/numbness/tingling.  No tooth pain.  Patient states he typically ambulates independently without use of assistive devices and lives with his wife in [place].        PAST MEDICAL & SURGICAL HISTORY:  Diabetes      HTN (Hypertension)      Hyperlipidemia      Bipolar Disorder      History of Seizure Disorder  patient denies      MI, old      Stented coronary artery  RCA      Sleep apnea  cannot tolerate CPAP      Triple vessel coronary artery disease      SHIN (obstructive sleep apnea)  s/p surgery (uvula ressection), cannot tolerate CPAP          Social History: No smoking etc.     FAMILY HISTORY:  Family history of prostate cancer (Father)    Family history of heart attack (Mother)  miguel ge 73        Allergies    penicillin (Rash)    Intolerances        REVIEW OF SYSTEMS:    CONSTITUTIONAL: No fever, weight loss, or fatigue  EYES: No eye pain, visual disturbances, or discharge  RESPIRATORY: No cough, wheezing, chills or hemoptysis; No shortness of breath  CARDIOVASCULAR: No chest pain, palpitations, dizziness, or leg swelling  GASTROINTESTINAL: No abdominal or epigastric pain. No nausea, vomiting, or hematemesis; No diarrhea or constipation. No melena or hematochezia.  GENITOURINARY: No dysuria, frequency, hematuria, or incontinence  NEUROLOGICAL: No headaches, memory loss, loss of strength, numbness, or tremors  SKIN: No itching, burning, rashes, or lesions   ENDOCRINE: No heat or cold intolerance; No hair loss  MUSCULOSKELETAL: No joint pain or swelling; No muscle, back, or extremity pain  PSYCHIATRIC: No depression, anxiety, mood swings, or difficulty sleeping      MEDICATIONS  (STANDING):  amLODIPine   Tablet 10 milliGRAM(s) Oral at bedtime  aspirin enteric coated 81 milliGRAM(s) Oral daily  atorvastatin 80 milliGRAM(s) Oral at bedtime  benztropine 2 milliGRAM(s) Oral every 12 hours  dextrose 5%. 1000 milliLiter(s) (50 mL/Hr) IV Continuous <Continuous>  dextrose 5%. 1000 milliLiter(s) (100 mL/Hr) IV Continuous <Continuous>  dextrose 50% Injectable 25 Gram(s) IV Push once  dextrose 50% Injectable 12.5 Gram(s) IV Push once  dextrose 50% Injectable 25 Gram(s) IV Push once  divalproex ER 1500 milliGRAM(s) Oral at bedtime  escitalopram 10 milliGRAM(s) Oral at bedtime  famotidine    Tablet 20 milliGRAM(s) Oral daily  glucagon  Injectable 1 milliGRAM(s) IntraMuscular once  haloperidol     Tablet 20 milliGRAM(s) Oral at bedtime  insulin glargine Injectable (LANTUS) 60 Unit(s) SubCutaneous at bedtime  insulin lispro (ADMELOG) corrective regimen sliding scale   SubCutaneous three times a day before meals  insulin lispro (ADMELOG) corrective regimen sliding scale   SubCutaneous at bedtime  insulin lispro Injectable (ADMELOG) 5 Unit(s) SubCutaneous three times a day before meals  metoprolol tartrate 25 milliGRAM(s) Oral every 12 hours  QUEtiapine 400 milliGRAM(s) Oral at bedtime  sodium chloride 0.9% lock flush 3 milliLiter(s) IV Push every 8 hours    MEDICATIONS  (PRN):  dextrose Oral Gel 15 Gram(s) Oral once PRN Blood Glucose LESS THAN 70 milliGRAM(s)/deciliter  melatonin 3 milliGRAM(s) Oral at bedtime PRN Sleep      Vital Signs Last 24 Hrs  T(C): 36.4 (2023 11:26), Max: 36.9 (2023 18:58)  T(F): 97.5 (2023 11:26), Max: 98.4 (2023 18:58)  HR: 77 (2023 11:26) (74 - 97)  BP: 129/79 (2023 11:26) (129/79 - 160/90)  BP(mean): 100 (2023 05:45) (100 - 100)  RR: 18 (2023 11:26) (18 - 18)  SpO2: 96% (2023 11:26) (94% - 97%)    Parameters below as of 2023 11:26  Patient On (Oxygen Delivery Method): room air        PHYSICAL EXAM:    GENERAL: NAD, well-groomed, well-developed  HEAD:  Atraumatic, Normocephalic  NECK: Supple, No JVD, Normal thyroid  NERVOUS SYSTEM:  Alert & Oriented X3, No new  focal sign .  CHEST/LUNG: Air entry good bilaterally; No rales, rhonchi, wheezing, or rubs  HEART: Regular rate and rhythm; No murmurs, rubs, or gallops  ABDOMEN: Soft, Nontender, Nondistended; Bowel sounds present  EXTREMITIES:  2+ Peripheral Pulses, No clubbing, cyanosis, or edema      LABS:                        11.7   7.40  )-----------( 269      ( 2023 07:12 )             36.7     01-12    135  |  98  |  15  ----------------------------<  179<H>  3.7   |  21<L>  |  0.68    Ca    9.6      2023 07:10    TPro  7.6  /  Alb  4.0  /  TBili  0.3  /  DBili  x   /  AST  18  /  ALT  19  /  AlkPhos  87  -12    PT/INR - ( 2023 07:12 )   PT: 12.2 sec;   INR: 1.06 ratio         PTT - ( 2023 07:12 )  PTT:29.1 sec  Urinalysis Basic - ( 2023 07:12 )    Color: Light Yellow / Appearance: Clear / S.027 / pH: x  Gluc: x / Ketone: Trace  / Bili: Negative / Urobili: Negative   Blood: x / Protein: Negative / Nitrite: Negative   Leuk Esterase: Negative / RBC: x / WBC x   Sq Epi: x / Non Sq Epi: x / Bacteria: x          RADIOLOGY & ADDITIONAL STUDIES:

## 2023-01-14 NOTE — CONSULT NOTE ADULT - PROBLEM SELECTOR RECOMMENDATION 3
Suggest to continue medications, monitoring, FU primary team recommendations. .
BP meds with hold parameters.

## 2023-01-14 NOTE — CONSULT NOTE ADULT - PROBLEM SELECTOR RECOMMENDATION 2
Uncontrolled as HgbA1C high so will benefit from endo consult. Lantus and SSI for now.
On medications,  no chest pain, stable, monitored and followed up by primary cardiothoracic team/cardiology team

## 2023-01-14 NOTE — PROGRESS NOTE ADULT - SUBJECTIVE AND OBJECTIVE BOX
Cardiovascular Disease Progress Note  Date of service: 23 @ 09:19    Overnight events: No acute events overnight.  Pt is in no distress.   Otherwise review of systems negative    Objective Findings:  T(C): 36.8 (23 @ 04:15), Max: 36.8 (23 @ 04:15)  HR: 84 (23 @ 07:53) (72 - 93)  BP: 135/84 (23 @ 07:53) (131/77 - 145/74)  RR: 18 (23 @ 04:15) (18 - 18)  SpO2: 95% (23 @ 04:15) (95% - 95%)  Wt(kg): --  Daily     Daily Weight in k.2 (2023 07:48)      Physical Exam:  Gen: NAD; Patient resting comfortably  HEENT: EOMI, Normocephalic/ atraumatic  CV: RRR, normal S1 + S2, no m/r/g  Lungs:  Normal respiratory effort; clear to auscultation bilaterally  Abd: soft, non-tender; bowel sounds present  Ext: No edema; warm and well perfused    Telemetry: Sinus     Laboratory Data:                        12.0   9.61  )-----------( 238      ( 2023 06:01 )             36.6         139  |  103  |  14  ----------------------------<  56<L>  3.8   |  20<L>  |  0.66    Ca    9.4      2023 06:01  Phos  3.7       Mg     2.0         TPro  7.4  /  Alb  3.7  /  TBili  0.2  /  DBili  x   /  AST  30  /  ALT  28  /  AlkPhos  89                Inpatient Medications:  MEDICATIONS  (STANDING):  amLODIPine   Tablet 10 milliGRAM(s) Oral at bedtime  aspirin enteric coated 81 milliGRAM(s) Oral daily  atorvastatin 80 milliGRAM(s) Oral at bedtime  benztropine 2 milliGRAM(s) Oral every 12 hours  dextrose 5%. 1000 milliLiter(s) (50 mL/Hr) IV Continuous <Continuous>  dextrose 5%. 1000 milliLiter(s) (100 mL/Hr) IV Continuous <Continuous>  dextrose 50% Injectable 25 Gram(s) IV Push once  dextrose 50% Injectable 12.5 Gram(s) IV Push once  dextrose 50% Injectable 25 Gram(s) IV Push once  divalproex ER 1500 milliGRAM(s) Oral at bedtime  enoxaparin Injectable 120 milliGRAM(s) SubCutaneous every 12 hours  escitalopram 10 milliGRAM(s) Oral at bedtime  famotidine    Tablet 20 milliGRAM(s) Oral daily  glucagon  Injectable 1 milliGRAM(s) IntraMuscular once  haloperidol     Tablet 20 milliGRAM(s) Oral at bedtime  insulin glargine Injectable (LANTUS) 65 Unit(s) SubCutaneous at bedtime  insulin lispro (ADMELOG) corrective regimen sliding scale   SubCutaneous three times a day before meals  insulin lispro (ADMELOG) corrective regimen sliding scale   SubCutaneous at bedtime  insulin lispro Injectable (ADMELOG) 5 Unit(s) SubCutaneous three times a day before meals  metoprolol tartrate 25 milliGRAM(s) Oral every 12 hours  QUEtiapine 400 milliGRAM(s) Oral at bedtime  sodium chloride 0.9% lock flush 3 milliLiter(s) IV Push every 8 hours      Assessment:  57M /w Penicillin allergy, PMH  HTN, HLD, DM2, bipolar disorder, SHIN s/p uvula resection not on CPAP,  known CAD with MI with RCA/OM stents  who presented to his Cardiologist complaining of progressive SOB/LEMOS over the last 4 weeks.    Plan of Care:    #Multivessel CAD  - Noted on [facility] 2023  - Pt tentatively scheduled to CABG with Dr. Hou next week  - TTE  shows normal LV systolic function  - Continue ASA, statin and BB    #HTN  - BP Acceptable  - Continue Amlodipine and BB    #HLD  - Statin as ordered    #DM  - ISS   - Defer management to primary team      Plan of Care:          Over 25 minutes spent on total encounter; more than 50% of the visit was spent counseling and/or coordinating care by the attending physician.      Kevin Landrum DO [facility]  Cardiovascular Disease  [phone]

## 2023-01-15 LAB
ALBUMIN SERPL ELPH-MCNC: 3.7 G/DL — SIGNIFICANT CHANGE UP (ref 3.3–5)
ALP SERPL-CCNC: 84 U/L — SIGNIFICANT CHANGE UP (ref 40–120)
ALT FLD-CCNC: 32 U/L — SIGNIFICANT CHANGE UP (ref 10–45)
ANION GAP SERPL CALC-SCNC: 10 MMOL/L — SIGNIFICANT CHANGE UP (ref 5–17)
AST SERPL-CCNC: 29 U/L — SIGNIFICANT CHANGE UP (ref 10–40)
BILIRUB SERPL-MCNC: 0.2 MG/DL — SIGNIFICANT CHANGE UP (ref 0.2–1.2)
BUN SERPL-MCNC: 12 MG/DL — SIGNIFICANT CHANGE UP (ref 7–23)
CALCIUM SERPL-MCNC: 9.1 MG/DL — SIGNIFICANT CHANGE UP (ref 8.4–10.5)
CHLORIDE SERPL-SCNC: 105 MMOL/L — SIGNIFICANT CHANGE UP (ref 96–108)
CO2 SERPL-SCNC: 26 MMOL/L — SIGNIFICANT CHANGE UP (ref 22–31)
CREAT SERPL-MCNC: 0.66 MG/DL — SIGNIFICANT CHANGE UP (ref 0.5–1.3)
EGFR: 109 ML/MIN/1.73M2 — SIGNIFICANT CHANGE UP
GLUCOSE BLDC GLUCOMTR-MCNC: 112 MG/DL — HIGH (ref 70–99)
GLUCOSE BLDC GLUCOMTR-MCNC: 151 MG/DL — HIGH (ref 70–99)
GLUCOSE BLDC GLUCOMTR-MCNC: 192 MG/DL — HIGH (ref 70–99)
GLUCOSE BLDC GLUCOMTR-MCNC: 94 MG/DL — SIGNIFICANT CHANGE UP (ref 70–99)
GLUCOSE SERPL-MCNC: 96 MG/DL — SIGNIFICANT CHANGE UP (ref 70–99)
HCT VFR BLD CALC: 36 % — LOW (ref 39–50)
HGB BLD-MCNC: 11.6 G/DL — LOW (ref 13–17)
MCHC RBC-ENTMCNC: 28 PG — SIGNIFICANT CHANGE UP (ref 27–34)
MCHC RBC-ENTMCNC: 32.2 GM/DL — SIGNIFICANT CHANGE UP (ref 32–36)
MCV RBC AUTO: 87 FL — SIGNIFICANT CHANGE UP (ref 80–100)
NRBC # BLD: 0 /100 WBCS — SIGNIFICANT CHANGE UP (ref 0–0)
PA ADP PRP-ACNC: 184 PRU — LOW (ref 194–417)
PLATELET # BLD AUTO: 244 K/UL — SIGNIFICANT CHANGE UP (ref 150–400)
POTASSIUM SERPL-MCNC: 3.5 MMOL/L — SIGNIFICANT CHANGE UP (ref 3.5–5.3)
POTASSIUM SERPL-SCNC: 3.5 MMOL/L — SIGNIFICANT CHANGE UP (ref 3.5–5.3)
PROT SERPL-MCNC: 7 G/DL — SIGNIFICANT CHANGE UP (ref 6–8.3)
RBC # BLD: 4.14 M/UL — LOW (ref 4.2–5.8)
RBC # FLD: 14.6 % — HIGH (ref 10.3–14.5)
SODIUM SERPL-SCNC: 141 MMOL/L — SIGNIFICANT CHANGE UP (ref 135–145)
T4 FREE SERPL-MCNC: 1.1 NG/DL — SIGNIFICANT CHANGE UP (ref 0.9–1.8)
TSH SERPL-MCNC: 2.94 UIU/ML — SIGNIFICANT CHANGE UP (ref 0.27–4.2)
WBC # BLD: 7.58 K/UL — SIGNIFICANT CHANGE UP (ref 3.8–10.5)
WBC # FLD AUTO: 7.58 K/UL — SIGNIFICANT CHANGE UP (ref 3.8–10.5)

## 2023-01-15 PROCEDURE — 99231 SBSQ HOSP IP/OBS SF/LOW 25: CPT

## 2023-01-15 RX ORDER — INSULIN GLARGINE 100 [IU]/ML
44 INJECTION, SOLUTION SUBCUTANEOUS AT BEDTIME
Refills: 0 | Status: DISCONTINUED | OUTPATIENT
Start: 2023-01-15 | End: 2023-01-17

## 2023-01-15 RX ORDER — POTASSIUM CHLORIDE 20 MEQ
20 PACKET (EA) ORAL ONCE
Refills: 0 | Status: COMPLETED | OUTPATIENT
Start: 2023-01-15 | End: 2023-01-15

## 2023-01-15 RX ADMIN — ATORVASTATIN CALCIUM 80 MILLIGRAM(S): 80 TABLET, FILM COATED ORAL at 21:13

## 2023-01-15 RX ADMIN — Medication 20 MILLIEQUIVALENT(S): at 12:48

## 2023-01-15 RX ADMIN — Medication 8 UNIT(S): at 11:05

## 2023-01-15 RX ADMIN — Medication 25 MILLIGRAM(S): at 08:34

## 2023-01-15 RX ADMIN — Medication 1: at 10:05

## 2023-01-15 RX ADMIN — ESCITALOPRAM OXALATE 10 MILLIGRAM(S): 10 TABLET, FILM COATED ORAL at 21:12

## 2023-01-15 RX ADMIN — AMLODIPINE BESYLATE 10 MILLIGRAM(S): 2.5 TABLET ORAL at 21:09

## 2023-01-15 RX ADMIN — Medication 2 MILLIGRAM(S): at 20:35

## 2023-01-15 RX ADMIN — SODIUM CHLORIDE 3 MILLILITER(S): 9 INJECTION INTRAMUSCULAR; INTRAVENOUS; SUBCUTANEOUS at 06:46

## 2023-01-15 RX ADMIN — DIVALPROEX SODIUM 1500 MILLIGRAM(S): 500 TABLET, DELAYED RELEASE ORAL at 21:10

## 2023-01-15 RX ADMIN — QUETIAPINE FUMARATE 400 MILLIGRAM(S): 200 TABLET, FILM COATED ORAL at 21:10

## 2023-01-15 RX ADMIN — SODIUM CHLORIDE 3 MILLILITER(S): 9 INJECTION INTRAMUSCULAR; INTRAVENOUS; SUBCUTANEOUS at 21:18

## 2023-01-15 RX ADMIN — Medication 8 UNIT(S): at 08:32

## 2023-01-15 RX ADMIN — Medication 3 MILLIGRAM(S): at 23:05

## 2023-01-15 RX ADMIN — FAMOTIDINE 20 MILLIGRAM(S): 10 INJECTION INTRAVENOUS at 12:45

## 2023-01-15 RX ADMIN — Medication 25 MILLIGRAM(S): at 20:35

## 2023-01-15 RX ADMIN — HALOPERIDOL DECANOATE 20 MILLIGRAM(S): 100 INJECTION INTRAMUSCULAR at 21:11

## 2023-01-15 RX ADMIN — SODIUM CHLORIDE 3 MILLILITER(S): 9 INJECTION INTRAMUSCULAR; INTRAVENOUS; SUBCUTANEOUS at 12:45

## 2023-01-15 RX ADMIN — Medication 2 MILLIGRAM(S): at 08:33

## 2023-01-15 RX ADMIN — Medication 8 UNIT(S): at 17:10

## 2023-01-15 RX ADMIN — SENNA PLUS 2 TABLET(S): 8.6 TABLET ORAL at 21:11

## 2023-01-15 RX ADMIN — ENOXAPARIN SODIUM 120 MILLIGRAM(S): 100 INJECTION SUBCUTANEOUS at 17:10

## 2023-01-15 RX ADMIN — ZOLPIDEM TARTRATE 5 MILLIGRAM(S): 10 TABLET ORAL at 21:12

## 2023-01-15 RX ADMIN — INSULIN GLARGINE 44 UNIT(S): 100 INJECTION, SOLUTION SUBCUTANEOUS at 21:25

## 2023-01-15 RX ADMIN — ENOXAPARIN SODIUM 120 MILLIGRAM(S): 100 INJECTION SUBCUTANEOUS at 05:48

## 2023-01-15 RX ADMIN — Medication 81 MILLIGRAM(S): at 12:45

## 2023-01-15 NOTE — PROGRESS NOTE ADULT - SUBJECTIVE AND OBJECTIVE BOX
VITAL SIGNS    Telemetry:  70-80  Vital Signs Last 24 Hrs  T(C): 36.4 (01-15-23 @ 04:13), Max: 36.8 (23 @ 20:21)  T(F): 97.5 (01-15-23 @ 04:13), Max: 98.2 (23 @ 20:21)  HR: 82 (01-15-23 @ 08:38) (74 - 93)  BP: 147/89 (01-15-23 @ 08:38) (131/74 - 163/87)  RR: 18 (01-15-23 @ 04:13) (18 - 18)  SpO2: 95% (01-15-23 @ 04:13) (94% - 97%)             07:01  -  01-15 @ 07:00  --------------------------------------------------------  IN: 1320 mL / OUT: 2400 mL / NET: -1080 mL    01-15 @ 07:01  -  01-15 @ 11:58  --------------------------------------------------------  IN: 720 mL / OUT: 400 mL / NET: 320 mL       Daily     Daily Weight in k.6 (15 Marco 2023 10:11)  Admit Wt: Drug Dosing Weight  Height (cm): 172.7 (2023 05:45)  Weight (kg): 115.6 (2023 05:45)  BMI (kg/m2): 38.8 (2023 05:45)  BSA (m2): 2.27 (2023 05:45)    Bilirubin Total, Serum: 0.2 mg/dL (01-15 @ 06:13)    CAPILLARY BLOOD GLUCOSE      POCT Blood Glucose.: 192 mg/dL (15 Marco 2023 11:08)  POCT Blood Glucose.: 94 mg/dL (15 Marco 2023 07:29)  POCT Blood Glucose.: 148 mg/dL (2023 21:42)  POCT Blood Glucose.: 117 mg/dL (2023 16:29)          MEDICATIONS  amLODIPine   Tablet 10 milliGRAM(s) Oral at bedtime  aspirin enteric coated 81 milliGRAM(s) Oral daily  atorvastatin 80 milliGRAM(s) Oral at bedtime  benztropine 2 milliGRAM(s) Oral every 12 hours  dextrose 5%. 1000 milliLiter(s) IV Continuous <Continuous>  dextrose 5%. 1000 milliLiter(s) IV Continuous <Continuous>  dextrose 50% Injectable 25 Gram(s) IV Push once  dextrose 50% Injectable 12.5 Gram(s) IV Push once  dextrose 50% Injectable 25 Gram(s) IV Push once  dextrose Oral Gel 15 Gram(s) Oral once PRN  divalproex ER 1500 milliGRAM(s) Oral at bedtime  enoxaparin Injectable 120 milliGRAM(s) SubCutaneous every 12 hours  escitalopram 10 milliGRAM(s) Oral at bedtime  famotidine    Tablet 20 milliGRAM(s) Oral daily  glucagon  Injectable 1 milliGRAM(s) IntraMuscular once  haloperidol     Tablet 20 milliGRAM(s) Oral at bedtime  insulin glargine Injectable (LANTUS) 44 Unit(s) SubCutaneous at bedtime  insulin lispro (ADMELOG) corrective regimen sliding scale   SubCutaneous three times a day before meals  insulin lispro (ADMELOG) corrective regimen sliding scale   SubCutaneous at bedtime  insulin lispro Injectable (ADMELOG) 8 Unit(s) SubCutaneous three times a day before meals  melatonin 3 milliGRAM(s) Oral at bedtime PRN  metoprolol tartrate 25 milliGRAM(s) Oral every 12 hours  potassium chloride    Tablet ER 20 milliEquivalent(s) Oral once  QUEtiapine 400 milliGRAM(s) Oral at bedtime  senna 2 Tablet(s) Oral at bedtime  sodium chloride 0.9% lock flush 3 milliLiter(s) IV Push every 8 hours  zolpidem 5 milliGRAM(s) Oral at bedtime PRN      >>> <<<  PHYSICAL EXAM  Subjective: NAd  Neurology: alert and oriented x 3, nonfocal, no gross deficits  CV : s1s2  Lungs: CTA b/l  Abdomen: soft, NT,ND, ( +)BM  :  voiding  Extremities: -c/c/e       LABS  01-15    141  |  105  |  12  ----------------------------<  96  3.5   |  26  |  0.66    Ca    9.1      15 Marco 2023 06:13  Phos  3.7     -  Mg     2.0     -    TPro  7.0  /  Alb  3.7  /  TBili  0.2  /  DBili  x   /  AST  29  /  ALT  32  /  AlkPhos  84  01-15                                 11.6   7.58  )-----------( 244      ( 15 Marco 2023 06:13 )             36.0                 PAST MEDICAL & SURGICAL HISTORY:  Diabetes      HTN (Hypertension)      Hyperlipidemia      Bipolar Disorder      History of Seizure Disorder  patient denies      MI, old      Stented coronary artery  RCA      Sleep apnea  cannot tolerate CPAP      Triple vessel coronary artery disease      SHIN (obstructive sleep apnea)  s/p surgery (uvula ressection), cannot tolerate CPAP

## 2023-01-15 NOTE — PROGRESS NOTE ADULT - SUBJECTIVE AND OBJECTIVE BOX
Date of Service  : 01-15-23     INTERVAL HPI/OVERNIGHT EVENTS: I feel fine.   Vital Signs Last 24 Hrs  T(C): 36.5 (15 Marco 2023 12:09), Max: 36.8 (14 Jan 2023 20:21)  T(F): 97.7 (15 Marco 2023 12:09), Max: 98.2 (14 Jan 2023 20:21)  HR: 76 (15 Marco 2023 12:09) (74 - 93)  BP: 130/84 (15 Marco 2023 12:09) (130/84 - 163/87)  BP(mean): 108 (15 Marco 2023 08:38) (108 - 108)  RR: 18 (15 Marco 2023 12:09) (18 - 18)  SpO2: 93% (15 Marco 2023 12:09) (93% - 97%)    Parameters below as of 15 Marco 2023 12:09  Patient On (Oxygen Delivery Method): room air      I&O's Summary    14 Jan 2023 07:01  -  15 Marco 2023 07:00  --------------------------------------------------------  IN: 1320 mL / OUT: 2400 mL / NET: -1080 mL    15 Marco 2023 07:01  -  15 Marco 2023 18:03  --------------------------------------------------------  IN: 960 mL / OUT: 1125 mL / NET: -165 mL      MEDICATIONS  (STANDING):  amLODIPine   Tablet 10 milliGRAM(s) Oral at bedtime  aspirin enteric coated 81 milliGRAM(s) Oral daily  atorvastatin 80 milliGRAM(s) Oral at bedtime  benztropine 2 milliGRAM(s) Oral every 12 hours  dextrose 5%. 1000 milliLiter(s) (100 mL/Hr) IV Continuous <Continuous>  dextrose 5%. 1000 milliLiter(s) (50 mL/Hr) IV Continuous <Continuous>  dextrose 50% Injectable 25 Gram(s) IV Push once  dextrose 50% Injectable 12.5 Gram(s) IV Push once  dextrose 50% Injectable 25 Gram(s) IV Push once  divalproex ER 1500 milliGRAM(s) Oral at bedtime  enoxaparin Injectable 120 milliGRAM(s) SubCutaneous every 12 hours  escitalopram 10 milliGRAM(s) Oral at bedtime  famotidine    Tablet 20 milliGRAM(s) Oral daily  glucagon  Injectable 1 milliGRAM(s) IntraMuscular once  haloperidol     Tablet 20 milliGRAM(s) Oral at bedtime  insulin glargine Injectable (LANTUS) 44 Unit(s) SubCutaneous at bedtime  insulin lispro (ADMELOG) corrective regimen sliding scale   SubCutaneous at bedtime  insulin lispro (ADMELOG) corrective regimen sliding scale   SubCutaneous three times a day before meals  insulin lispro Injectable (ADMELOG) 8 Unit(s) SubCutaneous three times a day before meals  metoprolol tartrate 25 milliGRAM(s) Oral every 12 hours  QUEtiapine 400 milliGRAM(s) Oral at bedtime  senna 2 Tablet(s) Oral at bedtime  sodium chloride 0.9% lock flush 3 milliLiter(s) IV Push every 8 hours    MEDICATIONS  (PRN):  dextrose Oral Gel 15 Gram(s) Oral once PRN Blood Glucose LESS THAN 70 milliGRAM(s)/deciliter  melatonin 3 milliGRAM(s) Oral at bedtime PRN Sleep  zolpidem 5 milliGRAM(s) Oral at bedtime PRN Insomnia    LABS:                        11.6   7.58  )-----------( 244      ( 15 Marco 2023 06:13 )             36.0     01-15    141  |  105  |  12  ----------------------------<  96  3.5   |  26  |  0.66    Ca    9.1      15 Marco 2023 06:13  Phos  3.7     01-14  Mg     2.0     01-14    TPro  7.0  /  Alb  3.7  /  TBili  0.2  /  DBili  x   /  AST  29  /  ALT  32  /  AlkPhos  84  01-15        CAPILLARY BLOOD GLUCOSE      POCT Blood Glucose.: 112 mg/dL (15 Marco 2023 16:24)  POCT Blood Glucose.: 192 mg/dL (15 Marco 2023 11:08)  POCT Blood Glucose.: 94 mg/dL (15 Marco 2023 07:29)  POCT Blood Glucose.: 148 mg/dL (14 Jan 2023 21:42)          REVIEW OF SYSTEMS:  CONSTITUTIONAL: No fever, weight loss, or fatigue  EYES: No eye pain, visual disturbances, or discharge  ENMT:  No difficulty hearing, tinnitus, vertigo; No sinus or throat pain  NECK: No pain or stiffness  RESPIRATORY: No cough, wheezing, chills or hemoptysis; No shortness of breath  CARDIOVASCULAR: No chest pain, palpitations, dizziness, or leg swelling  GASTROINTESTINAL: No abdominal or epigastric pain. No nausea, vomiting, or hematemesis; No diarrhea or constipation. No melena or hematochezia.  GENITOURINARY: No dysuria, frequency, hematuria, or incontinence  NEUROLOGICAL: No headaches, memory loss, loss of strength, numbness, or tremors  SKIN: No itching, burning, rashes, or lesions   ENDOCRINE: No heat or cold intolerance; No hair loss      Consultant(s) Notes Reviewed:  [x ] YES  [ ] NO    PHYSICAL EXAM:  GENERAL: NAD, well-groomed, well-developed ,not in any distress ,  HEAD:  Atraumatic, Normocephalic  NECK: Supple, No JVD, Normal thyroid  NERVOUS SYSTEM:  Alert & Oriented X3, No focal deficit   CHEST/LUNG: Good air entry bilateral with no  rales, rhonchi, wheezing, or rubs  HEART: Regular rate and rhythm; No murmurs, rubs, or gallops  ABDOMEN: Soft, Nontender, Nondistended; Bowel sounds present  EXTREMITIES:  2+ Peripheral Pulses, No clubbing, cyanosis, or edema    Care Discussed with Consultants/Other Providers [ x] YES  [ ] NO

## 2023-01-15 NOTE — PROGRESS NOTE ADULT - SUBJECTIVE AND OBJECTIVE BOX
Cardiovascular Disease Progress Note  Date of service: 01-15-23 @ 11:38    Overnight events: No acute events overnight.  Pt denies chest pain or SOB  Otherwise review of systems negative    Objective Findings:  T(C): 36.4 (01-15-23 @ 04:13), Max: 36.8 (23 @ 20:21)  HR: 82 (01-15-23 @ 08:38) (74 - 93)  BP: 147/89 (01-15-23 @ 08:38) (131/74 - 163/87)  RR: 18 (01-15-23 @ 04:13) (18 - 18)  SpO2: 95% (01-15-23 @ 04:13) (94% - 97%)  Wt(kg): --  Daily     Daily Weight in k.6 (15 Marco 2023 10:11)      Physical Exam:  Gen: NAD; Patient resting comfortably  HEENT: EOMI, Normocephalic/ atraumatic  CV: RRR, normal S1 + S2, no m/r/g  Lungs:  Normal respiratory effort; clear to auscultation bilaterally  Abd: soft, non-tender; bowel sounds present  Ext: No edema; warm and well perfused    Telemetry: Sinus     Laboratory Data:                        11.6   7.58  )-----------( 244      ( 15 Marco 2023 06:13 )             36.0     01-15    141  |  105  |  12  ----------------------------<  96  3.5   |  26  |  0.66    Ca    9.1      15 Marco 2023 06:13  Phos  3.7       Mg     2.0         TPro  7.0  /  Alb  3.7  /  TBili  0.2  /  DBili  x   /  AST  29  /  ALT  32  /  AlkPhos  84  01-15              Inpatient Medications:  MEDICATIONS  (STANDING):  amLODIPine   Tablet 10 milliGRAM(s) Oral at bedtime  aspirin enteric coated 81 milliGRAM(s) Oral daily  atorvastatin 80 milliGRAM(s) Oral at bedtime  benztropine 2 milliGRAM(s) Oral every 12 hours  dextrose 5%. 1000 milliLiter(s) (50 mL/Hr) IV Continuous <Continuous>  dextrose 5%. 1000 milliLiter(s) (100 mL/Hr) IV Continuous <Continuous>  dextrose 50% Injectable 25 Gram(s) IV Push once  dextrose 50% Injectable 12.5 Gram(s) IV Push once  dextrose 50% Injectable 25 Gram(s) IV Push once  divalproex ER 1500 milliGRAM(s) Oral at bedtime  enoxaparin Injectable 120 milliGRAM(s) SubCutaneous every 12 hours  escitalopram 10 milliGRAM(s) Oral at bedtime  famotidine    Tablet 20 milliGRAM(s) Oral daily  glucagon  Injectable 1 milliGRAM(s) IntraMuscular once  haloperidol     Tablet 20 milliGRAM(s) Oral at bedtime  insulin glargine Injectable (LANTUS) 44 Unit(s) SubCutaneous at bedtime  insulin lispro (ADMELOG) corrective regimen sliding scale   SubCutaneous three times a day before meals  insulin lispro (ADMELOG) corrective regimen sliding scale   SubCutaneous at bedtime  insulin lispro Injectable (ADMELOG) 8 Unit(s) SubCutaneous three times a day before meals  metoprolol tartrate 25 milliGRAM(s) Oral every 12 hours  QUEtiapine 400 milliGRAM(s) Oral at bedtime  senna 2 Tablet(s) Oral at bedtime  sodium chloride 0.9% lock flush 3 milliLiter(s) IV Push every 8 hours      Assessment:  57M /w Penicillin allergy, PMH  HTN, HLD, DM2, bipolar disorder, SHIN s/p uvula resection not on CPAP,  known CAD with MI with RCA/OM stents  who presented to his Cardiologist complaining of progressive SOB/LEMOS over the last 4 weeks.    Plan of Care:    #Multivessel CAD  - Noted on [facility] 2023  - Pt tentatively scheduled to CABG with Dr. Hou next week  - TTE  shows normal LV systolic function  - Continue ASA, statin and BB    #HTN  - BP Acceptable  - Continue Amlodipine and BB    #HLD  - Statin as ordered    #DM  - ISS   - Defer management to primary team                Over 25 minutes spent on total encounter; more than 50% of the visit was spent counseling and/or coordinating care by the attending physician.      Kevin Landrum,  [facility]  Cardiovascular Disease  [phone]

## 2023-01-15 NOTE — PROGRESS NOTE ADULT - PROBLEM SELECTOR PLAN 1
Patient had cath 1/11 revealing severe TVD EF 65, pLAD 90, mLAD 20, dLAD 80, OM 99, mRCA 95 via RRA access    TX to [facility] for CABG eval with Dr. Hou    Pre-op eval in [facility]  Will obtain pre-op CABG lab work/imaging/diagnostics  Hold P2Y12 inhibitors (on Effient as outpatient per med rec)  most recent P2Y12 on 1/12 was 99 will need repeat before OR   Continue beta blockade with Lopressor 25mg Q12 titrate as BP/HR allows  TTE/Carotid Dopplers/CXR/EKG/UA performed  MRSA PCR negative

## 2023-01-15 NOTE — PROGRESS NOTE ADULT - PROBLEM SELECTOR PLAN 1
Will decrease Lantus to 44 units at bed time.  Will  continue Admelog correction scale coverage.  Patient counseled for compliance with consistent low carb diet.  .

## 2023-01-15 NOTE — PROGRESS NOTE ADULT - ASSESSMENT
57M /w Penicillin allergy, PMH  HTN, HLD, DM2, bipolar disorder, SHIN s/p uvula resection not on CPAP,  known CAD with MI with RCA/OM stents 2013 who presented to his Cardiologist complaining of progressive SOB/LEMOS over the last 4 weeks.  Denies having typical chest pain type symptoms.  Had abnormal stress test and underwent elective cath yesterday on 1/11/23 which revealed EF 65, pLAD 90, mLAD 20, dLAD 80, OM 99, mRCA 95 via RRA access.  Currently patient denies chest pain/sob.  No headache/dizziness.  No abdominal pain/nausea/vomiting.  No bowel/urinary symptoms.  No lower extremity pain/numbness/tingling.  No tooth pain.  Patient states he typically ambulates independently without use of assistive devices and lives with his wife in [place].       Problem/Recommendation - 1:  ·  Problem: CAD, multiple vessel.   ·  Recommendation: CTS help appreciated .   Planning CABG on Tuesday .   ASA ,Lovenox , Statin and BB.     Problem/Recommendation - 2:  ·  Problem: Diabetes.   ·  Recommendation: Uncontrolled as HgbA1C high so endo helping.    Lantus and SSI for now.  Sugars under good control.     Problem/Recommendation - 3:  ·  Problem: Hypertension.   ·  Recommendation: BP readings fine.  BP meds with hold parameters.     Problem/Recommendation - 4:  ·  Problem: Hyperlipidemia.   ·  Recommendation: Statin.     Problem/Recommendation - 5:  ·  Problem: Bipolar disorder.   ·  Recommendation: Home meds.

## 2023-01-15 NOTE — PROGRESS NOTE ADULT - SUBJECTIVE AND OBJECTIVE BOX
Chief complaint    Patient is a 57y old  Male who presents with a chief complaint of CABG eval (14 Jan 2023 17:23)   Review of systems  Patient in bed, appears comfortable.    Labs and Fingersticks  CAPILLARY BLOOD GLUCOSE      POCT Blood Glucose.: 94 mg/dL (15 Marco 2023 07:29)  POCT Blood Glucose.: 148 mg/dL (14 Jan 2023 21:42)  POCT Blood Glucose.: 117 mg/dL (14 Jan 2023 16:29)  POCT Blood Glucose.: 142 mg/dL (14 Jan 2023 11:06)      Anion Gap, Serum: 10 (01-15 @ 06:13)  Anion Gap, Serum: 16 (01-14 @ 06:01)      Calcium, Total Serum: 9.1 (01-15 @ 06:13)  Calcium, Total Serum: 9.4 (01-14 @ 06:01)  Albumin, Serum: 3.7 (01-15 @ 06:13)  Albumin, Serum: 3.7 (01-14 @ 06:01)    Alanine Aminotransferase (ALT/SGPT): 32 (01-15 @ 06:13)  Alanine Aminotransferase (ALT/SGPT): 28 (01-14 @ 06:01)  Alkaline Phosphatase, Serum: 84 (01-15 @ 06:13)  Alkaline Phosphatase, Serum: 89 (01-14 @ 06:01)  Aspartate Aminotransferase (AST/SGOT): 29 (01-15 @ 06:13)  Aspartate Aminotransferase (AST/SGOT): 30 (01-14 @ 06:01)        01-15    141  |  105  |  12  ----------------------------<  96  3.5   |  26  |  0.66    Ca    9.1      15 Marco 2023 06:13  Phos  3.7     01-14  Mg     2.0     01-14    TPro  7.0  /  Alb  3.7  /  TBili  0.2  /  DBili  x   /  AST  29  /  ALT  32  /  AlkPhos  84  01-15                        11.6   7.58  )-----------( 244      ( 15 Marco 2023 06:13 )             36.0     Medications  MEDICATIONS  (STANDING):  amLODIPine   Tablet 10 milliGRAM(s) Oral at bedtime  aspirin enteric coated 81 milliGRAM(s) Oral daily  atorvastatin 80 milliGRAM(s) Oral at bedtime  benztropine 2 milliGRAM(s) Oral every 12 hours  dextrose 5%. 1000 milliLiter(s) (50 mL/Hr) IV Continuous <Continuous>  dextrose 5%. 1000 milliLiter(s) (100 mL/Hr) IV Continuous <Continuous>  dextrose 50% Injectable 25 Gram(s) IV Push once  dextrose 50% Injectable 12.5 Gram(s) IV Push once  dextrose 50% Injectable 25 Gram(s) IV Push once  divalproex ER 1500 milliGRAM(s) Oral at bedtime  enoxaparin Injectable 120 milliGRAM(s) SubCutaneous every 12 hours  escitalopram 10 milliGRAM(s) Oral at bedtime  famotidine    Tablet 20 milliGRAM(s) Oral daily  glucagon  Injectable 1 milliGRAM(s) IntraMuscular once  haloperidol     Tablet 20 milliGRAM(s) Oral at bedtime  insulin glargine Injectable (LANTUS) 44 Unit(s) SubCutaneous at bedtime  insulin lispro (ADMELOG) corrective regimen sliding scale   SubCutaneous three times a day before meals  insulin lispro (ADMELOG) corrective regimen sliding scale   SubCutaneous at bedtime  insulin lispro Injectable (ADMELOG) 8 Unit(s) SubCutaneous three times a day before meals  metoprolol tartrate 25 milliGRAM(s) Oral every 12 hours  QUEtiapine 400 milliGRAM(s) Oral at bedtime  senna 2 Tablet(s) Oral at bedtime  sodium chloride 0.9% lock flush 3 milliLiter(s) IV Push every 8 hours      Physical Exam  General: Patient appears comfortable.  Vital Signs Last 12 Hrs  T(F): 97.5 (01-15-23 @ 04:13), Max: 97.5 (01-15-23 @ 04:13)  HR: 82 (01-15-23 @ 08:38) (74 - 82)  BP: 147/89 (01-15-23 @ 08:38) (147/78 - 147/89)  BP(mean): 108 (01-15-23 @ 08:38) (108 - 108)  RR: 18 (01-15-23 @ 04:13) (18 - 18)  SpO2: 95% (01-15-23 @ 04:13) (95% - 95%)  Neck: No palpable thyroid nodules.  CVS: S1S2, No murmurs  Respiratory: No wheezing, no crepitations  GI: Abdomen soft, non tender.  Musculoskeletal:  edema lower extremities.     Diagnostics    Free Thyroxine, Serum: AM Sched. Collection: 15-Marco-2023 06:00 (01-14 @ 14:18)  Thyroid Stimulating Hormone, Serum: AM Sched. Collection: 15-Marco-2023 06:00 (01-14 @ 14:18)

## 2023-01-15 NOTE — PROGRESS NOTE ADULT - ASSESSMENT
Patient is a 57M /w Penicillin allergy, PMH  HTN, HLD, DM2, bipolar disorder, SHIN s/p uvula resection not on CPAP,  known CAD with MI with RCA/OM stents 2013 who presented to his Cardiologist complaining of progressive SOB/LEMOS over the last 4 weeks.  Denies having typical chest pain type symptoms.  Had abnormal stress test and underwent elective cath yesterday on 1/11/23 which revealed EF 65, pLAD 90, mLAD 20, dLAD 80, OM 99, mRCA 95 via RRA access.  Now transferred to [facility] for CABG eval /w Dr. Barrientos.  Currently patient denies chest pain/sob.  No headache/dizziness.  No abdominal pain/nausea/vomiting.  No bowel/urinary symptoms.  No lower extremity pain/numbness/tingling.  No tooth pain.  Patient states he typically ambulates independently without use of assistive devices and lives with his wife in [place].      1/12/2023. Patient transferred to [facility] for CABG evaluation /w Dr. Barrientos to review cath images/TTE.    1/13 VSS overnight.  TTE done showing normal LV/RV function. Carotids without significant stenosis.  UA negative.  Started on weight based Lovenox 120mg BID   1/14 VSS TSH elevated 6.69 Dr Sequeira endocrine consulted CP free P2y12 99 repeat in am   1/15 Replete K. Continue with BBlocker, statin asa. P@ Y!@ pending Plan for CABG Tuesday   Patient is a 57M /w Penicillin allergy, PMH  HTN, HLD, DM2, bipolar disorder, SHIN s/p uvula resection not on CPAP,  known CAD with MI with RCA/OM stents 2013 who presented to his Cardiologist complaining of progressive SOB/LEMOS over the last 4 weeks.  Denies having typical chest pain type symptoms.  Had abnormal stress test and underwent elective cath yesterday on 1/11/23 which revealed EF 65, pLAD 90, mLAD 20, dLAD 80, OM 99, mRCA 95 via RRA access.  Now transferred to [facility] for CABG eval /w Dr. Barrientos.  Currently patient denies chest pain/sob.  No headache/dizziness.  No abdominal pain/nausea/vomiting.  No bowel/urinary symptoms.  No lower extremity pain/numbness/tingling.  No tooth pain.  Patient states he typically ambulates independently without use of assistive devices and lives with his wife in [place].      1/12/2023. Patient transferred to [facility] for CABG evaluation /w Dr. Barrientos to review cath images/TTE.    1/13 VSS overnight.  TTE done showing normal LV/RV function. Carotids without significant stenosis.  UA negative.  Started on weight based Lovenox 120mg BID   1/14 VSS TSH elevated 6.69 Dr Sequeira endocrine consulted CP free P2y12 99 repeat in am   1/15 Replete K. Continue with BBlocker, statin asa. P2Y12 pending Plan for CABG Tuesday

## 2023-01-15 NOTE — PROGRESS NOTE ADULT - PROBLEM SELECTOR PLAN 5
Continue home regimen  Dr Macario patient outpatient    Depakote 1500mg HS  Haldol 20mg HS  Seroquel 400mg HS  Cogentin 2mg Q12
Continue home regimen    Depakote 1500mg HS  Haldol 20mg HS  Seroquel 400mg HS  Cogentin 2mg Q12
Continue home regimen  Dr Macario patient outpatient    Depakote 1500mg HS  Haldol 20mg HS  Seroquel 400mg HS  Cogentin 2mg Q12

## 2023-01-16 ENCOUNTER — TRANSCRIPTION ENCOUNTER (OUTPATIENT)
Age: 58
End: 2023-01-16

## 2023-01-16 LAB
ALBUMIN SERPL ELPH-MCNC: 3.8 G/DL — SIGNIFICANT CHANGE UP (ref 3.3–5)
ALP SERPL-CCNC: 80 U/L — SIGNIFICANT CHANGE UP (ref 40–120)
ALT FLD-CCNC: 34 U/L — SIGNIFICANT CHANGE UP (ref 10–45)
ANION GAP SERPL CALC-SCNC: 11 MMOL/L — SIGNIFICANT CHANGE UP (ref 5–17)
APTT BLD: 36.6 SEC — HIGH (ref 27.5–35.5)
AST SERPL-CCNC: 26 U/L — SIGNIFICANT CHANGE UP (ref 10–40)
BILIRUB SERPL-MCNC: 0.2 MG/DL — SIGNIFICANT CHANGE UP (ref 0.2–1.2)
BLD GP AB SCN SERPL QL: NEGATIVE — SIGNIFICANT CHANGE UP
BUN SERPL-MCNC: 14 MG/DL — SIGNIFICANT CHANGE UP (ref 7–23)
CALCIUM SERPL-MCNC: 9.5 MG/DL — SIGNIFICANT CHANGE UP (ref 8.4–10.5)
CHLORIDE SERPL-SCNC: 104 MMOL/L — SIGNIFICANT CHANGE UP (ref 96–108)
CO2 SERPL-SCNC: 26 MMOL/L — SIGNIFICANT CHANGE UP (ref 22–31)
CREAT SERPL-MCNC: 0.69 MG/DL — SIGNIFICANT CHANGE UP (ref 0.5–1.3)
EGFR: 108 ML/MIN/1.73M2 — SIGNIFICANT CHANGE UP
FIBRINOGEN PPP-MCNC: 333 MG/DL — SIGNIFICANT CHANGE UP (ref 200–445)
GLUCOSE BLDC GLUCOMTR-MCNC: 120 MG/DL — HIGH (ref 70–99)
GLUCOSE BLDC GLUCOMTR-MCNC: 155 MG/DL — HIGH (ref 70–99)
GLUCOSE BLDC GLUCOMTR-MCNC: 161 MG/DL — HIGH (ref 70–99)
GLUCOSE BLDC GLUCOMTR-MCNC: 185 MG/DL — HIGH (ref 70–99)
GLUCOSE SERPL-MCNC: 143 MG/DL — HIGH (ref 70–99)
HCT VFR BLD CALC: 35.2 % — LOW (ref 39–50)
HGB BLD-MCNC: 11.4 G/DL — LOW (ref 13–17)
INR BLD: 1.11 RATIO — SIGNIFICANT CHANGE UP (ref 0.88–1.16)
MCHC RBC-ENTMCNC: 28 PG — SIGNIFICANT CHANGE UP (ref 27–34)
MCHC RBC-ENTMCNC: 32.4 GM/DL — SIGNIFICANT CHANGE UP (ref 32–36)
MCV RBC AUTO: 86.5 FL — SIGNIFICANT CHANGE UP (ref 80–100)
NRBC # BLD: 0 /100 WBCS — SIGNIFICANT CHANGE UP (ref 0–0)
PA ADP PRP-ACNC: 197 PRU — SIGNIFICANT CHANGE UP (ref 194–417)
PLATELET # BLD AUTO: 238 K/UL — SIGNIFICANT CHANGE UP (ref 150–400)
POTASSIUM SERPL-MCNC: 3.9 MMOL/L — SIGNIFICANT CHANGE UP (ref 3.5–5.3)
POTASSIUM SERPL-SCNC: 3.9 MMOL/L — SIGNIFICANT CHANGE UP (ref 3.5–5.3)
PROT SERPL-MCNC: 7 G/DL — SIGNIFICANT CHANGE UP (ref 6–8.3)
PROTHROM AB SERPL-ACNC: 12.9 SEC — SIGNIFICANT CHANGE UP (ref 10.5–13.4)
RBC # BLD: 4.07 M/UL — LOW (ref 4.2–5.8)
RBC # FLD: 14.8 % — HIGH (ref 10.3–14.5)
RH IG SCN BLD-IMP: POSITIVE — SIGNIFICANT CHANGE UP
SARS-COV-2 RNA SPEC QL NAA+PROBE: SIGNIFICANT CHANGE UP
SODIUM SERPL-SCNC: 141 MMOL/L — SIGNIFICANT CHANGE UP (ref 135–145)
WBC # BLD: 8.1 K/UL — SIGNIFICANT CHANGE UP (ref 3.8–10.5)
WBC # FLD AUTO: 8.1 K/UL — SIGNIFICANT CHANGE UP (ref 3.8–10.5)

## 2023-01-16 PROCEDURE — 99232 SBSQ HOSP IP/OBS MODERATE 35: CPT | Mod: FS

## 2023-01-16 RX ORDER — CHLORHEXIDINE GLUCONATE 213 G/1000ML
1 SOLUTION TOPICAL ONCE
Refills: 0 | Status: COMPLETED | OUTPATIENT
Start: 2023-01-16 | End: 2023-01-16

## 2023-01-16 RX ORDER — ASCORBIC ACID 60 MG
2000 TABLET,CHEWABLE ORAL ONCE
Refills: 0 | Status: COMPLETED | OUTPATIENT
Start: 2023-01-16 | End: 2023-01-16

## 2023-01-16 RX ORDER — CHLORHEXIDINE GLUCONATE 213 G/1000ML
30 SOLUTION TOPICAL ONCE
Refills: 0 | Status: COMPLETED | OUTPATIENT
Start: 2023-01-16 | End: 2023-01-17

## 2023-01-16 RX ORDER — VANCOMYCIN HCL 1 G
1750 VIAL (EA) INTRAVENOUS ONCE
Refills: 0 | Status: DISCONTINUED | OUTPATIENT
Start: 2023-01-16 | End: 2023-01-17

## 2023-01-16 RX ORDER — GABAPENTIN 400 MG/1
300 CAPSULE ORAL ONCE
Refills: 0 | Status: COMPLETED | OUTPATIENT
Start: 2023-01-16 | End: 2023-01-17

## 2023-01-16 RX ORDER — MUPIROCIN 20 MG/G
1 OINTMENT TOPICAL
Refills: 0 | Status: DISCONTINUED | OUTPATIENT
Start: 2023-01-16 | End: 2023-01-16

## 2023-01-16 RX ORDER — CHLORHEXIDINE GLUCONATE 213 G/1000ML
1 SOLUTION TOPICAL ONCE
Refills: 0 | Status: COMPLETED | OUTPATIENT
Start: 2023-01-17 | End: 2023-01-17

## 2023-01-16 RX ORDER — ACETAMINOPHEN 500 MG
1000 TABLET ORAL ONCE
Refills: 0 | Status: COMPLETED | OUTPATIENT
Start: 2023-01-16 | End: 2023-01-17

## 2023-01-16 RX ADMIN — QUETIAPINE FUMARATE 400 MILLIGRAM(S): 200 TABLET, FILM COATED ORAL at 21:25

## 2023-01-16 RX ADMIN — Medication 2000 MILLIGRAM(S): at 21:29

## 2023-01-16 RX ADMIN — Medication 81 MILLIGRAM(S): at 08:24

## 2023-01-16 RX ADMIN — Medication 1: at 16:43

## 2023-01-16 RX ADMIN — Medication 2 MILLIGRAM(S): at 21:23

## 2023-01-16 RX ADMIN — ATORVASTATIN CALCIUM 80 MILLIGRAM(S): 80 TABLET, FILM COATED ORAL at 21:23

## 2023-01-16 RX ADMIN — SODIUM CHLORIDE 3 MILLILITER(S): 9 INJECTION INTRAMUSCULAR; INTRAVENOUS; SUBCUTANEOUS at 15:09

## 2023-01-16 RX ADMIN — Medication 3 MILLIGRAM(S): at 21:39

## 2023-01-16 RX ADMIN — Medication 8 UNIT(S): at 16:43

## 2023-01-16 RX ADMIN — SENNA PLUS 2 TABLET(S): 8.6 TABLET ORAL at 21:25

## 2023-01-16 RX ADMIN — SODIUM CHLORIDE 3 MILLILITER(S): 9 INJECTION INTRAMUSCULAR; INTRAVENOUS; SUBCUTANEOUS at 21:26

## 2023-01-16 RX ADMIN — ESCITALOPRAM OXALATE 10 MILLIGRAM(S): 10 TABLET, FILM COATED ORAL at 21:24

## 2023-01-16 RX ADMIN — ENOXAPARIN SODIUM 120 MILLIGRAM(S): 100 INJECTION SUBCUTANEOUS at 05:28

## 2023-01-16 RX ADMIN — ZOLPIDEM TARTRATE 5 MILLIGRAM(S): 10 TABLET ORAL at 21:38

## 2023-01-16 RX ADMIN — Medication 1: at 11:57

## 2023-01-16 RX ADMIN — INSULIN GLARGINE 44 UNIT(S): 100 INJECTION, SOLUTION SUBCUTANEOUS at 21:39

## 2023-01-16 RX ADMIN — Medication 8 UNIT(S): at 07:55

## 2023-01-16 RX ADMIN — Medication 25 MILLIGRAM(S): at 07:55

## 2023-01-16 RX ADMIN — AMLODIPINE BESYLATE 10 MILLIGRAM(S): 2.5 TABLET ORAL at 21:23

## 2023-01-16 RX ADMIN — Medication 25 MILLIGRAM(S): at 21:25

## 2023-01-16 RX ADMIN — SODIUM CHLORIDE 3 MILLILITER(S): 9 INJECTION INTRAMUSCULAR; INTRAVENOUS; SUBCUTANEOUS at 05:32

## 2023-01-16 RX ADMIN — Medication 8 UNIT(S): at 11:57

## 2023-01-16 RX ADMIN — CHLORHEXIDINE GLUCONATE 1 APPLICATION(S): 213 SOLUTION TOPICAL at 19:34

## 2023-01-16 RX ADMIN — Medication 2 MILLIGRAM(S): at 07:55

## 2023-01-16 RX ADMIN — FAMOTIDINE 20 MILLIGRAM(S): 10 INJECTION INTRAVENOUS at 08:24

## 2023-01-16 RX ADMIN — DIVALPROEX SODIUM 1500 MILLIGRAM(S): 500 TABLET, DELAYED RELEASE ORAL at 21:24

## 2023-01-16 RX ADMIN — HALOPERIDOL DECANOATE 20 MILLIGRAM(S): 100 INJECTION INTRAMUSCULAR at 21:24

## 2023-01-16 NOTE — PROGRESS NOTE ADULT - PROBLEM SELECTOR PLAN 1
Continue asa 81 daily, metoprool 25 BID and atorvastatin 80 HS  Check PFTs  Effient on hold, P2y12 on 1/16 -197  Titrate up beta-blocker as tolerated  Keep NPO p MN, preop chlorhexidine shower  Plan for CABG in AM 1/17 with Dr. Hou

## 2023-01-16 NOTE — PROGRESS NOTE ADULT - SUBJECTIVE AND OBJECTIVE BOX
Cardiovascular Disease Progress Note  Date of service: 23 @ 10:18    Overnight events: No acute events overnight.  Pt denies chest pain or SOB.   Otherwise review of systems negative    Objective Findings:  T(C): 36.4 (23 @ 04:13), Max: 36.7 (01-15-23 @ 19:17)  HR: 71 (23 @ 04:13) (71 - 80)  BP: 148/86 (23 @ 04:13) (130/84 - 148/86)  RR: 18 (23 @ 04:13) (18 - 18)  SpO2: 95% (23 @ 04:13) (93% - 95%)  Wt(kg): --  Daily     Daily Weight in k.4 (2023 08:40)      Physical Exam:  Gen: NAD; Patient resting comfortably  HEENT: EOMI, Normocephalic/ atraumatic  CV: RRR, normal S1 + S2, no m/r/g  Lungs:  Normal respiratory effort; clear to auscultation bilaterally  Abd: soft, non-tender; bowel sounds present  Ext: No edema; warm and well perfused    Telemetry:  Sinus     Laboratory Data:                        11.4   8.10  )-----------( 238      ( 2023 04:47 )             35.2         141  |  104  |  14  ----------------------------<  143<H>  3.9   |  26  |  0.69    Ca    9.5      2023 04:47    TPro  7.0  /  Alb  3.8  /  TBili  0.2  /  DBili  x   /  AST  26  /  ALT  34  /  AlkPhos  80  -    PT/INR - ( 2023 04:47 )   PT: 12.9 sec;   INR: 1.11 ratio         PTT - ( 2023 04:47 )  PTT:36.6 sec          Inpatient Medications:  MEDICATIONS  (STANDING):  acetaminophen     Tablet .. 1000 milliGRAM(s) Oral once  amLODIPine   Tablet 10 milliGRAM(s) Oral at bedtime  ascorbic acid 2000 milliGRAM(s) Oral once  aspirin enteric coated 81 milliGRAM(s) Oral daily  atorvastatin 80 milliGRAM(s) Oral at bedtime  benztropine 2 milliGRAM(s) Oral every 12 hours  chlorhexidine 0.12% Liquid 30 milliLiter(s) Swish and Spit once  chlorhexidine 4% Liquid 1 Application(s) Topical once  dextrose 5%. 1000 milliLiter(s) (50 mL/Hr) IV Continuous <Continuous>  dextrose 5%. 1000 milliLiter(s) (100 mL/Hr) IV Continuous <Continuous>  dextrose 50% Injectable 25 Gram(s) IV Push once  dextrose 50% Injectable 12.5 Gram(s) IV Push once  dextrose 50% Injectable 25 Gram(s) IV Push once  divalproex ER 1500 milliGRAM(s) Oral at bedtime  escitalopram 10 milliGRAM(s) Oral at bedtime  famotidine    Tablet 20 milliGRAM(s) Oral daily  gabapentin 300 milliGRAM(s) Oral once  glucagon  Injectable 1 milliGRAM(s) IntraMuscular once  haloperidol     Tablet 20 milliGRAM(s) Oral at bedtime  insulin glargine Injectable (LANTUS) 44 Unit(s) SubCutaneous at bedtime  insulin lispro (ADMELOG) corrective regimen sliding scale   SubCutaneous three times a day before meals  insulin lispro (ADMELOG) corrective regimen sliding scale   SubCutaneous at bedtime  insulin lispro Injectable (ADMELOG) 8 Unit(s) SubCutaneous three times a day before meals  metoprolol tartrate 25 milliGRAM(s) Oral every 12 hours  QUEtiapine 400 milliGRAM(s) Oral at bedtime  senna 2 Tablet(s) Oral at bedtime  sodium chloride 0.9% lock flush 3 milliLiter(s) IV Push every 8 hours  vancomycin  IVPB 1750 milliGRAM(s) IV Intermittent once      Assessment:  57M /w Penicillin allergy, PMH  HTN, HLD, DM2, bipolar disorder, SHIN s/p uvula resection not on CPAP,  known CAD with MI with RCA/OM stents  who presented to his Cardiologist complaining of progressive SOB/LEMOS over the last 4 weeks.    Plan of Care:    #Multivessel CAD  - Noted on [facility] 2023  - Pt to undergo CABG  with Dr Hou  - TTE  shows normal LV systolic function  - Continue ASA, statin and BB    #HTN  - BP Acceptable  - Continue Amlodipine and BB    #HLD  - Statin as ordered    #DM  - ISS   - Defer management to primary team        #ACP (advance care planning)-  Advanced care planning was discussed with the patient.  Risks, benefits and alternatives of medical treatment and procedures were discussed in detail and all questions were answered. 30 additional minutes spent addressing advance care plans.      Over 25 minutes spent on total encounter; more than 50% of the visit was spent counseling and/or coordinating care by the attending physician.      Kevin Landrum DO [facility]  Cardiovascular Disease  [phone]

## 2023-01-16 NOTE — PROGRESS NOTE ADULT - SUBJECTIVE AND OBJECTIVE BOX
Chief complaint    Patient is a 57y old  Male who presents with a chief complaint of CABG eval (16 Jan 2023 10:18)   Review of systems  Patient in bed, appears comfortable.    Labs and Fingersticks  CAPILLARY BLOOD GLUCOSE      POCT Blood Glucose.: 161 mg/dL (16 Jan 2023 16:23)  POCT Blood Glucose.: 185 mg/dL (16 Jan 2023 11:15)  POCT Blood Glucose.: 120 mg/dL (16 Jan 2023 07:34)  POCT Blood Glucose.: 151 mg/dL (15 Marco 2023 21:23)      Anion Gap, Serum: 11 (01-16 @ 04:47)  Anion Gap, Serum: 10 (01-15 @ 06:13)      Calcium, Total Serum: 9.5 (01-16 @ 04:47)  Calcium, Total Serum: 9.1 (01-15 @ 06:13)  Albumin, Serum: 3.8 (01-16 @ 04:47)  Albumin, Serum: 3.7 (01-15 @ 06:13)    Alanine Aminotransferase (ALT/SGPT): 34 (01-16 @ 04:47)  Alanine Aminotransferase (ALT/SGPT): 32 (01-15 @ 06:13)  Alkaline Phosphatase, Serum: 80 (01-16 @ 04:47)  Alkaline Phosphatase, Serum: 84 (01-15 @ 06:13)  Aspartate Aminotransferase (AST/SGOT): 26 (01-16 @ 04:47)  Aspartate Aminotransferase (AST/SGOT): 29 (01-15 @ 06:13)        01-16    141  |  104  |  14  ----------------------------<  143<H>  3.9   |  26  |  0.69    Ca    9.5      16 Jan 2023 04:47    TPro  7.0  /  Alb  3.8  /  TBili  0.2  /  DBili  x   /  AST  26  /  ALT  34  /  AlkPhos  80  01-16                        11.4   8.10  )-----------( 238      ( 16 Jan 2023 04:47 )             35.2     Medications  MEDICATIONS  (STANDING):  acetaminophen     Tablet .. 1000 milliGRAM(s) Oral once  amLODIPine   Tablet 10 milliGRAM(s) Oral at bedtime  ascorbic acid 2000 milliGRAM(s) Oral once  aspirin enteric coated 81 milliGRAM(s) Oral daily  atorvastatin 80 milliGRAM(s) Oral at bedtime  benztropine 2 milliGRAM(s) Oral every 12 hours  chlorhexidine 0.12% Liquid 30 milliLiter(s) Swish and Spit once  chlorhexidine 4% Liquid 1 Application(s) Topical once  dextrose 5%. 1000 milliLiter(s) (50 mL/Hr) IV Continuous <Continuous>  dextrose 5%. 1000 milliLiter(s) (100 mL/Hr) IV Continuous <Continuous>  dextrose 50% Injectable 25 Gram(s) IV Push once  dextrose 50% Injectable 25 Gram(s) IV Push once  dextrose 50% Injectable 12.5 Gram(s) IV Push once  divalproex ER 1500 milliGRAM(s) Oral at bedtime  escitalopram 10 milliGRAM(s) Oral at bedtime  famotidine    Tablet 20 milliGRAM(s) Oral daily  gabapentin 300 milliGRAM(s) Oral once  glucagon  Injectable 1 milliGRAM(s) IntraMuscular once  haloperidol     Tablet 20 milliGRAM(s) Oral at bedtime  insulin glargine Injectable (LANTUS) 44 Unit(s) SubCutaneous at bedtime  insulin lispro (ADMELOG) corrective regimen sliding scale   SubCutaneous three times a day before meals  insulin lispro (ADMELOG) corrective regimen sliding scale   SubCutaneous at bedtime  insulin lispro Injectable (ADMELOG) 8 Unit(s) SubCutaneous three times a day before meals  metoprolol tartrate 25 milliGRAM(s) Oral every 12 hours  QUEtiapine 400 milliGRAM(s) Oral at bedtime  senna 2 Tablet(s) Oral at bedtime  sodium chloride 0.9% lock flush 3 milliLiter(s) IV Push every 8 hours  vancomycin  IVPB 1750 milliGRAM(s) IV Intermittent once      Physical Exam  General: Patient appears comfortable.  Vital Signs Last 12 Hrs  T(F): 97.3 (01-16-23 @ 12:27), Max: 97.3 (01-16-23 @ 12:27)  HR: 75 (01-16-23 @ 12:27) (75 - 75)  BP: 171/87 (01-16-23 @ 12:27) (171/87 - 171/87)  BP(mean): --  RR: 18 (01-16-23 @ 12:27) (18 - 18)  SpO2: 95% (01-16-23 @ 12:27) (95% - 95%)  Neck: No palpable thyroid nodules.  CVS: S1S2, No murmurs  Respiratory: No wheezing, no crepitations  GI: Abdomen soft, non tender.  Musculoskeletal:  edema lower extremities.     Diagnostics    Free Thyroxine, Serum: AM Sched. Collection: 15-Marco-2023 06:00 (01-14 @ 14:18)  Thyroid Stimulating Hormone, Serum: AM Sched. Collection: 15-Marco-2023 06:00 (01-14 @ 14:18)

## 2023-01-16 NOTE — PROGRESS NOTE ADULT - SUBJECTIVE AND OBJECTIVE BOX
Cardiac Surgery Pre-op Note:     Surgeon: Dr. Hou    Procedure: 1/17/23 CABG x3    HPI:  Patient is a 57M /w Penicillin allergy, PMH  HTN, HLD, DM2, bipolar disorder, SHIN s/p uvula resection not on CPAP,  known CAD with MI with RCA/OM stents 2013 who presented to his Cardiologist complaining of progressive SOB/LEMOS over the last 4 weeks.  Denies having typical chest pain type symptoms.  Had abnormal stress test and underwent elective cath yesterday on 1/11/23 which revealed EF 65, pLAD 90, mLAD 20, dLAD 80, OM 99, mRCA 95 via RRA access.  Now transferred to [facility] for CABG eval /w Dr. Barrientos.  Currently patient denies chest pain/sob.  No headache/dizziness.  No abdominal pain/nausea/vomiting.  No bowel/urinary symptoms.  No lower extremity pain/numbness/tingling.  No tooth pain.  Patient states he typically ambulates independently without use of assistive devices and lives with his wife in [place].     (12 Jan 2023 07:07)      PAST MEDICAL & SURGICAL HISTORY:  Diabetes      HTN (Hypertension)      Hyperlipidemia      Bipolar Disorder      History of Seizure Disorder  patient denies      MI, old      Stented coronary artery  RCA      Sleep apnea  cannot tolerate CPAP      Triple vessel coronary artery disease      SHIN (obstructive sleep apnea)  s/p surgery (uvula ressection), cannot tolerate CPAP          MEDICATIONS  (STANDING):  acetaminophen     Tablet .. 1000 milliGRAM(s) Oral once  amLODIPine   Tablet 10 milliGRAM(s) Oral at bedtime  ascorbic acid 2000 milliGRAM(s) Oral once  aspirin enteric coated 81 milliGRAM(s) Oral daily  atorvastatin 80 milliGRAM(s) Oral at bedtime  benztropine 2 milliGRAM(s) Oral every 12 hours  chlorhexidine 0.12% Liquid 30 milliLiter(s) Swish and Spit once  chlorhexidine 4% Liquid 1 Application(s) Topical once  divalproex ER 1500 milliGRAM(s) Oral at bedtime  escitalopram 10 milliGRAM(s) Oral at bedtime  famotidine    Tablet 20 milliGRAM(s) Oral daily  gabapentin 300 milliGRAM(s) Oral once  glucagon  Injectable 1 milliGRAM(s) IntraMuscular once  haloperidol     Tablet 20 milliGRAM(s) Oral at bedtime  insulin glargine Injectable (LANTUS) 44 Unit(s) SubCutaneous at bedtime  insulin lispro (ADMELOG) corrective regimen sliding scale   SubCutaneous three times a day before meals  insulin lispro (ADMELOG) corrective regimen sliding scale   SubCutaneous at bedtime  insulin lispro Injectable (ADMELOG) 8 Unit(s) SubCutaneous three times a day before meals  metoprolol tartrate 25 milliGRAM(s) Oral every 12 hours  QUEtiapine 400 milliGRAM(s) Oral at bedtime  senna 2 Tablet(s) Oral at bedtime  sodium chloride 0.9% lock flush 3 milliLiter(s) IV Push every 8 hours  vancomycin  IVPB 1750 milliGRAM(s) IV Intermittent once    MEDICATIONS  (PRN):  dextrose Oral Gel 15 Gram(s) Oral once PRN Blood Glucose LESS THAN 70 milliGRAM(s)/deciliter  melatonin 3 milliGRAM(s) Oral at bedtime PRN Sleep  zolpidem 5 milliGRAM(s) Oral at bedtime PRN Insomnia      Vital Signs Last 24 Hrs  T(C): 36.4 (01-16-23 @ 04:13), Max: 36.7 (01-15-23 @ 19:17)  T(F): 97.6 (01-16-23 @ 04:13), Max: 98.1 (01-15-23 @ 19:17)  HR: 71 (01-16-23 @ 04:13) (71 - 80)  BP: 148/86 (01-16-23 @ 04:13) (130/84 - 148/86)  RR: 18 (01-16-23 @ 04:13) (18 - 18)  SpO2: 95% (01-16-23 @ 04:13) (93% - 95%)             Height (cm): 172.7   Weight (kg): 115.6    BMI (kg/m2): 38.8     (12 Jan 2023 05:45)    Labs:                        11.4   8.10  )-----------( 238      ( 16 Jan 2023 04:47 )             35.2     141  |  104  |  14  ----------------------------<  143<H>  3.9   |  26  |  0.69    AST  26  /  ALT  34  /  AlkPhos  80  01-16    PT: 12.9 sec;   INR: 1.11 ratio  PTT:36.6 sec    ABO Interpretation: A (01-16 @ 04:43)      Serum Pro-Brain Natriuretic Peptide: 52 pg/mL (01-12 @ 07:10)    Thyroid Panel: 01-15 @ 06:13     2.94/1.1    MRSA/ MSSA PCR Result.: NotDetec (01-12 @ 07:06)    P2Y12: 197    PFT's: pending    < from: Xray Chest 1 View AP/PA (01.12.23 @ 10:30) >    IMPRESSION:  Low lung volumes. Clear lungs.      < from: Transthoracic Echocardiogram (01.12.23 @ 06:16) >  Observations:  Mitral Valve: Normal mitral valve.  Aortic Valve/Aorta: Calcified trileaflet aortic valve with  normal opening.  Aortic Root: 3.3 cm.  Left Atrium: Normal left atrium.  Left Ventricle: Normal left ventricular systolic function.  No segmental wall motion abnormalities. Increased relative  wall thickness with normal left ventricular mass index,  consistent with concentric left ventricular remodeling.  Normal diastolic function.  Right Heart: Normal right atrium. Normal right ventricular  size and function. Tricuspid valve not well visualized.  Pulmonic valve not well visualized.  Pericardium/Pleura: Normal pericardium with no pericardial  effusion.  Hemodynamic: Estimated right atrial pressure is 8 mm Hg.    < from: Cardiac Catheterization (01.11.23 @ 14:22) >  Diagnostic Findings:     Coronary Angiography   The coronary circulation is co-dominant.      LM   Left main artery: Angiography shows mild atherosclerosis. There is a  20 % stenosis. SMOOTH Flow 3.    LAD   Left anterior descending artery: Angiography shows severe  atherosclerosis. Thereis a 90 % stenosis. SMOOTH Flow 3.    CX   Circumflex: Angiography shows severe atherosclerosis. There is a 99 %  stenosis. SMOOTH Flow 3.    RCA   Right coronary artery: Angiography shows severe atherosclerosis. There  is a 95 % stenosis. SMOOTH Flow 3.      < from: VA Duplex Carotid, Bilat (01.12.23 @ 14:08) >  IMPRESSION: Bilateral calcified plaque. No significant hemodynamic   stenosis of either carotid artery.        PHYSICAL EXAM:  Neuro: A&Ox3, NAD  Pulm: B/L BS CTA  CV: RRR,+S1S2  Abd: Soft, NT/ND +BSX4Q  Ext: B/L LE no edema, +PP, no calf tenderness      Pt has AICD/PPM [ ] Yes  [x ] No              Pre-op Beta Blocker ordered within 24 hrs of surgery (CABG ONLY)?  [x ] Yes  [ ] No  If not, Why?  Type & Cross  [X] Yes  [ ] No  NPO after Midnight [x ] Yes  [ ] No  Pre-op ABX ordered, to be taped on chart:  [ x] Yes  [ ] No     Hibiclens/Peridex ordered [x ] Yes  [ ] No  Intraop on Hold: PRBCs, CXR, CARMENZA [x ]   Consent obtained  [x ] Yes  [ ] No

## 2023-01-16 NOTE — PROGRESS NOTE ADULT - ASSESSMENT
Assessment  DMT2: 57y Male with DM T2 with hyperglycemia admitted with chest pain  A1C is 8.1%, patient was on insulin at home, now on basal bolus and insulin coverage, blood sugars improving, no hypoglycemic episode.  Patient is high risk with high level decision making due to uncontrolled diabetes, has increased risk for complications. Tight sugar control is not recommended for this patient.  CAD: CABG, on medications, monitored, asymptomatic.  HTN: On antihypertensive medications, monitored, asymptomatic.                Guerrero Sequeira MD  Cell:  [phone]  Office: [phone]

## 2023-01-16 NOTE — PROGRESS NOTE ADULT - ASSESSMENT
Patient is a 57M /w Penicillin allergy, PMH  HTN, HLD, DM2, bipolar disorder, SHIN s/p uvula resection not on CPAP,  known CAD with MI with RCA/OM stents 2013 who presented to his Cardiologist complaining of progressive SOB/LEMOS over the last 4 weeks.  Denies having typical chest pain type symptoms.  Had abnormal stress test and underwent elective cath yesterday on 1/11/23 which revealed EF 65, pLAD 90, mLAD 20, dLAD 80, OM 99, mRCA 95 via RRA access.  Now transferred to [facility] for CABG eval /w Dr. Barrientos.  Currently patient denies chest pain/sob.  No headache/dizziness.  No abdominal pain/nausea/vomiting.  No bowel/urinary symptoms.  No lower extremity pain/numbness/tingling.  No tooth pain.  Patient states he typically ambulates independently without use of assistive devices and lives with his wife in [place].      1/12/2023. Patient transferred to [facility] for CABG evaluation /w Dr. Barrientos to review cath images/TTE.    1/13 VSS overnight.  TTE done showing normal LV/RV function. Carotids without significant stenosis.  UA negative.  Started on weight based Lovenox 120mg BID   1/14 VSS TSH elevated 6.69 Dr Sequeira endocrine consulted CP free P2y12 99 repeat in am   1/15 Replete K. Continue with BBlocker, statin asa. P2Y12 pending Plan for CABG Tuesday 1/16 VSS, last dose Lovenox this AM, plan for OR in AM for CABG. P2y12 197.

## 2023-01-16 NOTE — PROGRESS NOTE ADULT - ASSESSMENT
57M /w Penicillin allergy, PMH  HTN, HLD, DM2, bipolar disorder, SHIN s/p uvula resection not on CPAP,  known CAD with MI with RCA/OM stents 2013 who presented to his Cardiologist complaining of progressive SOB/LEMOS over the last 4 weeks.  Denies having typical chest pain type symptoms.  Had abnormal stress test and underwent elective cath yesterday on 1/11/23 which revealed EF 65, pLAD 90, mLAD 20, dLAD 80, OM 99, mRCA 95 via RRA access.  Currently patient denies chest pain/sob.  No headache/dizziness.  No abdominal pain/nausea/vomiting.  No bowel/urinary symptoms.  No lower extremity pain/numbness/tingling.  No tooth pain.  Patient states he typically ambulates independently without use of assistive devices and lives with his wife in [place].       Problem/Recommendation - 1:  ·  Problem: CAD, multiple vessel.   ·  Recommendation: CTS help appreciated .   Planning CABG on Tuesday .   ASA ,Lovenox , Statin and BB.     Problem/Recommendation - 2:  ·  Problem: Diabetes.   ·  Recommendation: Uncontrolled as HgbA1C high so endo helping.    Lantus and SSI for now.  Sugars under good control.     Problem/Recommendation - 3:  ·  Problem: Hypertension.   ·  Recommendation: BP readings high so will watch closely as has been fine.   BP meds with hold parameters.     Problem/Recommendation - 4:  ·  Problem: Hyperlipidemia.   ·  Recommendation: Statin.     Problem/Recommendation - 5:  ·  Problem: Bipolar disorder.   ·  Recommendation: Home meds.

## 2023-01-16 NOTE — PROGRESS NOTE ADULT - SUBJECTIVE AND OBJECTIVE BOX
Date of Service  : 01-16-23     INTERVAL HPI/OVERNIGHT EVENTS: Seen and examined with daughter in room.   Vital Signs Last 24 Hrs  T(C): 36.3 (16 Jan 2023 12:27), Max: 36.7 (15 Marco 2023 19:17)  T(F): 97.3 (16 Jan 2023 12:27), Max: 98.1 (15 Marco 2023 19:17)  HR: 75 (16 Jan 2023 12:27) (71 - 80)  BP: 171/87 (16 Jan 2023 12:27) (137/82 - 171/87)  BP(mean): --  RR: 18 (16 Jan 2023 12:27) (18 - 18)  SpO2: 95% (16 Jan 2023 12:27) (94% - 95%)    Parameters below as of 16 Jan 2023 12:27  Patient On (Oxygen Delivery Method): room air      I&O's Summary    15 Marco 2023 07:01  -  16 Jan 2023 07:00  --------------------------------------------------------  IN: 1600 mL / OUT: 1475 mL / NET: 125 mL    16 Jan 2023 07:01  -  16 Jan 2023 18:15  --------------------------------------------------------  IN: 720 mL / OUT: 0 mL / NET: 720 mL      MEDICATIONS  (STANDING):  acetaminophen     Tablet .. 1000 milliGRAM(s) Oral once  amLODIPine   Tablet 10 milliGRAM(s) Oral at bedtime  ascorbic acid 2000 milliGRAM(s) Oral once  aspirin enteric coated 81 milliGRAM(s) Oral daily  atorvastatin 80 milliGRAM(s) Oral at bedtime  benztropine 2 milliGRAM(s) Oral every 12 hours  chlorhexidine 0.12% Liquid 30 milliLiter(s) Swish and Spit once  chlorhexidine 4% Liquid 1 Application(s) Topical once  dextrose 5%. 1000 milliLiter(s) (100 mL/Hr) IV Continuous <Continuous>  dextrose 5%. 1000 milliLiter(s) (50 mL/Hr) IV Continuous <Continuous>  dextrose 50% Injectable 25 Gram(s) IV Push once  dextrose 50% Injectable 12.5 Gram(s) IV Push once  dextrose 50% Injectable 25 Gram(s) IV Push once  divalproex ER 1500 milliGRAM(s) Oral at bedtime  escitalopram 10 milliGRAM(s) Oral at bedtime  famotidine    Tablet 20 milliGRAM(s) Oral daily  gabapentin 300 milliGRAM(s) Oral once  glucagon  Injectable 1 milliGRAM(s) IntraMuscular once  haloperidol     Tablet 20 milliGRAM(s) Oral at bedtime  insulin glargine Injectable (LANTUS) 44 Unit(s) SubCutaneous at bedtime  insulin lispro (ADMELOG) corrective regimen sliding scale   SubCutaneous three times a day before meals  insulin lispro (ADMELOG) corrective regimen sliding scale   SubCutaneous at bedtime  insulin lispro Injectable (ADMELOG) 8 Unit(s) SubCutaneous three times a day before meals  metoprolol tartrate 25 milliGRAM(s) Oral every 12 hours  QUEtiapine 400 milliGRAM(s) Oral at bedtime  senna 2 Tablet(s) Oral at bedtime  sodium chloride 0.9% lock flush 3 milliLiter(s) IV Push every 8 hours  vancomycin  IVPB 1750 milliGRAM(s) IV Intermittent once    MEDICATIONS  (PRN):  dextrose Oral Gel 15 Gram(s) Oral once PRN Blood Glucose LESS THAN 70 milliGRAM(s)/deciliter  melatonin 3 milliGRAM(s) Oral at bedtime PRN Sleep  zolpidem 5 milliGRAM(s) Oral at bedtime PRN Insomnia    LABS:                        11.4   8.10  )-----------( 238      ( 16 Jan 2023 04:47 )             35.2     01-16    141  |  104  |  14  ----------------------------<  143<H>  3.9   |  26  |  0.69    Ca    9.5      16 Jan 2023 04:47    TPro  7.0  /  Alb  3.8  /  TBili  0.2  /  DBili  x   /  AST  26  /  ALT  34  /  AlkPhos  80  01-16    PT/INR - ( 16 Jan 2023 04:47 )   PT: 12.9 sec;   INR: 1.11 ratio         PTT - ( 16 Jan 2023 04:47 )  PTT:36.6 sec    CAPILLARY BLOOD GLUCOSE      POCT Blood Glucose.: 161 mg/dL (16 Jan 2023 16:23)  POCT Blood Glucose.: 185 mg/dL (16 Jan 2023 11:15)  POCT Blood Glucose.: 120 mg/dL (16 Jan 2023 07:34)  POCT Blood Glucose.: 151 mg/dL (15 Marco 2023 21:23)          REVIEW OF SYSTEMS:  CONSTITUTIONAL: No fever, weight loss, or fatigue  EYES: No eye pain, visual disturbances, or discharge  ENMT:  No difficulty hearing, tinnitus, vertigo; No sinus or throat pain  NECK: No pain or stiffness  RESPIRATORY: No cough, wheezing, chills or hemoptysis; No shortness of breath  CARDIOVASCULAR: No chest pain, palpitations, dizziness, or leg swelling  GASTROINTESTINAL: No abdominal or epigastric pain. No nausea, vomiting, or hematemesis; No diarrhea or constipation. No melena or hematochezia.  GENITOURINARY: No dysuria, frequency, hematuria, or incontinence  NEUROLOGICAL: No headaches, memory loss, loss of strength, numbness, or tremors      Consultant(s) Notes Reviewed:  [x ] YES  [ ] NO    PHYSICAL EXAM:  GENERAL: NAD, Obese , not in any distress ,  HEAD:  Atraumatic, Normocephalic  EYES: EOMI, PERRLA, conjunctiva and sclera clear  ENMT: No tonsillar erythema, exudates, or enlargement; Moist mucous membranes, Good dentition, No lesions  NECK: Supple, No JVD, Normal thyroid  NERVOUS SYSTEM:  Alert & Oriented X3, No focal deficit   CHEST/LUNG: Good air entry bilateral with no  rales, rhonchi, wheezing, or rubs  HEART: Regular rate and rhythm; No murmurs, rubs, or gallops  ABDOMEN: Soft, Nontender, Nondistended; Bowel sounds present  EXTREMITIES:  2+ Peripheral Pulses, No clubbing, cyanosis, or edema  SKIN: No rashes or lesions    Care Discussed with Consultants/Other Providers [ x] YES  [ ] NO

## 2023-01-17 ENCOUNTER — APPOINTMENT (OUTPATIENT)
Dept: CARDIOTHORACIC SURGERY | Facility: HOSPITAL | Age: 58
End: 2023-01-17

## 2023-01-17 LAB
ALBUMIN SERPL ELPH-MCNC: 3.6 G/DL — SIGNIFICANT CHANGE UP (ref 3.3–5)
ALP SERPL-CCNC: 54 U/L — SIGNIFICANT CHANGE UP (ref 40–120)
ALT FLD-CCNC: 46 U/L — HIGH (ref 10–45)
ANION GAP SERPL CALC-SCNC: 9 MMOL/L — SIGNIFICANT CHANGE UP (ref 5–17)
APTT BLD: 28.7 SEC — SIGNIFICANT CHANGE UP (ref 27.5–35.5)
AST SERPL-CCNC: 72 U/L — HIGH (ref 10–40)
BASE EXCESS BLDV CALC-SCNC: 0.4 MMOL/L — SIGNIFICANT CHANGE UP (ref -2–3)
BASE EXCESS BLDV CALC-SCNC: 1.7 MMOL/L — SIGNIFICANT CHANGE UP (ref -2–3)
BASE EXCESS BLDV CALC-SCNC: 2.4 MMOL/L — SIGNIFICANT CHANGE UP (ref -2–3)
BASE EXCESS BLDV CALC-SCNC: 2.6 MMOL/L — SIGNIFICANT CHANGE UP (ref -2–3)
BASE EXCESS BLDV CALC-SCNC: 2.9 MMOL/L — SIGNIFICANT CHANGE UP (ref -2–3)
BASE EXCESS BLDV CALC-SCNC: 3.5 MMOL/L — HIGH (ref -2–3)
BASOPHILS # BLD AUTO: 0.03 K/UL — SIGNIFICANT CHANGE UP (ref 0–0.2)
BASOPHILS NFR BLD AUTO: 0.3 % — SIGNIFICANT CHANGE UP (ref 0–2)
BILIRUB SERPL-MCNC: 0.5 MG/DL — SIGNIFICANT CHANGE UP (ref 0.2–1.2)
BLOOD GAS VENOUS - CREATININE: SIGNIFICANT CHANGE UP MG/DL (ref 0.5–1.3)
BUN SERPL-MCNC: 11 MG/DL — SIGNIFICANT CHANGE UP (ref 7–23)
CA-I SERPL-SCNC: 0.98 MMOL/L — LOW (ref 1.15–1.33)
CA-I SERPL-SCNC: 1.03 MMOL/L — LOW (ref 1.15–1.33)
CA-I SERPL-SCNC: 1.04 MMOL/L — LOW (ref 1.15–1.33)
CA-I SERPL-SCNC: 1.04 MMOL/L — LOW (ref 1.15–1.33)
CA-I SERPL-SCNC: 1.05 MMOL/L — LOW (ref 1.15–1.33)
CA-I SERPL-SCNC: 1.06 MMOL/L — LOW (ref 1.15–1.33)
CALCIUM SERPL-MCNC: 9.2 MG/DL — SIGNIFICANT CHANGE UP (ref 8.4–10.5)
CHLORIDE BLDV-SCNC: 101 MMOL/L — SIGNIFICANT CHANGE UP (ref 96–108)
CHLORIDE BLDV-SCNC: 101 MMOL/L — SIGNIFICANT CHANGE UP (ref 96–108)
CHLORIDE BLDV-SCNC: 102 MMOL/L — SIGNIFICANT CHANGE UP (ref 96–108)
CHLORIDE SERPL-SCNC: 104 MMOL/L — SIGNIFICANT CHANGE UP (ref 96–108)
CK MB BLD-MCNC: 3.8 % — HIGH (ref 0–3.5)
CK MB CFR SERPL CALC: 29.1 NG/ML — HIGH (ref 0–6.7)
CK SERPL-CCNC: 775 U/L — HIGH (ref 30–200)
CO2 BLDV-SCNC: 27 MMOL/L — HIGH (ref 22–26)
CO2 BLDV-SCNC: 29 MMOL/L — HIGH (ref 22–26)
CO2 BLDV-SCNC: 30 MMOL/L — HIGH (ref 22–26)
CO2 BLDV-SCNC: 30 MMOL/L — HIGH (ref 22–26)
CO2 BLDV-SCNC: 31 MMOL/L — HIGH (ref 22–26)
CO2 BLDV-SCNC: 31 MMOL/L — HIGH (ref 22–26)
CO2 SERPL-SCNC: 26 MMOL/L — SIGNIFICANT CHANGE UP (ref 22–31)
CREAT SERPL-MCNC: 0.64 MG/DL — SIGNIFICANT CHANGE UP (ref 0.5–1.3)
EGFR: 110 ML/MIN/1.73M2 — SIGNIFICANT CHANGE UP
EOSINOPHIL # BLD AUTO: 0.06 K/UL — SIGNIFICANT CHANGE UP (ref 0–0.5)
EOSINOPHIL NFR BLD AUTO: 0.5 % — SIGNIFICANT CHANGE UP (ref 0–6)
FIBRINOGEN PPP-MCNC: 205 MG/DL — SIGNIFICANT CHANGE UP (ref 200–445)
GAS PNL BLDA: SIGNIFICANT CHANGE UP
GAS PNL BLDV: 135 MMOL/L — LOW (ref 136–145)
GAS PNL BLDV: 136 MMOL/L — SIGNIFICANT CHANGE UP (ref 136–145)
GAS PNL BLDV: 137 MMOL/L — SIGNIFICANT CHANGE UP (ref 136–145)
GAS PNL BLDV: SIGNIFICANT CHANGE UP
GLUCOSE BLDC GLUCOMTR-MCNC: 141 MG/DL — HIGH (ref 70–99)
GLUCOSE BLDC GLUCOMTR-MCNC: 155 MG/DL — HIGH (ref 70–99)
GLUCOSE BLDC GLUCOMTR-MCNC: 156 MG/DL — HIGH (ref 70–99)
GLUCOSE BLDC GLUCOMTR-MCNC: 158 MG/DL — HIGH (ref 70–99)
GLUCOSE BLDC GLUCOMTR-MCNC: 159 MG/DL — HIGH (ref 70–99)
GLUCOSE BLDV-MCNC: 180 MG/DL — HIGH (ref 70–99)
GLUCOSE BLDV-MCNC: 255 MG/DL — HIGH (ref 70–99)
GLUCOSE BLDV-MCNC: 262 MG/DL — HIGH (ref 70–99)
GLUCOSE BLDV-MCNC: 270 MG/DL — HIGH (ref 70–99)
GLUCOSE BLDV-MCNC: 279 MG/DL — HIGH (ref 70–99)
GLUCOSE BLDV-MCNC: 303 MG/DL — HIGH (ref 70–99)
GLUCOSE SERPL-MCNC: 159 MG/DL — HIGH (ref 70–99)
HCO3 BLDV-SCNC: 26 MMOL/L — SIGNIFICANT CHANGE UP (ref 22–29)
HCO3 BLDV-SCNC: 28 MMOL/L — SIGNIFICANT CHANGE UP (ref 22–29)
HCO3 BLDV-SCNC: 28 MMOL/L — SIGNIFICANT CHANGE UP (ref 22–29)
HCO3 BLDV-SCNC: 29 MMOL/L — SIGNIFICANT CHANGE UP (ref 22–29)
HCT VFR BLD CALC: 26.1 % — LOW (ref 39–50)
HCT VFR BLDA CALC: 22 % — LOW (ref 39–51)
HCT VFR BLDA CALC: 22 % — LOW (ref 39–51)
HCT VFR BLDA CALC: 23 % — LOW (ref 39–51)
HCT VFR BLDA CALC: 23 % — LOW (ref 39–51)
HCT VFR BLDA CALC: 24 % — LOW (ref 39–51)
HCT VFR BLDA CALC: 24 % — LOW (ref 39–51)
HGB BLD CALC-MCNC: 7.2 G/DL — LOW (ref 12.6–17.4)
HGB BLD CALC-MCNC: 7.4 G/DL — LOW (ref 12.6–17.4)
HGB BLD CALC-MCNC: 7.5 G/DL — LOW (ref 12.6–17.4)
HGB BLD CALC-MCNC: 7.8 G/DL — LOW (ref 12.6–17.4)
HGB BLD CALC-MCNC: 7.9 G/DL — LOW (ref 12.6–17.4)
HGB BLD CALC-MCNC: 8.1 G/DL — LOW (ref 12.6–17.4)
HGB BLD-MCNC: 8.6 G/DL — LOW (ref 13–17)
IMM GRANULOCYTES NFR BLD AUTO: 1.8 % — HIGH (ref 0–0.9)
INR BLD: 1.29 RATIO — HIGH (ref 0.88–1.16)
LACTATE BLDV-MCNC: 1.1 MMOL/L — SIGNIFICANT CHANGE UP (ref 0.5–2)
LACTATE BLDV-MCNC: 1.1 MMOL/L — SIGNIFICANT CHANGE UP (ref 0.5–2)
LACTATE BLDV-MCNC: 1.2 MMOL/L — SIGNIFICANT CHANGE UP (ref 0.5–2)
LACTATE BLDV-MCNC: 1.5 MMOL/L — SIGNIFICANT CHANGE UP (ref 0.5–2)
LYMPHOCYTES # BLD AUTO: 19.8 % — SIGNIFICANT CHANGE UP (ref 13–44)
LYMPHOCYTES # BLD AUTO: 2.3 K/UL — SIGNIFICANT CHANGE UP (ref 1–3.3)
MAGNESIUM SERPL-MCNC: 1.8 MG/DL — SIGNIFICANT CHANGE UP (ref 1.6–2.6)
MCHC RBC-ENTMCNC: 28.2 PG — SIGNIFICANT CHANGE UP (ref 27–34)
MCHC RBC-ENTMCNC: 33 GM/DL — SIGNIFICANT CHANGE UP (ref 32–36)
MCV RBC AUTO: 85.6 FL — SIGNIFICANT CHANGE UP (ref 80–100)
MONOCYTES # BLD AUTO: 1.46 K/UL — HIGH (ref 0–0.9)
MONOCYTES NFR BLD AUTO: 12.6 % — SIGNIFICANT CHANGE UP (ref 2–14)
NEUTROPHILS # BLD AUTO: 7.55 K/UL — HIGH (ref 1.8–7.4)
NEUTROPHILS NFR BLD AUTO: 65 % — SIGNIFICANT CHANGE UP (ref 43–77)
NRBC # BLD: 0 /100 WBCS — SIGNIFICANT CHANGE UP (ref 0–0)
PCO2 BLDV: 46 MMHG — SIGNIFICANT CHANGE UP (ref 42–55)
PCO2 BLDV: 50 MMHG — SIGNIFICANT CHANGE UP (ref 42–55)
PCO2 BLDV: 50 MMHG — SIGNIFICANT CHANGE UP (ref 42–55)
PCO2 BLDV: 51 MMHG — SIGNIFICANT CHANGE UP (ref 42–55)
PCO2 BLDV: 52 MMHG — SIGNIFICANT CHANGE UP (ref 42–55)
PCO2 BLDV: 55 MMHG — SIGNIFICANT CHANGE UP (ref 42–55)
PH BLDV: 7.33 — SIGNIFICANT CHANGE UP (ref 7.32–7.43)
PH BLDV: 7.35 — SIGNIFICANT CHANGE UP (ref 7.32–7.43)
PH BLDV: 7.36 — SIGNIFICANT CHANGE UP (ref 7.32–7.43)
PHOSPHATE SERPL-MCNC: 3.6 MG/DL — SIGNIFICANT CHANGE UP (ref 2.5–4.5)
PLATELET # BLD AUTO: 203 K/UL — SIGNIFICANT CHANGE UP (ref 150–400)
PO2 BLDV: 47 MMHG — HIGH (ref 25–45)
PO2 BLDV: 58 MMHG — HIGH (ref 25–45)
PO2 BLDV: 59 MMHG — HIGH (ref 25–45)
PO2 BLDV: 61 MMHG — HIGH (ref 25–45)
PO2 BLDV: 61 MMHG — HIGH (ref 25–45)
PO2 BLDV: 68 MMHG — HIGH (ref 25–45)
POTASSIUM BLDV-SCNC: 3.6 MMOL/L — SIGNIFICANT CHANGE UP (ref 3.5–5.1)
POTASSIUM BLDV-SCNC: 3.9 MMOL/L — SIGNIFICANT CHANGE UP (ref 3.5–5.1)
POTASSIUM BLDV-SCNC: 3.9 MMOL/L — SIGNIFICANT CHANGE UP (ref 3.5–5.1)
POTASSIUM BLDV-SCNC: 4.2 MMOL/L — SIGNIFICANT CHANGE UP (ref 3.5–5.1)
POTASSIUM BLDV-SCNC: 4.3 MMOL/L — SIGNIFICANT CHANGE UP (ref 3.5–5.1)
POTASSIUM BLDV-SCNC: 4.3 MMOL/L — SIGNIFICANT CHANGE UP (ref 3.5–5.1)
POTASSIUM SERPL-MCNC: 4.4 MMOL/L — SIGNIFICANT CHANGE UP (ref 3.5–5.3)
POTASSIUM SERPL-SCNC: 4.4 MMOL/L — SIGNIFICANT CHANGE UP (ref 3.5–5.3)
PROT SERPL-MCNC: 5.7 G/DL — LOW (ref 6–8.3)
PROTHROM AB SERPL-ACNC: 15 SEC — HIGH (ref 10.5–13.4)
RBC # BLD: 3.05 M/UL — LOW (ref 4.2–5.8)
RBC # FLD: 14.7 % — HIGH (ref 10.3–14.5)
SAO2 % BLDV: 74 % — SIGNIFICANT CHANGE UP (ref 67–88)
SAO2 % BLDV: 85.7 % — SIGNIFICANT CHANGE UP (ref 67–88)
SAO2 % BLDV: 87.3 % — SIGNIFICANT CHANGE UP (ref 67–88)
SAO2 % BLDV: 88.4 % — HIGH (ref 67–88)
SAO2 % BLDV: 90 % — HIGH (ref 67–88)
SAO2 % BLDV: 93 % — HIGH (ref 67–88)
SODIUM SERPL-SCNC: 139 MMOL/L — SIGNIFICANT CHANGE UP (ref 135–145)
TROPONIN T, HIGH SENSITIVITY RESULT: 784 NG/L — HIGH (ref 0–51)
WBC # BLD: 11.61 K/UL — HIGH (ref 3.8–10.5)
WBC # FLD AUTO: 11.61 K/UL — HIGH (ref 3.8–10.5)

## 2023-01-17 PROCEDURE — 93010 ELECTROCARDIOGRAM REPORT: CPT

## 2023-01-17 PROCEDURE — 33508 ENDOSCOPIC VEIN HARVEST: CPT | Mod: AS,59

## 2023-01-17 PROCEDURE — 33533 CABG ARTERIAL SINGLE: CPT | Mod: AS

## 2023-01-17 PROCEDURE — 33519 CABG ARTERY-VEIN THREE: CPT

## 2023-01-17 PROCEDURE — 94010 BREATHING CAPACITY TEST: CPT | Mod: 26

## 2023-01-17 PROCEDURE — 33533 CABG ARTERIAL SINGLE: CPT

## 2023-01-17 PROCEDURE — 21825 TREAT STERNUM FRACTURE: CPT | Mod: AS

## 2023-01-17 PROCEDURE — 33519 CABG ARTERY-VEIN THREE: CPT | Mod: AS

## 2023-01-17 PROCEDURE — 33508 ENDOSCOPIC VEIN HARVEST: CPT | Mod: 59

## 2023-01-17 PROCEDURE — 71045 X-RAY EXAM CHEST 1 VIEW: CPT | Mod: 26

## 2023-01-17 PROCEDURE — 99291 CRITICAL CARE FIRST HOUR: CPT

## 2023-01-17 PROCEDURE — 21825 TREAT STERNUM FRACTURE: CPT

## 2023-01-17 DEVICE — SCREW SF 2.4X14MM: Type: IMPLANTABLE DEVICE | Status: FUNCTIONAL

## 2023-01-17 DEVICE — SURGICEL FIBRILLAR 2 X 4": Type: IMPLANTABLE DEVICE | Status: FUNCTIONAL

## 2023-01-17 DEVICE — CANNULA RCSP 15FR X 12.5" AUTO-INFLATE CUFF WITH SOLID STYLET: Type: IMPLANTABLE DEVICE | Status: FUNCTIONAL

## 2023-01-17 DEVICE — CHEST DRAIN THORACIC ARGYLE PVC 32FR STRAIGHT: Type: IMPLANTABLE DEVICE | Status: FUNCTIONAL

## 2023-01-17 DEVICE — PACING WIRE WHITE M-24 BAREWIRE 37MM X 89MM: Type: IMPLANTABLE DEVICE | Status: FUNCTIONAL

## 2023-01-17 DEVICE — LIGATING CLIPS WECK HORIZON LARGE (ORANGE) 6: Type: IMPLANTABLE DEVICE | Status: FUNCTIONAL

## 2023-01-17 DEVICE — CANNULA AORTIC ROOT WITH VENT LINE 9G X 13.3CM FLANGED PRESSURE MONITORING: Type: IMPLANTABLE DEVICE | Status: FUNCTIONAL

## 2023-01-17 DEVICE — KIT A-LINE 1LUM 20G X 12CM SAFE KIT: Type: IMPLANTABLE DEVICE | Status: FUNCTIONAL

## 2023-01-17 DEVICE — KIT CVC 2LUM 7FRX16CM BLU FLX TIP: Type: IMPLANTABLE DEVICE | Status: FUNCTIONAL

## 2023-01-17 DEVICE — CATH VENT VENTRICULAR PVC 18FR X 4.25" TIP PERFORATION: Type: IMPLANTABLE DEVICE | Status: FUNCTIONAL

## 2023-01-17 DEVICE — PLATE STERNALOCK 4H 2.4MM: Type: IMPLANTABLE DEVICE | Status: FUNCTIONAL

## 2023-01-17 DEVICE — PACING WIRE WHITE M-25 WINGED WIRE 37MM X 89MM: Type: IMPLANTABLE DEVICE | Status: FUNCTIONAL

## 2023-01-17 DEVICE — CHEST DRAIN THORACIC ARGYLE PVC 32FR RIGHT ANGLE: Type: IMPLANTABLE DEVICE | Status: FUNCTIONAL

## 2023-01-17 DEVICE — CANNULA AORTIC PERFUSION EZ GLIDE STRAIGHT 24FR X 35CM NON-VENTED: Type: IMPLANTABLE DEVICE | Status: FUNCTIONAL

## 2023-01-17 DEVICE — LIGATING CLIPS WECK HORIZON MEDIUM (BLUE) 24: Type: IMPLANTABLE DEVICE | Status: FUNCTIONAL

## 2023-01-17 DEVICE — LIGATING CLIPS WECK HORIZON SMALL-WIDE (RED) 24: Type: IMPLANTABLE DEVICE | Status: FUNCTIONAL

## 2023-01-17 DEVICE — CANNULA AORTIC PERFUSION EZ GLIDE STRAIGHT 21FR X 35CM NON-VENTED: Type: IMPLANTABLE DEVICE | Status: FUNCTIONAL

## 2023-01-17 DEVICE — CANNULA VENOUS 2 STAGE THIN FLEX 36/46FR X 1/2" WITH RETURN DIAL: Type: IMPLANTABLE DEVICE | Status: FUNCTIONAL

## 2023-01-17 RX ORDER — ASPIRIN/CALCIUM CARB/MAGNESIUM 324 MG
300 TABLET ORAL ONCE
Refills: 0 | Status: COMPLETED | OUTPATIENT
Start: 2023-01-17

## 2023-01-17 RX ORDER — AMIODARONE HYDROCHLORIDE 400 MG/1
400 TABLET ORAL
Refills: 0 | Status: COMPLETED | OUTPATIENT
Start: 2023-01-17 | End: 2023-01-20

## 2023-01-17 RX ORDER — SODIUM CHLORIDE 9 MG/ML
250 INJECTION, SOLUTION INTRAVENOUS ONCE
Refills: 0 | Status: COMPLETED | OUTPATIENT
Start: 2023-01-17 | End: 2023-01-17

## 2023-01-17 RX ORDER — OXYCODONE HYDROCHLORIDE 5 MG/1
10 TABLET ORAL EVERY 4 HOURS
Refills: 0 | Status: DISCONTINUED | OUTPATIENT
Start: 2023-01-17 | End: 2023-01-20

## 2023-01-17 RX ORDER — CHLORHEXIDINE GLUCONATE 213 G/1000ML
15 SOLUTION TOPICAL EVERY 12 HOURS
Refills: 0 | Status: DISCONTINUED | OUTPATIENT
Start: 2023-01-17 | End: 2023-01-18

## 2023-01-17 RX ORDER — INSULIN HUMAN 100 [IU]/ML
3 INJECTION, SOLUTION SUBCUTANEOUS
Qty: 100 | Refills: 0 | Status: DISCONTINUED | OUTPATIENT
Start: 2023-01-17 | End: 2023-01-19

## 2023-01-17 RX ORDER — ALBUMIN HUMAN 25 %
250 VIAL (ML) INTRAVENOUS ONCE
Refills: 0 | Status: COMPLETED | OUTPATIENT
Start: 2023-01-17 | End: 2023-01-17

## 2023-01-17 RX ORDER — DEXMEDETOMIDINE HYDROCHLORIDE IN 0.9% SODIUM CHLORIDE 4 UG/ML
0.3 INJECTION INTRAVENOUS
Qty: 200 | Refills: 0 | Status: DISCONTINUED | OUTPATIENT
Start: 2023-01-17 | End: 2023-01-17

## 2023-01-17 RX ORDER — NOREPINEPHRINE BITARTRATE/D5W 8 MG/250ML
0.05 PLASTIC BAG, INJECTION (ML) INTRAVENOUS
Qty: 8 | Refills: 0 | Status: DISCONTINUED | OUTPATIENT
Start: 2023-01-17 | End: 2023-01-17

## 2023-01-17 RX ORDER — ASCORBIC ACID 60 MG
500 TABLET,CHEWABLE ORAL
Refills: 0 | Status: COMPLETED | OUTPATIENT
Start: 2023-01-17 | End: 2023-01-22

## 2023-01-17 RX ORDER — POLYETHYLENE GLYCOL 3350 17 G/17G
17 POWDER, FOR SOLUTION ORAL DAILY
Refills: 0 | Status: DISCONTINUED | OUTPATIENT
Start: 2023-01-18 | End: 2023-01-23

## 2023-01-17 RX ORDER — POTASSIUM CHLORIDE 20 MEQ
10 PACKET (EA) ORAL
Refills: 0 | Status: DISCONTINUED | OUTPATIENT
Start: 2023-01-17 | End: 2023-01-18

## 2023-01-17 RX ORDER — MEPERIDINE HYDROCHLORIDE 50 MG/ML
25 INJECTION INTRAMUSCULAR; INTRAVENOUS; SUBCUTANEOUS ONCE
Refills: 0 | Status: DISCONTINUED | OUTPATIENT
Start: 2023-01-17 | End: 2023-01-17

## 2023-01-17 RX ORDER — CHLORHEXIDINE GLUCONATE 213 G/1000ML
5 SOLUTION TOPICAL
Refills: 0 | Status: DISCONTINUED | OUTPATIENT
Start: 2023-01-17 | End: 2023-01-18

## 2023-01-17 RX ORDER — HYDROMORPHONE HYDROCHLORIDE 2 MG/ML
0.5 INJECTION INTRAMUSCULAR; INTRAVENOUS; SUBCUTANEOUS EVERY 6 HOURS
Refills: 0 | Status: DISCONTINUED | OUTPATIENT
Start: 2023-01-17 | End: 2023-01-19

## 2023-01-17 RX ORDER — ACETAMINOPHEN 500 MG
650 TABLET ORAL EVERY 6 HOURS
Refills: 0 | Status: COMPLETED | OUTPATIENT
Start: 2023-01-20 | End: 2023-12-19

## 2023-01-17 RX ORDER — CEFUROXIME AXETIL 250 MG
1500 TABLET ORAL EVERY 8 HOURS
Refills: 0 | Status: COMPLETED | OUTPATIENT
Start: 2023-01-17 | End: 2023-01-19

## 2023-01-17 RX ORDER — GABAPENTIN 400 MG/1
100 CAPSULE ORAL EVERY 8 HOURS
Refills: 0 | Status: COMPLETED | OUTPATIENT
Start: 2023-01-17 | End: 2023-01-22

## 2023-01-17 RX ORDER — ASPIRIN/CALCIUM CARB/MAGNESIUM 324 MG
325 TABLET ORAL DAILY
Refills: 0 | Status: DISCONTINUED | OUTPATIENT
Start: 2023-01-17 | End: 2023-01-22

## 2023-01-17 RX ORDER — PANTOPRAZOLE SODIUM 20 MG/1
40 TABLET, DELAYED RELEASE ORAL DAILY
Refills: 0 | Status: DISCONTINUED | OUTPATIENT
Start: 2023-01-17 | End: 2023-01-18

## 2023-01-17 RX ORDER — OXYCODONE HYDROCHLORIDE 5 MG/1
5 TABLET ORAL EVERY 4 HOURS
Refills: 0 | Status: DISCONTINUED | OUTPATIENT
Start: 2023-01-17 | End: 2023-01-23

## 2023-01-17 RX ORDER — DEXMEDETOMIDINE HYDROCHLORIDE IN 0.9% SODIUM CHLORIDE 4 UG/ML
0.5 INJECTION INTRAVENOUS
Qty: 200 | Refills: 0 | Status: DISCONTINUED | OUTPATIENT
Start: 2023-01-17 | End: 2023-01-18

## 2023-01-17 RX ORDER — DEXTROSE 50 % IN WATER 50 %
25 SYRINGE (ML) INTRAVENOUS
Refills: 0 | Status: DISCONTINUED | OUTPATIENT
Start: 2023-01-17 | End: 2023-01-17

## 2023-01-17 RX ORDER — SENNA PLUS 8.6 MG/1
2 TABLET ORAL AT BEDTIME
Refills: 0 | Status: DISCONTINUED | OUTPATIENT
Start: 2023-01-18 | End: 2023-01-23

## 2023-01-17 RX ORDER — SODIUM CHLORIDE 9 MG/ML
1000 INJECTION INTRAMUSCULAR; INTRAVENOUS; SUBCUTANEOUS
Refills: 0 | Status: DISCONTINUED | OUTPATIENT
Start: 2023-01-17 | End: 2023-01-23

## 2023-01-17 RX ORDER — DEXTROSE 50 % IN WATER 50 %
50 SYRINGE (ML) INTRAVENOUS
Refills: 0 | Status: DISCONTINUED | OUTPATIENT
Start: 2023-01-17 | End: 2023-01-17

## 2023-01-17 RX ORDER — CHLORHEXIDINE GLUCONATE 213 G/1000ML
1 SOLUTION TOPICAL DAILY
Refills: 0 | Status: DISCONTINUED | OUTPATIENT
Start: 2023-01-17 | End: 2023-01-23

## 2023-01-17 RX ORDER — ACETAMINOPHEN 500 MG
650 TABLET ORAL EVERY 6 HOURS
Refills: 0 | Status: COMPLETED | OUTPATIENT
Start: 2023-01-17 | End: 2023-01-20

## 2023-01-17 RX ORDER — NICARDIPINE HYDROCHLORIDE 30 MG/1
5 CAPSULE, EXTENDED RELEASE ORAL
Qty: 40 | Refills: 0 | Status: DISCONTINUED | OUTPATIENT
Start: 2023-01-17 | End: 2023-01-18

## 2023-01-17 RX ADMIN — Medication 1000 MILLIGRAM(S): at 07:09

## 2023-01-17 RX ADMIN — GABAPENTIN 300 MILLIGRAM(S): 400 CAPSULE ORAL at 07:08

## 2023-01-17 RX ADMIN — CHLORHEXIDINE GLUCONATE 30 MILLILITER(S): 213 SOLUTION TOPICAL at 07:11

## 2023-01-17 RX ADMIN — CHLORHEXIDINE GLUCONATE 1 APPLICATION(S): 213 SOLUTION TOPICAL at 06:48

## 2023-01-17 RX ADMIN — SODIUM CHLORIDE 1000 MILLILITER(S): 9 INJECTION, SOLUTION INTRAVENOUS at 21:30

## 2023-01-17 RX ADMIN — SODIUM CHLORIDE 3 MILLILITER(S): 9 INJECTION INTRAMUSCULAR; INTRAVENOUS; SUBCUTANEOUS at 07:11

## 2023-01-17 RX ADMIN — Medication 100 MILLIGRAM(S): at 18:03

## 2023-01-17 RX ADMIN — Medication 125 MILLILITER(S): at 21:55

## 2023-01-17 NOTE — BRIEF OPERATIVE NOTE - COMMENTS
EBL N/A due to cell saver and cardiotomy suctions  Drips: Levo, Precedex, Insulin  I first assisted for the entirety of the case, including sternotomy, cardiopulmonary bypass, placement of coronary grafts and closing.  There were no residents/fellows available for this case.

## 2023-01-17 NOTE — PROGRESS NOTE ADULT - SUBJECTIVE AND OBJECTIVE BOX
Chief complaint    Patient is a 57y old  Male who presents with a chief complaint of CABG eval (16 Jan 2023 16:25)   Review of systems  Patient in bed, appears comfortable.    Labs and Fingersticks  CAPILLARY BLOOD GLUCOSE      POCT Blood Glucose.: 141 mg/dL (17 Jan 2023 06:49)  POCT Blood Glucose.: 155 mg/dL (16 Jan 2023 21:28)      Anion Gap, Serum: 11 (01-16 @ 04:47)      Calcium, Total Serum: 9.5 (01-16 @ 04:47)  Albumin, Serum: 3.8 (01-16 @ 04:47)    Alanine Aminotransferase (ALT/SGPT): 34 (01-16 @ 04:47)  Alkaline Phosphatase, Serum: 80 (01-16 @ 04:47)  Aspartate Aminotransferase (AST/SGOT): 26 (01-16 @ 04:47)        01-16    141  |  104  |  14  ----------------------------<  143<H>  3.9   |  26  |  0.69    Ca    9.5      16 Jan 2023 04:47    TPro  7.0  /  Alb  3.8  /  TBili  0.2  /  DBili  x   /  AST  26  /  ALT  34  /  AlkPhos  80  01-16                        11.4   8.10  )-----------( 238      ( 16 Jan 2023 04:47 )             35.2     Medications  MEDICATIONS  (STANDING):      Physical Exam  General: Patient appears comfortable.  Vital Signs Last 12 Hrs  T(F): --  HR: 70 (01-17-23 @ 08:04) (70 - 70)  BP: 127/83 (01-17-23 @ 08:04) (127/83 - 127/83)  BP(mean): 98 (01-17-23 @ 08:04) (98 - 98)  RR: 18 (01-17-23 @ 08:04) (18 - 18)  SpO2: 95% (01-17-23 @ 08:04) (95% - 95%)  Neck: No palpable thyroid nodules.  CVS: S1S2, No murmurs  Respiratory: No wheezing, no crepitations  GI: Abdomen soft, non tender.  Musculoskeletal:  edema lower extremities.     Diagnostics    Free Thyroxine, Serum: AM Sched. Collection: 15-Marco-2023 06:00 (01-14 @ 14:18)  Thyroid Stimulating Hormone, Serum: AM Sched. Collection: 15-Marco-2023 06:00 (01-14 @ 14:18)

## 2023-01-17 NOTE — PRE-ANESTHESIA EVALUATION ADULT - NSANTHADDINFOFT_GEN_ALL_CORE
Anesthetic plan, including risks and benefits, discussed with patient.  Risks included but not limited to: dental/throat damage or swelling, line site infection/clot/bleeding, heart attack, stroke, and/or death.  The patient conveyed understanding.

## 2023-01-17 NOTE — BRIEF OPERATIVE NOTE - OPERATION/FINDINGS
Multivessel CAD. s/p CABG x4 (LIMA-LAD, LSVG-OM, LSVG-PDA, LSVG-Diagonal)  Aortic Cross Clamp Time: 128 min

## 2023-01-17 NOTE — PRE-ANESTHESIA EVALUATION ADULT - NSANTHPMHFT_GEN_ALL_CORE
57M /w Penicillin allergy, PMH  HTN, HLD, DM2, bipolar disorder, SHIN s/p uvula resection not on CPAP,  known CAD with MI with RCA/OM stents 2013 who presented to his Cardiologist complaining of progressive SOB/LEMOS over the last 4 weeks.  Denies having typical chest pain type symptoms.  Had abnormal stress test and underwent elective cath yesterday on 1/11/23 which revealed EF 65, pLAD 90, mLAD 20, dLAD 80, OM 99, mRCA 95 via RRA access.  Now transferred to [facility] for CABG ** from progress note**  57M /w Penicillin allergy, PMH  HTN, HLD, DM2, bipolar disorder, SHIN s/p uvula resection not on CPAP,  known CAD with MI with RCA/OM stents 2013 who presented to his Cardiologist complaining of progressive SOB/LEMOS over the last 4 weeks.  Denies having typical chest pain type symptoms.  Had abnormal stress test and underwent elective cath on 1/11/23 which revealed EF 65, pLAD 90, mLAD 20, dLAD 80, OM 99, mRCA 95; now for CABG.

## 2023-01-17 NOTE — PROGRESS NOTE ADULT - ASSESSMENT
57M /w Penicillin allergy, PMH  HTN, HLD, DM2, bipolar disorder, SHIN s/p uvula resection not on CPAP,  known CAD with MI with RCA/OM stents 2013 who presented to his Cardiologist complaining of progressive SOB/LEMOS over the last 4 weeks.  Denies having typical chest pain type symptoms.  Had abnormal stress test and underwent elective cath yesterday on 1/11/23 which revealed EF 65, pLAD 90, mLAD 20, dLAD 80, OM 99, mRCA 95 via RRA access.  Currently patient denies chest pain/sob.  No headache/dizziness.  No abdominal pain/nausea/vomiting.  No bowel/urinary symptoms.  No lower extremity pain/numbness/tingling.  No tooth pain.  Patient states he typically ambulates independently without use of assistive devices and lives with his wife in [place].       Problem/Recommendation - 1:  ·  Problem: CAD, multiple vessel.   ·  Recommendation: S/P CABG x4 . Intubated   CTS help appreciated .      Problem/Recommendation - 2:  ·  Problem: Diabetes.   ·  Recommendation: Uncontrolled as HgbA1C high so endo helping.   On Insulin.   Sugars under good control.     Problem/Recommendation - 3:  ·  Problem: Hypertension.   ·  Recommendation: BP readings fine.   BP meds with hold parameters.     Problem/Recommendation - 4:  ·  Problem: Hyperlipidemia.   ·  Recommendation: Statin.     Problem/Recommendation - 5:  ·  Problem: Bipolar disorder.   ·  Recommendation: Home meds.

## 2023-01-17 NOTE — PRE-OP CHECKLIST - MUPIRONCIN COMMENTS
Hibiclens Shower done at [facility] 1/16/23 Night prior to surgery and Hibiclens Shower done at [facility] 1/16/23 Night prior to surgery and 1/17/23 AM prior to Surgery

## 2023-01-17 NOTE — PROGRESS NOTE ADULT - ASSESSMENT
Assessment  DMT2: 57y Male with DM T2 with hyperglycemia admitted with chest pain  A1C is 8.1%, patient was on insulin at home, was on basal bolus and insulin coverage, blood sugars improving, NPO for surgery.  Patient is high risk with high level decision making due to uncontrolled diabetes, has increased risk for complications. Tight sugar control is not recommended for this patient.  CAD: CABG, on medications, monitored, asymptomatic.  HTN: On antihypertensive medications, monitored, asymptomatic.                Guerrero Sequeira MD  Cell:  [phone]  Office: [phone]

## 2023-01-17 NOTE — PROGRESS NOTE ADULT - SUBJECTIVE AND OBJECTIVE BOX
Chest, PA and Lateral, 3 views



History: Dry cough x6 weeks, R05



Comparison: April 15, 2010 , March 2009 and July 2007



Findings: There is no enlargement of the right hilum seen on the lateral view only. The left hilum lo
oks stable. Moderately prominent lung volumes and perihilar bronchial wall thickening are chronic or 
recurrent and consistent with COPD/airways disease. Lungs are clear, without infiltrate or consolidat
ion. Heart size is newly mildly enlarged. The pulmonary vascularity remains normal. There is no adeno
yash or mass lesion. There is no pleural effusion or pneumothorax. Bones are unremarkable for age. T
here are stable old healed anterior right rib fracture deformities.



Impression: 1. COPD/airways disease without pneumonia.

2. New mild cardiomegaly without failure

3. Interval enlargement of the right hilum seen on the lateral view only. Consider chest CT with cont
rast, to evaluate for adenopathy or mass.



Results called to Dr. Mayfield at 2:23 pm. Date of Service  : 01-17-23 @ 20:01    INTERVAL HPI/OVERNIGHT EVENTS:  Vital Signs Last 24 Hrs  T(C): 2.1 (17 Jan 2023 20:00), Max: 36.9 (17 Jan 2023 04:16)  T(F): 35.9 (17 Jan 2023 20:00), Max: 98.4 (17 Jan 2023 04:16)  HR: 72 (17 Jan 2023 19:00) (67 - 72)  BP: 127/83 (17 Jan 2023 08:04) (127/83 - 127/83)  BP(mean): 98 (17 Jan 2023 08:04) (98 - 98)  RR: 12 (17 Jan 2023 19:00) (12 - 18)  SpO2: 100% (17 Jan 2023 19:00) (95% - 100%)    Parameters below as of 17 Jan 2023 20:00  Patient On (Oxygen Delivery Method): ventilator    O2 Concentration (%): 50  I&O's Summary    16 Jan 2023 07:01  -  17 Jan 2023 07:00  --------------------------------------------------------  IN: 1330 mL / OUT: 0 mL / NET: 1330 mL    17 Jan 2023 07:01  -  17 Jan 2023 20:01  --------------------------------------------------------  IN: 874 mL / OUT: 325 mL / NET: 549 mL      MEDICATIONS  (STANDING):  acetaminophen     Tablet .. 650 milliGRAM(s) Oral every 6 hours  aMIOdarone    Tablet 400 milliGRAM(s) Oral two times a day  ascorbic acid 500 milliGRAM(s) Oral two times a day  aspirin enteric coated 325 milliGRAM(s) Oral daily  aspirin Suppository 300 milliGRAM(s) Rectal once  cefuroxime  IVPB 1500 milliGRAM(s) IV Intermittent every 8 hours  chlorhexidine 0.12% Liquid 15 milliLiter(s) Oral Mucosa every 12 hours  chlorhexidine 0.12% Liquid 5 milliLiter(s) Oral Mucosa two times a day  chlorhexidine 2% Cloths 1 Application(s) Topical daily  dexMEDEtomidine Infusion 0.3 MICROgram(s)/kG/Hr (8.55 mL/Hr) IV Continuous <Continuous>  dextrose 50% Injectable 50 milliLiter(s) IV Push every 15 minutes  dextrose 50% Injectable 25 milliLiter(s) IV Push every 15 minutes  gabapentin 100 milliGRAM(s) Oral every 8 hours  insulin regular Infusion 3 Unit(s)/Hr (3 mL/Hr) IV Continuous <Continuous>  meperidine     Injectable 25 milliGRAM(s) IV Push once  norepinephrine Infusion 0.05 MICROgram(s)/kG/Min (10.7 mL/Hr) IV Continuous <Continuous>  pantoprazole  Injectable 40 milliGRAM(s) IV Push daily  potassium chloride  10 mEq/50 mL IVPB 10 milliEquivalent(s) IV Intermittent every 1 hour  potassium chloride  10 mEq/50 mL IVPB 10 milliEquivalent(s) IV Intermittent every 1 hour  potassium chloride  10 mEq/50 mL IVPB 10 milliEquivalent(s) IV Intermittent every 1 hour  sodium chloride 0.9%. 1000 milliLiter(s) (10 mL/Hr) IV Continuous <Continuous>    MEDICATIONS  (PRN):  HYDROmorphone  Injectable 0.5 milliGRAM(s) IV Push every 6 hours PRN Breakthrough Pain  oxyCODONE    IR 5 milliGRAM(s) Oral every 4 hours PRN Moderate Pain (4 - 6)  oxyCODONE    IR 10 milliGRAM(s) Oral every 4 hours PRN Severe Pain (7 - 10)    LABS:                        8.6    11.61 )-----------( 203      ( 17 Jan 2023 18:02 )             26.1     01-17    139  |  104  |  11  ----------------------------<  159<H>  4.4   |  26  |  0.64    Ca    9.2      17 Jan 2023 18:03  Phos  3.6     01-17  Mg     1.8     01-17    TPro  5.7<L>  /  Alb  3.6  /  TBili  0.5  /  DBili  x   /  AST  72<H>  /  ALT  46<H>  /  AlkPhos  54  01-17    PT/INR - ( 17 Jan 2023 18:02 )   PT: 15.0 sec;   INR: 1.29 ratio         PTT - ( 17 Jan 2023 18:02 )  PTT:28.7 sec    CAPILLARY BLOOD GLUCOSE      POCT Blood Glucose.: 158 mg/dL (17 Jan 2023 19:59)  POCT Blood Glucose.: 141 mg/dL (17 Jan 2023 06:49)  POCT Blood Glucose.: 155 mg/dL (16 Jan 2023 21:28)      ABG - ( 17 Jan 2023 18:55 )  pH, Arterial: 7.42  pH, Blood: x     /  pCO2: 42    /  pO2: 134   / HCO3: 27    / Base Excess: 2.5   /  SaO2: 98.3                REVIEW OF SYSTEMS:  CONSTITUTIONAL: No fever, weight loss, or fatigue  EYES: No eye pain, visual disturbances, or discharge  ENMT:  No difficulty hearing, tinnitus, vertigo; No sinus or throat pain  NECK: No pain or stiffness  RESPIRATORY: No cough, wheezing, chills or hemoptysis; No shortness of breath  CARDIOVASCULAR: No chest pain, palpitations, dizziness, or leg swelling  GASTROINTESTINAL: No abdominal or epigastric pain. No nausea, vomiting, or hematemesis; No diarrhea or constipation. No melena or hematochezia.  GENITOURINARY: No dysuria, frequency, hematuria, or incontinence  NEUROLOGICAL: No headaches, memory loss, loss of strength, numbness, or tremors  SKIN: No itching, burning, rashes, or lesions   ENDOCRINE: No heat or cold intolerance; No hair loss  MUSCULOSKELETAL: No joint pain or swelling; No muscle, back, or extremity pain  PSYCHIATRIC: No depression, anxiety, mood swings, or difficulty sleeping  HEME/LYMPH: No easy bruising, or bleeding gums  ALLERY AND IMMUNOLOGIC: No hives or eczema    RADIOLOGY & ADDITIONAL TESTS:    Consultant(s) Notes Reviewed:  [x ] YES  [ ] NO    PHYSICAL EXAM:  GENERAL: NAD, well-groomed, well-developed,not in any distress ,  HEAD:  Atraumatic, Normocephalic  EYES: EOMI, PERRLA, conjunctiva and sclera clear  ENMT: No tonsillar erythema, exudates, or enlargement; Moist mucous membranes, Good dentition, No lesions  NECK: Supple, No JVD, Normal thyroid  NERVOUS SYSTEM:  Alert & Oriented X3, No focal deficit   CHEST/LUNG: Good air entry bilateral with no  rales, rhonchi, wheezing, or rubs  HEART: Regular rate and rhythm; No murmurs, rubs, or gallops  ABDOMEN: Soft, Nontender, Nondistended; Bowel sounds present  EXTREMITIES:  2+ Peripheral Pulses, No clubbing, cyanosis, or edema  SKIN: No rashes or lesions    Care Discussed with Consultants/Other Providers [ x] YES  [ ] NO Date of Service  : 01-17-23 @ 20:01    INTERVAL HPI/OVERNIGHT EVENTS: S/P Surgery   Vital Signs Last 24 Hrs  T(C): 2.1 (17 Jan 2023 20:00), Max: 36.9 (17 Jan 2023 04:16)  T(F): 35.9 (17 Jan 2023 20:00), Max: 98.4 (17 Jan 2023 04:16)  HR: 72 (17 Jan 2023 19:00) (67 - 72)  BP: 127/83 (17 Jan 2023 08:04) (127/83 - 127/83)  BP(mean): 98 (17 Jan 2023 08:04) (98 - 98)  RR: 12 (17 Jan 2023 19:00) (12 - 18)  SpO2: 100% (17 Jan 2023 19:00) (95% - 100%)    Parameters below as of 17 Jan 2023 20:00  Patient On (Oxygen Delivery Method): ventilator    O2 Concentration (%): 50  I&O's Summary    16 Jan 2023 07:01  -  17 Jan 2023 07:00  --------------------------------------------------------  IN: 1330 mL / OUT: 0 mL / NET: 1330 mL    17 Jan 2023 07:01  -  17 Jan 2023 20:01  --------------------------------------------------------  IN: 874 mL / OUT: 325 mL / NET: 549 mL      MEDICATIONS  (STANDING):  acetaminophen     Tablet .. 650 milliGRAM(s) Oral every 6 hours  aMIOdarone    Tablet 400 milliGRAM(s) Oral two times a day  ascorbic acid 500 milliGRAM(s) Oral two times a day  aspirin enteric coated 325 milliGRAM(s) Oral daily  aspirin Suppository 300 milliGRAM(s) Rectal once  cefuroxime  IVPB 1500 milliGRAM(s) IV Intermittent every 8 hours  chlorhexidine 0.12% Liquid 15 milliLiter(s) Oral Mucosa every 12 hours  chlorhexidine 0.12% Liquid 5 milliLiter(s) Oral Mucosa two times a day  chlorhexidine 2% Cloths 1 Application(s) Topical daily  dexMEDEtomidine Infusion 0.3 MICROgram(s)/kG/Hr (8.55 mL/Hr) IV Continuous <Continuous>  dextrose 50% Injectable 50 milliLiter(s) IV Push every 15 minutes  dextrose 50% Injectable 25 milliLiter(s) IV Push every 15 minutes  gabapentin 100 milliGRAM(s) Oral every 8 hours  insulin regular Infusion 3 Unit(s)/Hr (3 mL/Hr) IV Continuous <Continuous>  meperidine     Injectable 25 milliGRAM(s) IV Push once  norepinephrine Infusion 0.05 MICROgram(s)/kG/Min (10.7 mL/Hr) IV Continuous <Continuous>  pantoprazole  Injectable 40 milliGRAM(s) IV Push daily  potassium chloride  10 mEq/50 mL IVPB 10 milliEquivalent(s) IV Intermittent every 1 hour  potassium chloride  10 mEq/50 mL IVPB 10 milliEquivalent(s) IV Intermittent every 1 hour  potassium chloride  10 mEq/50 mL IVPB 10 milliEquivalent(s) IV Intermittent every 1 hour  sodium chloride 0.9%. 1000 milliLiter(s) (10 mL/Hr) IV Continuous <Continuous>    MEDICATIONS  (PRN):  HYDROmorphone  Injectable 0.5 milliGRAM(s) IV Push every 6 hours PRN Breakthrough Pain  oxyCODONE    IR 5 milliGRAM(s) Oral every 4 hours PRN Moderate Pain (4 - 6)  oxyCODONE    IR 10 milliGRAM(s) Oral every 4 hours PRN Severe Pain (7 - 10)    LABS:                        8.6    11.61 )-----------( 203      ( 17 Jan 2023 18:02 )             26.1     01-17    139  |  104  |  11  ----------------------------<  159<H>  4.4   |  26  |  0.64    Ca    9.2      17 Jan 2023 18:03  Phos  3.6     01-17  Mg     1.8     01-17    TPro  5.7<L>  /  Alb  3.6  /  TBili  0.5  /  DBili  x   /  AST  72<H>  /  ALT  46<H>  /  AlkPhos  54  01-17    PT/INR - ( 17 Jan 2023 18:02 )   PT: 15.0 sec;   INR: 1.29 ratio         PTT - ( 17 Jan 2023 18:02 )  PTT:28.7 sec    CAPILLARY BLOOD GLUCOSE      POCT Blood Glucose.: 158 mg/dL (17 Jan 2023 19:59)  POCT Blood Glucose.: 141 mg/dL (17 Jan 2023 06:49)  POCT Blood Glucose.: 155 mg/dL (16 Jan 2023 21:28)      ABG - ( 17 Jan 2023 18:55 )  pH, Arterial: 7.42  pH, Blood: x     /  pCO2: 42    /  pO2: 134   / HCO3: 27    / Base Excess: 2.5   /  SaO2: 98.3                    Consultant(s) Notes Reviewed:  [x ] YES  [ ] NO    PHYSICAL EXAM:  GENERAL: Intubated.   HEAD:  Atraumatic, Normocephalic  NECK: Supple, No JVD, Normal thyroid  NERVOUS SYSTEM: Intubated.   CHEST/LUNG: Good air entry with CT+  HEART: Regular rate and rhythm; No murmurs, rubs, or gallops  ABDOMEN: Soft, Nontender, Nondistended; Bowel sounds present    Care Discussed with Consultants/Other Providers [ x] YES  [ ] NO

## 2023-01-17 NOTE — PROGRESS NOTE ADULT - SUBJECTIVE AND OBJECTIVE BOX
Patient seen and examined at the bedside.    Remained critically ill on continuous ICU monitoring.    OBJECTIVE:  Vital Signs Last 24 Hrs  T(C): 2.1 (17 Jan 2023 20:00), Max: 36.9 (17 Jan 2023 04:16)  T(F): 35.9 (17 Jan 2023 20:00), Max: 98.4 (17 Jan 2023 04:16)  HR: 72 (17 Jan 2023 19:00) (67 - 72)  BP: 127/83 (17 Jan 2023 08:04) (127/83 - 127/83)  BP(mean): 98 (17 Jan 2023 08:04) (98 - 98)  RR: 12 (17 Jan 2023 19:00) (12 - 18)  SpO2: 100% (17 Jan 2023 19:00) (95% - 100%)    Parameters below as of 17 Jan 2023 20:00  Patient On (Oxygen Delivery Method): ventilator    O2 Concentration (%): 50      Physical Exam:   General: intubated, multiple lines and gtts  Neurology: off sedation  Eyes: bilateral pupils equal and reactive   ENT/Neck: +ETT midline Neck supple, trachea midline, No JVD   Respiratory: Clear bilaterally   CV: S1S2, no murmurs        [x] Sternal dressing, [x] Mediastinal CT x2, [x] Pleural CT        [x] Sinus rhythm, [x] Temporary pacing  Abdominal: Soft, NT, ND +BS   Extremities: 1-2+ pedal edema noted, + peripheral pulses   Skin: No Rashes, Hematoma, Ecchymosis                           ============================I/O===========================   I&O's Detail    16 Jan 2023 07:01  -  17 Jan 2023 07:00  --------------------------------------------------------  IN:    Oral Fluid: 1330 mL  Total IN: 1330 mL    OUT:  Total OUT: 0 mL    Total NET: 1330 mL      17 Jan 2023 07:01  -  17 Jan 2023 19:43  --------------------------------------------------------  IN:    Insulin: 4 mL    IV PiggyBack: 50 mL    Lactated Ringers Bolus: 800 mL    sodium chloride 0.9%: 20 mL  Total IN: 874 mL    OUT:    Chest Tube (mL): 0 mL    Chest Tube (mL): 20 mL    Chest Tube (mL): 35 mL    Dexmedetomidine: 0 mL    Indwelling Catheter - Urethral (mL): 270 mL    Norepinephrine: 0 mL  Total OUT: 325 mL    Total NET: 549 mL        ============================ LABS =========================                        8.6    11.61 )-----------( 203      ( 17 Jan 2023 18:02 )             26.1     01-17    139  |  104  |  11  ----------------------------<  159<H>  4.4   |  26  |  0.64    Ca    9.2      17 Jan 2023 18:03  Phos  3.6     01-17  Mg     1.8     01-17    TPro  5.7<L>  /  Alb  3.6  /  TBili  0.5  /  DBili  x   /  AST  72<H>  /  ALT  46<H>  /  AlkPhos  54  01-17    LIVER FUNCTIONS - ( 17 Jan 2023 18:03 )  Alb: 3.6 g/dL / Pro: 5.7 g/dL / ALK PHOS: 54 U/L / ALT: 46 U/L / AST: 72 U/L / GGT: x           PT/INR - ( 17 Jan 2023 18:02 )   PT: 15.0 sec;   INR: 1.29 ratio         PTT - ( 17 Jan 2023 18:02 )  PTT:28.7 sec  ABG - ( 17 Jan 2023 18:55 )  pH, Arterial: 7.42  pH, Blood: x     /  pCO2: 42    /  pO2: 134   / HCO3: 27    / Base Excess: 2.5   /  SaO2: 98.3                ======================Micro/Rad/Cardio=================  Culture: Reviewed   CXR: Reviewed  Echo: Reviewed  ======================================================  PAST MEDICAL & SURGICAL HISTORY:  Diabetes      HTN (Hypertension)      Hyperlipidemia      Bipolar Disorder      History of Seizure Disorder  patient denies      MI, old      Stented coronary artery  RCA      Sleep apnea  cannot tolerate CPAP      Triple vessel coronary artery disease      SHIN (obstructive sleep apnea)  s/p surgery (uvula ressection), cannot tolerate CPAP          ======================================================    Assessment:  Patient is a 57M /w Penicillin allergy, PMH  HTN, HLD, DM2, bipolar disorder, SHIN s/p uvula resection not on CPAP,  known CAD with MI with RCA/OM stents 2013 who presented to his Cardiologist complaining of progressive SOB/LEMOS over the last 4 weeks.  Denies having typical chest pain type symptoms.  Had abnormal stress test and underwent elective cath yesterday on 1/11/23 which revealed EF 65, pLAD 90, mLAD 20, dLAD 80, OM 99, mRCA 95 via RRA access.  Now transferred to [facility] for CABG eval /w Dr. Barrientos.  Currently patient denies chest pain/sob.  No headache/dizziness.  No abdominal pain/nausea/vomiting.  No bowel/urinary symptoms.  No lower extremity pain/numbness/tingling.  No tooth pain.  Patient states he typically ambulates independently without use of assistive devices and lives with his wife in [place].      CAD s/p CABG on 1/17/23  Hemodynamic instability  Hypovolemia  Post op respiratory insufficiency  Acute blood loss anemia    ======================================================  Plan:   ***Neuro***  [x] Sedated with [x] Precedex - currently off  Post operative neuro assessment   Tylenol, Gabapentin, PRN Dilaudid, and PRN Oxycodone for analgesia     ***Cardiovascular***  Invasive hemodynamic monitoring, assess perfusion indices   SR / CVP 3/ MAP 88/ Hct 26.1/ Lactate 1.3  [x] Levophed currently off  Continuos reassessment of hemodynamics   A fib prophylaxis with PO Amiodarone  Monitor chest tube outputs   [x]  ASA  Serial EKG and cardiac enzymes     ***Pulmonary***  Post op vent management   Titration of FiO2 and PEEP, follow SpO2, CXR, blood gasses     Mode: AC/ CMV (Assist Control/ Continuous Mandatory Ventilation)  RR (machine): 12  TV (machine): 600  FiO2: 100  PEEP: 5  ITime: 1  MAP: 10  PIP: 26              ***GI***  [x] NPO   [x] Protonix     ***Renal***  Continue to monitor I/Os, BUN/Creatinine.   Replete lytes PRN  Deb present    ***ID***  Perioperative coverage with Cefuroxime     ***Endocrine***  [x]  DM2: HbA1c 8.2%                - [x] Insulin gtt              - Need tight glycemic control to prevent wound infection.        Patient requires continuous monitoring with:  bedside rhythm monitoring, arterial line, pulse oximetry, ventilator management and monitoring; intermittent blood gas analysis.  Care plan discussed with ICU care team.  patient remain critical; required more than usual post op care; I have spent 35 minutes providing non routine post op care, revaluated multiple times during the day .     Care Plan discussed with the ICU care team. Patient remained critical, at risk for life threatening decompensation.     By signing my name below, I, Maria D'Amico, attest that this documentation has been prepared under the direction and in the presence of Adiel Weems MD.  Electronically signed: Maria D'Amico, Scribe, 01-17-23 @ 19:43    IFaustina, personally performed the services described in this documentation. all medical record entries made by the scribe were at my direction and in my presence. I have reviewed the chart and agree that the record reflects my personal performance and is accurate and complete  Electronically signed: Adiel Weems MD. Patient seen and examined at the bedside.    Remained critically ill on continuous ICU monitoring.    OBJECTIVE:  Vital Signs Last 24 Hrs  T(C): 2.1 (17 Jan 2023 20:00), Max: 36.9 (17 Jan 2023 04:16)  T(F): 35.9 (17 Jan 2023 20:00), Max: 98.4 (17 Jan 2023 04:16)  HR: 72 (17 Jan 2023 19:00) (67 - 72)  BP: 127/83 (17 Jan 2023 08:04) (127/83 - 127/83)  BP(mean): 98 (17 Jan 2023 08:04) (98 - 98)  RR: 12 (17 Jan 2023 19:00) (12 - 18)  SpO2: 100% (17 Jan 2023 19:00) (95% - 100%)    Parameters below as of 17 Jan 2023 20:00  Patient On (Oxygen Delivery Method): ventilator    O2 Concentration (%): 50      Physical Exam:   General: intubated, multiple lines and gtts  Neurology: off sedation  Eyes: bilateral pupils equal and reactive   ENT/Neck: +ETT midline Neck supple, trachea midline, No JVD   Respiratory: Clear bilaterally   CV: S1S2, no murmurs        [x] Sternal dressing, [x] Mediastinal CT x2, [x] Pleural CT        [x] Sinus rhythm, [x] Temporary pacing  Abdominal: Soft, NT, ND +BS   Extremities: 1-2+ pedal edema noted, + peripheral pulses   Skin: No Rashes, Hematoma, Ecchymosis                           ============================I/O===========================   I&O's Detail    16 Jan 2023 07:01  -  17 Jan 2023 07:00  --------------------------------------------------------  IN:    Oral Fluid: 1330 mL  Total IN: 1330 mL    OUT:  Total OUT: 0 mL    Total NET: 1330 mL      17 Jan 2023 07:01  -  17 Jan 2023 19:43  --------------------------------------------------------  IN:    Insulin: 4 mL    IV PiggyBack: 50 mL    Lactated Ringers Bolus: 800 mL    sodium chloride 0.9%: 20 mL  Total IN: 874 mL    OUT:    Chest Tube (mL): 0 mL    Chest Tube (mL): 20 mL    Chest Tube (mL): 35 mL    Dexmedetomidine: 0 mL    Indwelling Catheter - Urethral (mL): 270 mL    Norepinephrine: 0 mL  Total OUT: 325 mL    Total NET: 549 mL        ============================ LABS =========================                        8.6    11.61 )-----------( 203      ( 17 Jan 2023 18:02 )             26.1     01-17    139  |  104  |  11  ----------------------------<  159<H>  4.4   |  26  |  0.64    Ca    9.2      17 Jan 2023 18:03  Phos  3.6     01-17  Mg     1.8     01-17    TPro  5.7<L>  /  Alb  3.6  /  TBili  0.5  /  DBili  x   /  AST  72<H>  /  ALT  46<H>  /  AlkPhos  54  01-17    LIVER FUNCTIONS - ( 17 Jan 2023 18:03 )  Alb: 3.6 g/dL / Pro: 5.7 g/dL / ALK PHOS: 54 U/L / ALT: 46 U/L / AST: 72 U/L / GGT: x           PT/INR - ( 17 Jan 2023 18:02 )   PT: 15.0 sec;   INR: 1.29 ratio         PTT - ( 17 Jan 2023 18:02 )  PTT:28.7 sec  ABG - ( 17 Jan 2023 18:55 )  pH, Arterial: 7.42  pH, Blood: x     /  pCO2: 42    /  pO2: 134   / HCO3: 27    / Base Excess: 2.5   /  SaO2: 98.3                ======================Micro/Rad/Cardio=================  Culture: Reviewed   CXR: Reviewed  Echo: Reviewed  ======================================================  PAST MEDICAL & SURGICAL HISTORY:  Diabetes      HTN (Hypertension)      Hyperlipidemia      Bipolar Disorder      History of Seizure Disorder  patient denies      MI, old      Stented coronary artery  RCA      Sleep apnea  cannot tolerate CPAP      Triple vessel coronary artery disease      SHIN (obstructive sleep apnea)  s/p surgery (uvula ressection), cannot tolerate CPAP          ======================================================    Assessment:  Patient is a 57M /w Penicillin allergy, PMH  HTN, HLD, DM2, bipolar disorder, SHIN s/p uvula resection not on CPAP,  known CAD with MI with RCA/OM stents 2013 who presented to his Cardiologist complaining of progressive SOB/LEMOS over the last 4 weeks.  Denies having typical chest pain type symptoms.  Had abnormal stress test and underwent elective cath yesterday on 1/11/23 which revealed EF 65, pLAD 90, mLAD 20, dLAD 80, OM 99, mRCA 95 via RRA access.  Now transferred to [facility] for CABG eval /w Dr. Barrientos.  Currently patient denies chest pain/sob.  No headache/dizziness.  No abdominal pain/nausea/vomiting.  No bowel/urinary symptoms.  No lower extremity pain/numbness/tingling.  No tooth pain.  Patient states he typically ambulates independently without use of assistive devices and lives with his wife in [place].      CAD s/p CABG on 1/17/23  Hemodynamic instability  Hypovolemia  Post op respiratory insufficiency  Acute blood loss anemia    ======================================================  Plan:   ***Neuro***  [x] Sedated with [x] Precedex - currently off  Post operative neuro assessment   Tylenol, Gabapentin, PRN Dilaudid, and PRN Oxycodone for analgesia     ***Cardiovascular***  Invasive hemodynamic monitoring, assess perfusion indices   SR / CVP 3/ MAP 88/ Hct 26.1/ Lactate 1.3  [x] Levophed currently off  Continuos reassessment of hemodynamics   A fib prophylaxis with PO Amiodarone  Monitor chest tube outputs   [x]  ASA  Serial EKG and cardiac enzymes     ***Pulmonary***  Post op vent management   Titration of FiO2 and PEEP, follow SpO2, CXR, blood gasses     Mode: AC/ CMV (Assist Control/ Continuous Mandatory Ventilation)  RR (machine): 12  TV (machine): 600  FiO2: 100  PEEP: 5  ITime: 1  MAP: 10  PIP: 26  wean to extubate              ***GI***  [x] NPO   [x] Protonix     ***Renal***  Continue to monitor I/Os, BUN/Creatinine.   Replete lytes PRN  Deb present    ***ID***  Perioperative coverage with Cefuroxime     ***Endocrine***  [x]  DM2: HbA1c 8.2%                - [x] Insulin gtt              - Need tight glycemic control to prevent wound infection.        Patient requires continuous monitoring with:  bedside rhythm monitoring, arterial line, pulse oximetry, ventilator management and monitoring; intermittent blood gas analysis.  Care plan discussed with ICU care team.  patient remain critical; required more than usual post op care; I have spent 35 minutes providing non routine post op care, revaluated multiple times during the day .     Care Plan discussed with the ICU care team. Patient remained critical, at risk for life threatening decompensation.     By signing my name below, I, Maria D'Amico, attest that this documentation has been prepared under the direction and in the presence of Adiel Weems MD.  Electronically signed: Maria D'Amico, Scribe, 01-17-23 @ 19:43    IFaustina, personally performed the services described in this documentation. all medical record entries made by the scribe were at my direction and in my presence. I have reviewed the chart and agree that the record reflects my personal performance and is accurate and complete  Electronically signed: Adiel Weems MD.

## 2023-01-18 LAB
ALBUMIN SERPL ELPH-MCNC: 3.6 G/DL — SIGNIFICANT CHANGE UP (ref 3.3–5)
ALP SERPL-CCNC: 51 U/L — SIGNIFICANT CHANGE UP (ref 40–120)
ALT FLD-CCNC: 44 U/L — SIGNIFICANT CHANGE UP (ref 10–45)
ANION GAP SERPL CALC-SCNC: 10 MMOL/L — SIGNIFICANT CHANGE UP (ref 5–17)
AST SERPL-CCNC: 80 U/L — HIGH (ref 10–40)
BASOPHILS # BLD AUTO: 0.01 K/UL — SIGNIFICANT CHANGE UP (ref 0–0.2)
BASOPHILS NFR BLD AUTO: 0.1 % — SIGNIFICANT CHANGE UP (ref 0–2)
BILIRUB SERPL-MCNC: 0.4 MG/DL — SIGNIFICANT CHANGE UP (ref 0.2–1.2)
BUN SERPL-MCNC: 10 MG/DL — SIGNIFICANT CHANGE UP (ref 7–23)
CALCIUM SERPL-MCNC: 8.7 MG/DL — SIGNIFICANT CHANGE UP (ref 8.4–10.5)
CHLORIDE SERPL-SCNC: 105 MMOL/L — SIGNIFICANT CHANGE UP (ref 96–108)
CO2 SERPL-SCNC: 25 MMOL/L — SIGNIFICANT CHANGE UP (ref 22–31)
CREAT SERPL-MCNC: 0.57 MG/DL — SIGNIFICANT CHANGE UP (ref 0.5–1.3)
EGFR: 114 ML/MIN/1.73M2 — SIGNIFICANT CHANGE UP
EOSINOPHIL # BLD AUTO: 0.01 K/UL — SIGNIFICANT CHANGE UP (ref 0–0.5)
EOSINOPHIL NFR BLD AUTO: 0.1 % — SIGNIFICANT CHANGE UP (ref 0–6)
GAS PNL BLDA: SIGNIFICANT CHANGE UP
GLUCOSE BLDC GLUCOMTR-MCNC: 111 MG/DL — HIGH (ref 70–99)
GLUCOSE BLDC GLUCOMTR-MCNC: 116 MG/DL — HIGH (ref 70–99)
GLUCOSE BLDC GLUCOMTR-MCNC: 117 MG/DL — HIGH (ref 70–99)
GLUCOSE BLDC GLUCOMTR-MCNC: 118 MG/DL — HIGH (ref 70–99)
GLUCOSE BLDC GLUCOMTR-MCNC: 120 MG/DL — HIGH (ref 70–99)
GLUCOSE BLDC GLUCOMTR-MCNC: 121 MG/DL — HIGH (ref 70–99)
GLUCOSE BLDC GLUCOMTR-MCNC: 121 MG/DL — HIGH (ref 70–99)
GLUCOSE BLDC GLUCOMTR-MCNC: 122 MG/DL — HIGH (ref 70–99)
GLUCOSE BLDC GLUCOMTR-MCNC: 125 MG/DL — HIGH (ref 70–99)
GLUCOSE BLDC GLUCOMTR-MCNC: 126 MG/DL — HIGH (ref 70–99)
GLUCOSE BLDC GLUCOMTR-MCNC: 127 MG/DL — HIGH (ref 70–99)
GLUCOSE BLDC GLUCOMTR-MCNC: 127 MG/DL — HIGH (ref 70–99)
GLUCOSE BLDC GLUCOMTR-MCNC: 133 MG/DL — HIGH (ref 70–99)
GLUCOSE BLDC GLUCOMTR-MCNC: 133 MG/DL — HIGH (ref 70–99)
GLUCOSE BLDC GLUCOMTR-MCNC: 136 MG/DL — HIGH (ref 70–99)
GLUCOSE BLDC GLUCOMTR-MCNC: 137 MG/DL — HIGH (ref 70–99)
GLUCOSE BLDC GLUCOMTR-MCNC: 137 MG/DL — HIGH (ref 70–99)
GLUCOSE BLDC GLUCOMTR-MCNC: 138 MG/DL — HIGH (ref 70–99)
GLUCOSE BLDC GLUCOMTR-MCNC: 138 MG/DL — HIGH (ref 70–99)
GLUCOSE BLDC GLUCOMTR-MCNC: 142 MG/DL — HIGH (ref 70–99)
GLUCOSE BLDC GLUCOMTR-MCNC: 147 MG/DL — HIGH (ref 70–99)
GLUCOSE SERPL-MCNC: 139 MG/DL — HIGH (ref 70–99)
HCT VFR BLD CALC: 24.5 % — LOW (ref 39–50)
HGB BLD-MCNC: 7.9 G/DL — LOW (ref 13–17)
IMM GRANULOCYTES NFR BLD AUTO: 0.9 % — SIGNIFICANT CHANGE UP (ref 0–0.9)
LYMPHOCYTES # BLD AUTO: 0.96 K/UL — LOW (ref 1–3.3)
LYMPHOCYTES # BLD AUTO: 12.6 % — LOW (ref 13–44)
MAGNESIUM SERPL-MCNC: 1.7 MG/DL — SIGNIFICANT CHANGE UP (ref 1.6–2.6)
MCHC RBC-ENTMCNC: 27.6 PG — SIGNIFICANT CHANGE UP (ref 27–34)
MCHC RBC-ENTMCNC: 32.2 GM/DL — SIGNIFICANT CHANGE UP (ref 32–36)
MCV RBC AUTO: 85.7 FL — SIGNIFICANT CHANGE UP (ref 80–100)
MONOCYTES # BLD AUTO: 0.87 K/UL — SIGNIFICANT CHANGE UP (ref 0–0.9)
MONOCYTES NFR BLD AUTO: 11.4 % — SIGNIFICANT CHANGE UP (ref 2–14)
NEUTROPHILS # BLD AUTO: 5.68 K/UL — SIGNIFICANT CHANGE UP (ref 1.8–7.4)
NEUTROPHILS NFR BLD AUTO: 74.9 % — SIGNIFICANT CHANGE UP (ref 43–77)
NRBC # BLD: 0 /100 WBCS — SIGNIFICANT CHANGE UP (ref 0–0)
PHOSPHATE SERPL-MCNC: 3.3 MG/DL — SIGNIFICANT CHANGE UP (ref 2.5–4.5)
PLATELET # BLD AUTO: 192 K/UL — SIGNIFICANT CHANGE UP (ref 150–400)
POTASSIUM SERPL-MCNC: 4 MMOL/L — SIGNIFICANT CHANGE UP (ref 3.5–5.3)
POTASSIUM SERPL-SCNC: 4 MMOL/L — SIGNIFICANT CHANGE UP (ref 3.5–5.3)
PROT SERPL-MCNC: 5.5 G/DL — LOW (ref 6–8.3)
RBC # BLD: 2.86 M/UL — LOW (ref 4.2–5.8)
RBC # FLD: 14.6 % — HIGH (ref 10.3–14.5)
SODIUM SERPL-SCNC: 140 MMOL/L — SIGNIFICANT CHANGE UP (ref 135–145)
WBC # BLD: 7.6 K/UL — SIGNIFICANT CHANGE UP (ref 3.8–10.5)
WBC # FLD AUTO: 7.6 K/UL — SIGNIFICANT CHANGE UP (ref 3.8–10.5)

## 2023-01-18 PROCEDURE — 99291 CRITICAL CARE FIRST HOUR: CPT

## 2023-01-18 PROCEDURE — 71045 X-RAY EXAM CHEST 1 VIEW: CPT | Mod: 26

## 2023-01-18 RX ORDER — FUROSEMIDE 40 MG
20 TABLET ORAL ONCE
Refills: 0 | Status: COMPLETED | OUTPATIENT
Start: 2023-01-18 | End: 2023-01-18

## 2023-01-18 RX ORDER — HYDRALAZINE HCL 50 MG
5 TABLET ORAL ONCE
Refills: 0 | Status: COMPLETED | OUTPATIENT
Start: 2023-01-18 | End: 2023-01-18

## 2023-01-18 RX ORDER — ATORVASTATIN CALCIUM 80 MG/1
80 TABLET, FILM COATED ORAL AT BEDTIME
Refills: 0 | Status: DISCONTINUED | OUTPATIENT
Start: 2023-01-18 | End: 2023-01-23

## 2023-01-18 RX ORDER — METOPROLOL TARTRATE 50 MG
25 TABLET ORAL EVERY 8 HOURS
Refills: 0 | Status: DISCONTINUED | OUTPATIENT
Start: 2023-01-18 | End: 2023-01-19

## 2023-01-18 RX ORDER — METOPROLOL TARTRATE 50 MG
25 TABLET ORAL
Refills: 0 | Status: DISCONTINUED | OUTPATIENT
Start: 2023-01-18 | End: 2023-01-18

## 2023-01-18 RX ORDER — MAGNESIUM SULFATE 500 MG/ML
2 VIAL (ML) INJECTION ONCE
Refills: 0 | Status: COMPLETED | OUTPATIENT
Start: 2023-01-18 | End: 2023-01-18

## 2023-01-18 RX ORDER — HYDROMORPHONE HYDROCHLORIDE 2 MG/ML
0.5 INJECTION INTRAMUSCULAR; INTRAVENOUS; SUBCUTANEOUS ONCE
Refills: 0 | Status: DISCONTINUED | OUTPATIENT
Start: 2023-01-18 | End: 2023-01-18

## 2023-01-18 RX ORDER — ACETAMINOPHEN 500 MG
1000 TABLET ORAL ONCE
Refills: 0 | Status: COMPLETED | OUTPATIENT
Start: 2023-01-18 | End: 2023-01-18

## 2023-01-18 RX ORDER — DIVALPROEX SODIUM 500 MG/1
500 TABLET, DELAYED RELEASE ORAL AT BEDTIME
Refills: 0 | Status: DISCONTINUED | OUTPATIENT
Start: 2023-01-18 | End: 2023-01-19

## 2023-01-18 RX ORDER — ENOXAPARIN SODIUM 100 MG/ML
30 INJECTION SUBCUTANEOUS EVERY 12 HOURS
Refills: 0 | Status: DISCONTINUED | OUTPATIENT
Start: 2023-01-19 | End: 2023-01-23

## 2023-01-18 RX ORDER — ASPIRIN/CALCIUM CARB/MAGNESIUM 324 MG
300 TABLET ORAL ONCE
Refills: 0 | Status: COMPLETED | OUTPATIENT
Start: 2023-01-18 | End: 2023-01-18

## 2023-01-18 RX ORDER — PANTOPRAZOLE SODIUM 20 MG/1
40 TABLET, DELAYED RELEASE ORAL
Refills: 0 | Status: DISCONTINUED | OUTPATIENT
Start: 2023-01-18 | End: 2023-01-23

## 2023-01-18 RX ORDER — METOPROLOL TARTRATE 50 MG
5 TABLET ORAL ONCE
Refills: 0 | Status: COMPLETED | OUTPATIENT
Start: 2023-01-18 | End: 2023-01-18

## 2023-01-18 RX ORDER — AMLODIPINE BESYLATE 2.5 MG/1
10 TABLET ORAL DAILY
Refills: 0 | Status: DISCONTINUED | OUTPATIENT
Start: 2023-01-18 | End: 2023-01-20

## 2023-01-18 RX ORDER — ALBUMIN HUMAN 25 %
250 VIAL (ML) INTRAVENOUS ONCE
Refills: 0 | Status: COMPLETED | OUTPATIENT
Start: 2023-01-18 | End: 2023-01-18

## 2023-01-18 RX ORDER — IRON SUCROSE 20 MG/ML
100 INJECTION, SOLUTION INTRAVENOUS EVERY 24 HOURS
Refills: 0 | Status: COMPLETED | OUTPATIENT
Start: 2023-01-18 | End: 2023-01-20

## 2023-01-18 RX ORDER — ENOXAPARIN SODIUM 100 MG/ML
40 INJECTION SUBCUTANEOUS EVERY 24 HOURS
Refills: 0 | Status: DISCONTINUED | OUTPATIENT
Start: 2023-01-18 | End: 2023-01-18

## 2023-01-18 RX ADMIN — SENNA PLUS 2 TABLET(S): 8.6 TABLET ORAL at 21:43

## 2023-01-18 RX ADMIN — DIVALPROEX SODIUM 500 MILLIGRAM(S): 500 TABLET, DELAYED RELEASE ORAL at 21:43

## 2023-01-18 RX ADMIN — Medication 650 MILLIGRAM(S): at 17:29

## 2023-01-18 RX ADMIN — HYDROMORPHONE HYDROCHLORIDE 0.5 MILLIGRAM(S): 2 INJECTION INTRAMUSCULAR; INTRAVENOUS; SUBCUTANEOUS at 07:15

## 2023-01-18 RX ADMIN — NICARDIPINE HYDROCHLORIDE 25 MG/HR: 30 CAPSULE, EXTENDED RELEASE ORAL at 06:23

## 2023-01-18 RX ADMIN — HYDROMORPHONE HYDROCHLORIDE 0.5 MILLIGRAM(S): 2 INJECTION INTRAMUSCULAR; INTRAVENOUS; SUBCUTANEOUS at 18:39

## 2023-01-18 RX ADMIN — Medication 500 MILLIGRAM(S): at 06:14

## 2023-01-18 RX ADMIN — Medication 650 MILLIGRAM(S): at 17:40

## 2023-01-18 RX ADMIN — Medication 650 MILLIGRAM(S): at 11:47

## 2023-01-18 RX ADMIN — Medication 25 MILLIGRAM(S): at 21:43

## 2023-01-18 RX ADMIN — HYDROMORPHONE HYDROCHLORIDE 0.5 MILLIGRAM(S): 2 INJECTION INTRAMUSCULAR; INTRAVENOUS; SUBCUTANEOUS at 11:46

## 2023-01-18 RX ADMIN — Medication 650 MILLIGRAM(S): at 06:14

## 2023-01-18 RX ADMIN — Medication 25 GRAM(S): at 02:33

## 2023-01-18 RX ADMIN — Medication 100 MILLIGRAM(S): at 09:08

## 2023-01-18 RX ADMIN — Medication 100 MILLIGRAM(S): at 16:59

## 2023-01-18 RX ADMIN — OXYCODONE HYDROCHLORIDE 5 MILLIGRAM(S): 5 TABLET ORAL at 16:30

## 2023-01-18 RX ADMIN — PANTOPRAZOLE SODIUM 40 MILLIGRAM(S): 20 TABLET, DELAYED RELEASE ORAL at 06:14

## 2023-01-18 RX ADMIN — POLYETHYLENE GLYCOL 3350 17 GRAM(S): 17 POWDER, FOR SOLUTION ORAL at 11:47

## 2023-01-18 RX ADMIN — Medication 400 MILLIGRAM(S): at 08:30

## 2023-01-18 RX ADMIN — AMLODIPINE BESYLATE 10 MILLIGRAM(S): 2.5 TABLET ORAL at 20:51

## 2023-01-18 RX ADMIN — OXYCODONE HYDROCHLORIDE 5 MILLIGRAM(S): 5 TABLET ORAL at 20:51

## 2023-01-18 RX ADMIN — HYDROMORPHONE HYDROCHLORIDE 0.5 MILLIGRAM(S): 2 INJECTION INTRAMUSCULAR; INTRAVENOUS; SUBCUTANEOUS at 06:45

## 2023-01-18 RX ADMIN — Medication 5 MILLIGRAM(S): at 06:55

## 2023-01-18 RX ADMIN — GABAPENTIN 100 MILLIGRAM(S): 400 CAPSULE ORAL at 21:43

## 2023-01-18 RX ADMIN — HYDROMORPHONE HYDROCHLORIDE 0.5 MILLIGRAM(S): 2 INJECTION INTRAMUSCULAR; INTRAVENOUS; SUBCUTANEOUS at 06:23

## 2023-01-18 RX ADMIN — Medication 125 MILLILITER(S): at 06:23

## 2023-01-18 RX ADMIN — Medication 25 MILLIGRAM(S): at 13:05

## 2023-01-18 RX ADMIN — Medication 300 MILLIGRAM(S): at 02:00

## 2023-01-18 RX ADMIN — GABAPENTIN 100 MILLIGRAM(S): 400 CAPSULE ORAL at 06:14

## 2023-01-18 RX ADMIN — Medication 1000 MILLIGRAM(S): at 03:15

## 2023-01-18 RX ADMIN — Medication 20 MILLIGRAM(S): at 17:28

## 2023-01-18 RX ADMIN — GABAPENTIN 100 MILLIGRAM(S): 400 CAPSULE ORAL at 13:05

## 2023-01-18 RX ADMIN — Medication 5 MILLIGRAM(S): at 14:07

## 2023-01-18 RX ADMIN — Medication 400 MILLIGRAM(S): at 03:00

## 2023-01-18 RX ADMIN — IRON SUCROSE 210 MILLIGRAM(S): 20 INJECTION, SOLUTION INTRAVENOUS at 20:52

## 2023-01-18 RX ADMIN — Medication 650 MILLIGRAM(S): at 11:53

## 2023-01-18 RX ADMIN — Medication 25 MILLIGRAM(S): at 08:27

## 2023-01-18 RX ADMIN — Medication 325 MILLIGRAM(S): at 11:47

## 2023-01-18 RX ADMIN — Medication 5 MILLIGRAM(S): at 20:51

## 2023-01-18 RX ADMIN — CHLORHEXIDINE GLUCONATE 1 APPLICATION(S): 213 SOLUTION TOPICAL at 11:53

## 2023-01-18 RX ADMIN — HYDROMORPHONE HYDROCHLORIDE 0.5 MILLIGRAM(S): 2 INJECTION INTRAMUSCULAR; INTRAVENOUS; SUBCUTANEOUS at 06:55

## 2023-01-18 RX ADMIN — OXYCODONE HYDROCHLORIDE 5 MILLIGRAM(S): 5 TABLET ORAL at 16:18

## 2023-01-18 RX ADMIN — Medication 100 MILLIGRAM(S): at 01:15

## 2023-01-18 RX ADMIN — Medication 1000 MILLIGRAM(S): at 08:39

## 2023-01-18 RX ADMIN — AMIODARONE HYDROCHLORIDE 400 MILLIGRAM(S): 400 TABLET ORAL at 17:28

## 2023-01-18 RX ADMIN — INSULIN HUMAN 3 UNIT(S)/HR: 100 INJECTION, SOLUTION SUBCUTANEOUS at 06:23

## 2023-01-18 RX ADMIN — ENOXAPARIN SODIUM 40 MILLIGRAM(S): 100 INJECTION SUBCUTANEOUS at 08:30

## 2023-01-18 RX ADMIN — HYDROMORPHONE HYDROCHLORIDE 0.5 MILLIGRAM(S): 2 INJECTION INTRAMUSCULAR; INTRAVENOUS; SUBCUTANEOUS at 11:53

## 2023-01-18 RX ADMIN — Medication 500 MILLIGRAM(S): at 17:29

## 2023-01-18 RX ADMIN — ATORVASTATIN CALCIUM 80 MILLIGRAM(S): 80 TABLET, FILM COATED ORAL at 21:43

## 2023-01-18 RX ADMIN — AMIODARONE HYDROCHLORIDE 400 MILLIGRAM(S): 400 TABLET ORAL at 06:14

## 2023-01-18 RX ADMIN — OXYCODONE HYDROCHLORIDE 5 MILLIGRAM(S): 5 TABLET ORAL at 21:39

## 2023-01-18 RX ADMIN — HYDROMORPHONE HYDROCHLORIDE 0.5 MILLIGRAM(S): 2 INJECTION INTRAMUSCULAR; INTRAVENOUS; SUBCUTANEOUS at 18:50

## 2023-01-18 NOTE — PROGRESS NOTE ADULT - SUBJECTIVE AND OBJECTIVE BOX
Cardiovascular Disease Progress Note  Date of service: 23 @ 07:18    Overnight events: No acute events overnight.  Pt in mild distress 2/2 pain s/p CABG.   Otherwise review of systems negative    Objective Findings:  T(C): 37.4 (23 @ 04:00), Max: 37.5 (23 @ 00:00)  HR: 72 (23 @ 07:00) (67 - 105)  BP: 127/83 (23 @ 08:04) (127/83 - 127/83)  RR: 12 (23 @ 07:00) (12 - 34)  SpO2: 96% (23 @ 07:00) (94% - 100%)  Wt(kg): --  Daily Height in cm: 172.72 (2023 08:04)    Daily Weight in k.5 (2023 00:00)      Physical Exam:  Gen: NAD; Patient resting comfortably  HEENT: EOMI, Normocephalic/ atraumatic  CV: RRR, normal S1 + S2, no m/r/g  Lungs:  Normal respiratory effort;  CT  in place.   Abd: soft, non-tender; bowel sounds present  Ext: No edema; warm and well perfused    Telemetry: Sinus     Laboratory Data:                        7.9    7.60  )-----------( 192      ( 2023 00:16 )             24.5         140  |  105  |  10  ----------------------------<  139<H>  4.0   |  25  |  0.57    Ca    8.7      2023 00:16  Phos  3.3       Mg     1.7         TPro  5.5<L>  /  Alb  3.6  /  TBili  0.4  /  DBili  x   /  AST  80<H>  /  ALT  44  /  AlkPhos  51  18    PT/INR - ( 2023 18:02 )   PT: 15.0 sec;   INR: 1.29 ratio         PTT - ( 2023 18:02 )  PTT:28.7 sec  CARDIAC MARKERS ( 2023 18:03 )  x     / x     / 775 U/L / x     / 29.1 ng/mL          Inpatient Medications:  MEDICATIONS  (STANDING):  acetaminophen     Tablet .. 650 milliGRAM(s) Oral every 6 hours  aMIOdarone    Tablet 400 milliGRAM(s) Oral two times a day  ascorbic acid 500 milliGRAM(s) Oral two times a day  aspirin enteric coated 325 milliGRAM(s) Oral daily  atorvastatin 80 milliGRAM(s) Oral at bedtime  cefuroxime  IVPB 1500 milliGRAM(s) IV Intermittent every 8 hours  chlorhexidine 2% Cloths 1 Application(s) Topical daily  gabapentin 100 milliGRAM(s) Oral every 8 hours  insulin regular Infusion 3 Unit(s)/Hr (3 mL/Hr) IV Continuous <Continuous>  niCARdipine Infusion 5 mG/Hr (25 mL/Hr) IV Continuous <Continuous>  pantoprazole    Tablet 40 milliGRAM(s) Oral before breakfast  polyethylene glycol 3350 17 Gram(s) Oral daily  senna 2 Tablet(s) Oral at bedtime  sodium chloride 0.9%. 1000 milliLiter(s) (10 mL/Hr) IV Continuous <Continuous>      Assessment:  57M /w Penicillin allergy, PMH  HTN, HLD, DM2, bipolar disorder, SHIN s/p uvula resection not on CPAP,  known CAD with MI with RCA/OM stents  who presented to his Cardiologist complaining of progressive SOB/LEMOS over the last 4 weeks.    Plan of Care:    #Multivessel CAD  - Noted on [facility] 2023  - s/p CABG x4 (LIMA-LAD, LSVG- PDA, LSVG-OM, LSVG-Diag)  - Tolerated procedure  - Pt remains in CTICU for hemodynamic monitoring  - TTE  shows normal LV systolic function  - Continue ASA, statin  - BB on hold in setting of shock  - Amio for afib ppx    #Cardiogenic shock  - s/p CABG  - Wean pressors as tolerated    #Hypoxic respiratory failure  - s/p CABG  - Pt s/p extubation  - CT in place  - Management as per CTS team      #HLD  - Statin as ordered    #DM  - ISS   - Defer management to primary team                Over 25 minutes spent on total encounter; more than 50% of the visit was spent counseling and/or coordinating care by the attending physician.      Kevin Landrum, DO [facility]  Cardiovascular Disease  [phone]

## 2023-01-18 NOTE — PROGRESS NOTE ADULT - PROBLEM SELECTOR PLAN 1
Continue IV insulin.  Will continue monitoring  blood sugars trend, will Follow up.  Will start basal insulin when appropriate

## 2023-01-18 NOTE — PHYSICAL THERAPY INITIAL EVALUATION ADULT - GENERAL OBSERVATIONS, REHAB EVAL
Rec'd seated in bedside chair, +HFNC, +chest tube x4, +ICU monitoring, +external pacer, +prevena vac, +RIJ, +watkins.
Patient received walking in hallway with family, NAD, VSS, +PIV capped, +tele, room air, daughter and niece present throughout

## 2023-01-18 NOTE — PHYSICAL THERAPY INITIAL EVALUATION ADULT - PERTINENT HX OF CURRENT PROBLEM, REHAB EVAL
57M /w Penicillin allergy, PMH  HTN, HLD, DM2, bipolar disorder, SHIN s/p uvula resection not on CPAP,  known CAD with MI with RCA/OM stents 2013 who presented to his Cardiologist complaining of progressive SOB/LEMOS over the last 4 weeks.  Denies having typical chest pain type symptoms.  Had abnormal stress test and underwent elective cath  on 1/11/23 which revealed EF 65, pLAD 90, mLAD 20, dLAD 80, OM 99, mRCA 95 via RRA access.  Now transferred to [facility] for CABG eval with Dr. Barrientos.  1/11/23: Cardiac cath impression: There is mild left main disease. There is severe 3-vessel coronary  artery disease (LAD 90 %,Circumflex 99 % and RCA 95 %).  Recommend CABG eval  1/12/23: Carotid dopplers impression: Bilateral calcified plaque. No significant hemodynamic stenosis of either carotid artery.  Chest xray: IMPRESSION:  Low lung volumes. Clear lungs.
57 y.o. M PMH  HTN, HLD, DM2, bipolar disorder, SHIN s/p uvula resection not on CPAP,  known CAD with MI with RCA/OM stents 2013 who presented to his Cardiologist complaining of progressive SOB/LEMOS over the last 4 weeks.  Denies having typical chest pain type symptoms.  Had abnormal stress test and underwent elective cath  on 1/11/23 which revealed EF 65, pLAD 90, mLAD 20, dLAD 80, OM 99, mRCA 95 via RRA access.  Now transferred to [facility] for CABG eval with Dr. Barrientos.  1/11/23: Cardiac cath impression: There is mild left main disease. There is severe 3-vessel coronary  artery disease (LAD 90 %,Circumflex 99 % and RCA 95 %).  Recommend CABG eval  1/12/23: Carotid dopplers impression: Bilateral calcified plaque. No significant hemodynamic stenosis of either carotid artery.  Chest xray: IMPRESSION:  Low lung volumes. Clear lungs.  Now s/p CABG x4 on 1/17/23.

## 2023-01-18 NOTE — PROGRESS NOTE ADULT - SUBJECTIVE AND OBJECTIVE BOX
Patient seen and examined at the bedside.    Remained critically ill on continuous ICU monitoring.      Brief Summary:  56 yo M with CAD s/p C4L 1/17/23.     24 Hour events:      OBJECTIVE:  Vital Signs Last 24 Hrs  T(C): 37.4 (18 Jan 2023 04:00), Max: 37.5 (18 Jan 2023 00:00)  T(F): 99.3 (18 Jan 2023 04:00), Max: 99.5 (18 Jan 2023 00:00)  HR: 72 (18 Jan 2023 07:00) (67 - 105)  BP: 127/83 (17 Jan 2023 08:04) (127/83 - 127/83)  BP(mean): 98 (17 Jan 2023 08:04) (98 - 98)  RR: 12 (18 Jan 2023 07:00) (12 - 34)  SpO2: 96% (18 Jan 2023 07:00) (94% - 100%)    Parameters below as of 18 Jan 2023 04:00  Patient On (Oxygen Delivery Method): nasal cannula, high flow    Mode: off              Physical Exam:   General: HFNC  Neurology: nonfocal   Eyes: bilateral pupils equal and reactive   ENT/Neck: +ETT midline Neck supple, trachea midline, No JVD   Respiratory: Clear bilaterally   CV: S1S2, no murmurs        [x] Sternal dressing, [x] Mediastinal CT x2, [x] Pleural CT        [x] Sinus rhythm, [x] Temporary pacing  Abdominal: Soft, NT, ND +BS   Extremities: 1-2+ pedal edema noted, + peripheral pulses   Skin: No Rashes, Hematoma, Ecchymosis  -------------------------------------------------------------------------------------------------------------------------------  Labs:                         7.9    7.60  )-----------( 192      ( 18 Jan 2023 00:16 )             24.5     01-18    140  |  105  |  10  ----------------------------<  139<H>  4.0   |  25  |  0.57    Ca    8.7      18 Jan 2023 00:16  Phos  3.3     01-18  Mg     1.7     01-18    TPro  5.5<L>  /  Alb  3.6  /  TBili  0.4  /  DBili  x   /  AST  80<H>  /  ALT  44  /  AlkPhos  51  01-18    LIVER FUNCTIONS - ( 18 Jan 2023 00:16 )  Alb: 3.6 g/dL / Pro: 5.5 g/dL / ALK PHOS: 51 U/L / ALT: 44 U/L / AST: 80 U/L / GGT: x           PT/INR - ( 17 Jan 2023 18:02 )   PT: 15.0 sec;   INR: 1.29 ratio         PTT - ( 17 Jan 2023 18:02 )  PTT:28.7 sec  ABG - ( 18 Jan 2023 04:52 )  pH, Arterial: 7.41  pH, Blood: x     /  pCO2: 41    /  pO2: 88    / HCO3: 26    / Base Excess: 1.2   /  SaO2: 96.4    ------------------------------------------------------------------------------------------------------------------------------  Assessment:  Patient is a 57M /w Penicillin allergy, PMH  HTN, HLD, DM2, bipolar disorder, SHIN s/p uvula resection not on CPAP,  known CAD with MI with RCA/OM stents 2013 who presented to his Cardiologist complaining of progressive SOB/LEMOS over the last 4 weeks.  Denies having typical chest pain type symptoms.  Had abnormal stress test and underwent elective cath yesterday on 1/11/23 which revealed EF 65, pLAD 90, mLAD 20, dLAD 80, OM 99, mRCA 95 via RRA access.  Now transferred to [facility] for CABG eval /w Dr. Barrientos.  Currently patient denies chest pain/sob.  No headache/dizziness.  No abdominal pain/nausea/vomiting.  No bowel/urinary symptoms.  No lower extremity pain/numbness/tingling.  No tooth pain.  Patient states he typically ambulates independently without use of assistive devices and lives with his wife in [place].      CAD s/p CABG on 1/17/23  Hemodynamic instability  Hypovolemia  Post op respiratory insufficiency  Acute blood loss anemia    Plan:   ***Neuro***  Postoperative acute pain control with prns    ***Cardiovascular***  Invasive hemodynamic monitoring, assess perfusion indices   At high risk for hemodynamic instability and cardiac arrhythmias.  IVF for perioperative hypovolemia.  BP management w/ Cardene infusion   lopressor for rate control   Amiodarone for afib prophylaxis   ASA/Stain daily for CAD  Monitor chest tube output.    ***Pulmonary***  Postoperative acute respiratory insufficiency   High flow nasal cannula   Deep breathing and coughing exercises.  Wean oxygen as able.    ***GI***  Tolerating diet   Protonix for stress ulcer prophylaxis   Bowel regimen with Miralax and Senna     ***Renal***  Boyd catheter for strict I/O measurements.   Replete electrolytes as indicated.    ***ID***  Perioperative coverage with Cefuroxime.    ***Endocrine***  Hx of DM2  Insulin as needed to maintain euglycemia.    ***Hematology***  Acute blood loss anemia - no current transfusion indication      Patient requires continuous monitoring with bedside rhythm monitoring, pulse oximetry monitoring, and continuous central venous and arterial pressure monitoring; and intermittent blood gas analysis. Care plan discussed with the ICU care team.   Patient remained critical, at risk for life threatening decompensation.    I have spent 30 minutes providing critical care management to this patient.    By signing my name below, I, Jyoti Junior, attest that this documentation has been prepared under the direction and in the presence of Shani Berg MD  Electronically signed: Blaise Reynaga, 01-18-23 @ 07:10    I, Shani Berg MD, personally performed the services described in this documentation. all medical record entries made by the blaise were at my direction and in my presence. I have reviewed the chart and agree that the record reflects my personal performance and is accurate and complete  Electronically signed: Shani Berg MD Patient seen and examined at the bedside.    Remained critically ill on continuous ICU monitoring.      Brief Summary:  58 yo M with CAD s/p C4L 1/17/23.     24 Hour events:  Extubated overnight.  Cardene weaned to off.  Transfused 1 unit prbcs for Hgb < 8.  Remains on high flow nasal cannula.  Depakote and Haldol (home meds) to resume today.      Objective:  Vital Signs Last 24 Hrs  T(C): 37.4 (18 Jan 2023 04:00), Max: 37.5 (18 Jan 2023 00:00)  T(F): 99.3 (18 Jan 2023 04:00), Max: 99.5 (18 Jan 2023 00:00)  HR: 72 (18 Jan 2023 07:00) (67 - 105)  BP: 127/83 (17 Jan 2023 08:04) (127/83 - 127/83)  BP(mean): 98 (17 Jan 2023 08:04) (98 - 98)  RR: 12 (18 Jan 2023 07:00) (12 - 34)  SpO2: 96% (18 Jan 2023 07:00) (94% - 100%)    Parameters below as of 18 Jan 2023 04:00  Patient On (Oxygen Delivery Method): nasal cannula, high flow              Physical Exam:   General: Sitting in chair, no acute distress  Neurology: Sleepy, but follows commands, no focal deficits.   Eyes: Bilateral pupils equal and reactive   ENT/Neck:  Neck supple, trachea midline   Respiratory: Decreased at bases  CV: Sinus rhythm  Abdominal: Soft, NT, ND +BS   Extremities: Warm    -------------------------------------------------------------------------------------------------------------------------------  Labs:                         7.9    7.60  )-----------( 192      ( 18 Jan 2023 00:16 )             24.5     01-18    140  |  105  |  10  ----------------------------<  139<H>  4.0   |  25  |  0.57    Ca    8.7      18 Jan 2023 00:16  Phos  3.3     01-18  Mg     1.7     01-18    TPro  5.5<L>  /  Alb  3.6  /  TBili  0.4  /  DBili  x   /  AST  80<H>  /  ALT  44  /  AlkPhos  51  01-18    LIVER FUNCTIONS - ( 18 Jan 2023 00:16 )  Alb: 3.6 g/dL / Pro: 5.5 g/dL / ALK PHOS: 51 U/L / ALT: 44 U/L / AST: 80 U/L / GGT: x           PT/INR - ( 17 Jan 2023 18:02 )   PT: 15.0 sec;   INR: 1.29 ratio         PTT - ( 17 Jan 2023 18:02 )  PTT:28.7 sec  ABG - ( 18 Jan 2023 04:52 )  pH, Arterial: 7.41  pH, Blood: x     /  pCO2: 41    /  pO2: 88    / HCO3: 26    / Base Excess: 1.2   /  SaO2: 96.4        ------------------------------------------------------------------------------------------------------------------------------      Assessment:  Patient is a 57M /w CAD s/p CABG on 1/17/23    At high risk for hemodynamic instability  Post op acute respiratory insufficiency  Acute blood loss anemia      Plan:   ***Neuro***  Postoperative acute pain control with Tylenol, Gabapentin, and prns.  Home meds for bipolar disease - will start with VPA and Haldol later today  LIkely resume Seroquel tomorrow.    ***Cardiovascular***  At high risk for hemodynamic instability and cardiac arrhythmias.  BP management w/ Cardene infusion - wean to off.  Beta blocker for rate control   Amiodarone for afib prophylaxis   ASA/Stain daily for CAD  Monitor chest tube output.    ***Pulmonary***  Postoperative acute respiratory insufficiency   High flow nasal cannula   Deep breathing and coughing exercises.  Wean oxygen as able.    ***GI***  Tolerating diet   Protonix for stress ulcer prophylaxis   Bowel regimen with Miralax and Senna     ***Renal***  Trend creatinine  Boyd catheter for strict I/O measurements.   Replete electrolytes as indicated.    ***ID***  Perioperative coverage with Cefuroxime.    ***Endocrine***  Hx of DM2  Insulin as needed for hyperglycemia.    ***Hematology***  Acute blood loss anemia - transfused 1 unit, monitor for bleeding.      Patient requires continuous monitoring with bedside rhythm monitoring, pulse oximetry monitoring, and continuous central venous and arterial pressure monitoring; and intermittent blood gas analysis. Care plan discussed with the ICU care team.   Patient remained critical, at risk for life threatening decompensation.    I have spent 30 minutes providing critical care management to this patient.    By signing my name below, I, Jyoti Junior, attest that this documentation has been prepared under the direction and in the presence of Shani Berg MD  Electronically signed: Blaise Reynaga, 01-18-23 @ 07:10    I, Shani Berg MD, personally performed the services described in this documentation. all medical record entries made by the blaise were at my direction and in my presence. I have reviewed the chart and agree that the record reflects my personal performance and is accurate and complete  Electronically signed: Shani Berg MD

## 2023-01-18 NOTE — PROGRESS NOTE ADULT - ASSESSMENT
57M /w Penicillin allergy, PMH  HTN, HLD, DM2, bipolar disorder, SHIN s/p uvula resection not on CPAP,  known CAD with MI with RCA/OM stents 2013 who presented to his Cardiologist complaining of progressive SOB/LEMOS over the last 4 weeks.  Denies having typical chest pain type symptoms.  Had abnormal stress test and underwent elective cath yesterday on 1/11/23 which revealed EF 65, pLAD 90, mLAD 20, dLAD 80, OM 99, mRCA 95 via RRA access.  Currently patient denies chest pain/sob.  No headache/dizziness.  No abdominal pain/nausea/vomiting.  No bowel/urinary symptoms.  No lower extremity pain/numbness/tingling.  No tooth pain.  Patient states he typically ambulates independently without use of assistive devices and lives with his wife in [place].       Problem/Recommendation - 1:  ·  Problem: CAD, multiple vessel.   ·  Recommendation: S/P CABG x4 vessel   CTS help appreciated .   S/P Extubation . On HF oxygen.      Problem/Recommendation - 2:  ·  Problem: Diabetes.   ·  Recommendation: Uncontrolled as HgbA1C high so endo helping.   On Insulin.   Sugars under good control.     Problem/Recommendation - 3:  ·  Problem: Hypertension.   ·  Recommendation: BP readings fine.   BP meds  per CTICU .      Problem/Recommendation - 4:  ·  Problem: Hyperlipidemia.   ·  Recommendation: Statin.     Problem/Recommendation - 5:  ·  Problem: Bipolar disorder.   ·  Recommendation: Home meds.

## 2023-01-18 NOTE — OCCUPATIONAL THERAPY INITIAL EVALUATION ADULT - ADDITIONAL COMMENTS
pt reports lives in private home with family, 2 NICO, first-floor setup, tub. Prior to admission Ind with ADLs/ambulation, no AD/DME.

## 2023-01-18 NOTE — AIRWAY REMOVAL NOTE  ADULT & PEDS - ARTIFICAL AIRWAY REMOVAL COMMENTS
Written order for extubation verified. The patient was identified by full name and birth date compared to the identification band. Present during the procedure was LYNDA Mejia

## 2023-01-18 NOTE — OCCUPATIONAL THERAPY INITIAL EVALUATION ADULT - PERTINENT HX OF CURRENT PROBLEM, REHAB EVAL
57M /w Penicillin allergy, PMH  HTN, HLD, DM2, bipolar disorder, SHIN s/p uvula resection not on CPAP,  known CAD with MI with RCA/OM stents 2013 who presented to his Cardiologist complaining of progressive SOB/LEMOS over the last 4 weeks.  Denies having typical chest pain type symptoms.  Had abnormal stress test and underwent elective cath yesterday on 1/11/23 which revealed EF 65, pLAD 90, mLAD 20, dLAD 80, OM 99, mRCA 95 via RRA access.   Now s/p CABG on 1/17/23. 57M /w Penicillin allergy, PMH  HTN, HLD, DM2, bipolar disorder, SHIN s/p uvula resection not on CPAP,  known CAD with MI with RCA/OM stents 2013 who presented to his Cardiologist complaining of progressive SOB/LEMOS over the last 4 weeks.  Denies having typical chest pain type symptoms.  Had abnormal stress test and underwent elective cath yesterday on 1/11/23 which revealed EF 65, pLAD 90, mLAD 20, dLAD 80, OM 99, mRCA 95 via RRA access. Now s/p CABG on 1/17/23.

## 2023-01-18 NOTE — PROGRESS NOTE ADULT - ASSESSMENT
Assessment  DMT2: 57y Male with DM T2 with hyperglycemia admitted with chest pain  A1C is 8.1%, patient was on insulin at home, was on basal bolus and insulin coverage, blood sugars improving, PO diet advanced.  CAD: CABG, on medications, monitored, asymptomatic.  HTN: On antihypertensive medications, on nicardipine gtt, monitored, asymptomatic.                Guerrero Sequeira MD  Cell:  [phone]  Office: [phone]               Assessment  DMT2: 57y Male with DM T2 with hyperglycemia admitted with chest pain  A1C is 8.1%, patient was on insulin at home, was on  basal bolus and insulin coverage, blood sugars improving, PO diet advanced.  CAD: CABG, on medications, monitored, asymptomatic.  HTN: On antihypertensive medications, on nicardipine gtt, monitored, asymptomatic.                Guerrero Sequeira MD  Cell:  [phone]  Office: [phone]

## 2023-01-18 NOTE — PROGRESS NOTE ADULT - SUBJECTIVE AND OBJECTIVE BOX
Date of Service  : 01-18-23     INTERVAL HPI/OVERNIGHT EVENTS: S/P Extubation .   Vital Signs Last 24 Hrs  T(C): 37.4 (18 Jan 2023 12:00), Max: 37.7 (18 Jan 2023 08:00)  T(F): 99.3 (18 Jan 2023 12:00), Max: 99.9 (18 Jan 2023 08:00)  HR: 75 (18 Jan 2023 15:00) (67 - 105)  BP: 130/80 (18 Jan 2023 11:15) (123/75 - 130/80)  BP(mean): 100 (18 Jan 2023 11:15) (93 - 100)  RR: 20 (18 Jan 2023 15:00) (9 - 34)  SpO2: 97% (18 Jan 2023 15:00) (94% - 100%)    Parameters below as of 18 Jan 2023 14:24  Patient On (Oxygen Delivery Method): nasal cannula, high flow      I&O's Summary    17 Jan 2023 07:01  -  18 Jan 2023 07:00  --------------------------------------------------------  IN: 2434.5 mL / OUT: 1305 mL / NET: 1129.5 mL    18 Jan 2023 07:01  -  18 Jan 2023 15:25  --------------------------------------------------------  IN: 262 mL / OUT: 520 mL / NET: -258 mL      MEDICATIONS  (STANDING):  acetaminophen     Tablet .. 650 milliGRAM(s) Oral every 6 hours  aMIOdarone    Tablet 400 milliGRAM(s) Oral two times a day  ascorbic acid 500 milliGRAM(s) Oral two times a day  aspirin enteric coated 325 milliGRAM(s) Oral daily  atorvastatin 80 milliGRAM(s) Oral at bedtime  cefuroxime  IVPB 1500 milliGRAM(s) IV Intermittent every 8 hours  chlorhexidine 2% Cloths 1 Application(s) Topical daily  divalproex  milliGRAM(s) Oral at bedtime  gabapentin 100 milliGRAM(s) Oral every 8 hours  insulin regular Infusion 3 Unit(s)/Hr (3 mL/Hr) IV Continuous <Continuous>  iron sucrose IVPB 100 milliGRAM(s) IV Intermittent every 24 hours  metoprolol tartrate 25 milliGRAM(s) Oral every 8 hours  niCARdipine Infusion 5 mG/Hr (25 mL/Hr) IV Continuous <Continuous>  pantoprazole    Tablet 40 milliGRAM(s) Oral before breakfast  polyethylene glycol 3350 17 Gram(s) Oral daily  senna 2 Tablet(s) Oral at bedtime  sodium chloride 0.9%. 1000 milliLiter(s) (10 mL/Hr) IV Continuous <Continuous>    MEDICATIONS  (PRN):  HYDROmorphone  Injectable 0.5 milliGRAM(s) IV Push every 6 hours PRN Breakthrough Pain  oxyCODONE    IR 5 milliGRAM(s) Oral every 4 hours PRN Moderate Pain (4 - 6)  oxyCODONE    IR 10 milliGRAM(s) Oral every 4 hours PRN Severe Pain (7 - 10)    LABS:                        7.9    7.60  )-----------( 192      ( 18 Jan 2023 00:16 )             24.5     01-18    140  |  105  |  10  ----------------------------<  139<H>  4.0   |  25  |  0.57    Ca    8.7      18 Jan 2023 00:16  Phos  3.3     01-18  Mg     1.7     01-18    TPro  5.5<L>  /  Alb  3.6  /  TBili  0.4  /  DBili  x   /  AST  80<H>  /  ALT  44  /  AlkPhos  51  01-18    PT/INR - ( 17 Jan 2023 18:02 )   PT: 15.0 sec;   INR: 1.29 ratio         PTT - ( 17 Jan 2023 18:02 )  PTT:28.7 sec    CAPILLARY BLOOD GLUCOSE  142 (18 Jan 2023 07:00)  133 (18 Jan 2023 06:00)  118 (18 Jan 2023 04:00)  126 (18 Jan 2023 03:00)  136 (18 Jan 2023 02:00)  127 (18 Jan 2023 01:00)  138 (18 Jan 2023 00:00)  155 (17 Jan 2023 23:00)  159 (17 Jan 2023 22:00)  156 (17 Jan 2023 21:00)  158 (17 Jan 2023 20:00)      POCT Blood Glucose.: 117 mg/dL (18 Jan 2023 12:34)  POCT Blood Glucose.: 121 mg/dL (18 Jan 2023 11:14)  POCT Blood Glucose.: 122 mg/dL (18 Jan 2023 09:55)  POCT Blood Glucose.: 127 mg/dL (18 Jan 2023 09:14)  POCT Blood Glucose.: 137 mg/dL (18 Jan 2023 07:57)  POCT Blood Glucose.: 142 mg/dL (18 Jan 2023 06:48)  POCT Blood Glucose.: 133 mg/dL (18 Jan 2023 06:11)  POCT Blood Glucose.: 116 mg/dL (18 Jan 2023 04:50)  POCT Blood Glucose.: 118 mg/dL (18 Jan 2023 04:07)  POCT Blood Glucose.: 126 mg/dL (18 Jan 2023 02:49)  POCT Blood Glucose.: 136 mg/dL (18 Jan 2023 01:53)  POCT Blood Glucose.: 127 mg/dL (18 Jan 2023 01:07)  POCT Blood Glucose.: 138 mg/dL (18 Jan 2023 00:00)  POCT Blood Glucose.: 155 mg/dL (17 Jan 2023 22:58)  POCT Blood Glucose.: 159 mg/dL (17 Jan 2023 22:10)  POCT Blood Glucose.: 156 mg/dL (17 Jan 2023 20:47)  POCT Blood Glucose.: 158 mg/dL (17 Jan 2023 19:59)      ABG - ( 18 Jan 2023 14:15 )  pH, Arterial: 7.39  pH, Blood: x     /  pCO2: 39    /  pO2: 79    / HCO3: 24    / Base Excess: -1.2  /  SaO2: 95.9                    Consultant(s) Notes Reviewed:  [x ] YES  [ ] NO    PHYSICAL EXAM:  GENERAL: NAD, ,not in any distress ,NC HF oxygen   HEAD:  Atraumatic, Normocephalic  NECK: Supple, No JVD, Normal thyroid  NERVOUS SYSTEM:  Alert & Oriented X3, No focal deficit   CHEST/LUNG: Good air entry bilateral with CT+++  HEART: Regular rate and rhythm; No murmurs, rubs, or gallops  ABDOMEN: Soft, Nontender, Nondistended; Bowel sounds present  EXTREMITIES:  Leg ace wrapped.       Care Discussed with Consultants/Other Providers [ x] YES  [ ] NO

## 2023-01-18 NOTE — PHYSICAL THERAPY INITIAL EVALUATION ADULT - ADDITIONAL COMMENTS
Patient lives in apartment with spouse and daughter, 5 steps to enter
Pt resides in a pvt home w/ family, 2 steps to enter, first floor set up available. PTA pt was independent w/ all functional mobility & did not use an AD for ambulation.

## 2023-01-18 NOTE — OCCUPATIONAL THERAPY INITIAL EVALUATION ADULT - RANGE OF MOTION EXAMINATION, UPPER EXTREMITY
bilateral UE Active ROM was WFL  (within functional limits) Affect appropriate/Interactive/Normal unassisted gait/Motor strength normal in all extremities/Sensation intact to touch

## 2023-01-18 NOTE — PROGRESS NOTE ADULT - SUBJECTIVE AND OBJECTIVE BOX
Chief complaint  Patient is a 57y old  Male who presents with a chief complaint of CABG eval (18 Jan 2023 07:18)   Review of systems  Patient in chair , looks comfortable.    Labs and Fingersticks  CAPILLARY BLOOD GLUCOSE  142 (18 Jan 2023 07:00)  133 (18 Jan 2023 06:00)  118 (18 Jan 2023 04:00)  126 (18 Jan 2023 03:00)  136 (18 Jan 2023 02:00)  127 (18 Jan 2023 01:00)  138 (18 Jan 2023 00:00)  155 (17 Jan 2023 23:00)  159 (17 Jan 2023 22:00)  156 (17 Jan 2023 21:00)  158 (17 Jan 2023 20:00)      POCT Blood Glucose.: 117 mg/dL (18 Jan 2023 12:34)  POCT Blood Glucose.: 121 mg/dL (18 Jan 2023 11:14)  POCT Blood Glucose.: 122 mg/dL (18 Jan 2023 09:55)  POCT Blood Glucose.: 127 mg/dL (18 Jan 2023 09:14)  POCT Blood Glucose.: 137 mg/dL (18 Jan 2023 07:57)  POCT Blood Glucose.: 142 mg/dL (18 Jan 2023 06:48)  POCT Blood Glucose.: 133 mg/dL (18 Jan 2023 06:11)  POCT Blood Glucose.: 116 mg/dL (18 Jan 2023 04:50)  POCT Blood Glucose.: 118 mg/dL (18 Jan 2023 04:07)  POCT Blood Glucose.: 126 mg/dL (18 Jan 2023 02:49)  POCT Blood Glucose.: 136 mg/dL (18 Jan 2023 01:53)  POCT Blood Glucose.: 127 mg/dL (18 Jan 2023 01:07)  POCT Blood Glucose.: 138 mg/dL (18 Jan 2023 00:00)  POCT Blood Glucose.: 155 mg/dL (17 Jan 2023 22:58)  POCT Blood Glucose.: 159 mg/dL (17 Jan 2023 22:10)  POCT Blood Glucose.: 156 mg/dL (17 Jan 2023 20:47)  POCT Blood Glucose.: 158 mg/dL (17 Jan 2023 19:59)      Anion Gap, Serum: 10 (01-18 @ 00:16)  Anion Gap, Serum: 9 (01-17 @ 18:03)      Calcium, Total Serum: 8.7 (01-18 @ 00:16)  Calcium, Total Serum: 9.2 (01-17 @ 18:03)  Albumin, Serum: 3.6 (01-18 @ 00:16)  Albumin, Serum: 3.6 (01-17 @ 18:03)    Alanine Aminotransferase (ALT/SGPT): 44 (01-18 @ 00:16)  Alanine Aminotransferase (ALT/SGPT): 46 *H* (01-17 @ 18:03)  Alkaline Phosphatase, Serum: 51 (01-18 @ 00:16)  Alkaline Phosphatase, Serum: 54 (01-17 @ 18:03)  Aspartate Aminotransferase (AST/SGOT): 80 *H* (01-18 @ 00:16)  Aspartate Aminotransferase (AST/SGOT): 72 *H* (01-17 @ 18:03)        01-18    140  |  105  |  10  ----------------------------<  139<H>  4.0   |  25  |  0.57    Ca    8.7      18 Jan 2023 00:16  Phos  3.3     01-18  Mg     1.7     01-18    TPro  5.5<L>  /  Alb  3.6  /  TBili  0.4  /  DBili  x   /  AST  80<H>  /  ALT  44  /  AlkPhos  51  01-18                        7.9    7.60  )-----------( 192      ( 18 Jan 2023 00:16 )             24.5     Medications  MEDICATIONS  (STANDING):  acetaminophen     Tablet .. 650 milliGRAM(s) Oral every 6 hours  aMIOdarone    Tablet 400 milliGRAM(s) Oral two times a day  ascorbic acid 500 milliGRAM(s) Oral two times a day  aspirin enteric coated 325 milliGRAM(s) Oral daily  atorvastatin 80 milliGRAM(s) Oral at bedtime  cefuroxime  IVPB 1500 milliGRAM(s) IV Intermittent every 8 hours  chlorhexidine 2% Cloths 1 Application(s) Topical daily  gabapentin 100 milliGRAM(s) Oral every 8 hours  insulin regular Infusion 3 Unit(s)/Hr (3 mL/Hr) IV Continuous <Continuous>  metoprolol tartrate 25 milliGRAM(s) Oral every 8 hours  niCARdipine Infusion 5 mG/Hr (25 mL/Hr) IV Continuous <Continuous>  pantoprazole    Tablet 40 milliGRAM(s) Oral before breakfast  polyethylene glycol 3350 17 Gram(s) Oral daily  senna 2 Tablet(s) Oral at bedtime  sodium chloride 0.9%. 1000 milliLiter(s) (10 mL/Hr) IV Continuous <Continuous>      Physical Exam  General: Patient comfortable in upright in chair, extubated  Vital Signs Last 12 Hrs  T(F): 99.3 (01-18-23 @ 12:00), Max: 99.9 (01-18-23 @ 08:00)  HR: 80 (01-18-23 @ 13:15) (72 - 105)  BP: 130/80 (01-18-23 @ 11:15) (123/75 - 130/80)  BP(mean): 100 (01-18-23 @ 11:15) (93 - 100)  RR: 14 (01-18-23 @ 13:15) (9 - 34)  SpO2: 97% (01-18-23 @ 13:15) (94% - 98%)  Neck: No palpable thyroid nodules.  CVS: S1S2, No murmurs  Respiratory: No wheezing, no crepitations  GI: Abdomen soft, bowel sounds positive  Musculoskeletal:  edema lower extremities.   Skin: No skin rashes, no ecchymosis           Chief complaint  Patient is a 57y old  Male who presents with a chief complaint of CABG eval (18 Jan 2023 07:18)   Review of systems  Patient in chair , looks comfortable.    Labs and Fingersticks  CAPILLARY BLOOD GLUCOSE  142 (18 Jan 2023 07:00)  133 (18 Jan 2023 06:00)  118 (18 Jan 2023 04:00)  126 (18 Jan 2023 03:00)  136 (18 Jan 2023 02:00)  127 (18 Jan 2023 01:00)  138 (18 Jan 2023 00:00)  155 (17 Jan 2023 23:00)  159 (17 Jan 2023 22:00)  156 (17 Jan 2023 21:00)  158 (17 Jan 2023 20:00)      POCT Blood Glucose.: 117 mg/dL (18 Jan 2023 12:34)  POCT Blood Glucose.: 121 mg/dL (18 Jan 2023 11:14)  POCT Blood Glucose.: 122 mg/dL (18 Jan 2023 09:55)  POCT Blood Glucose.: 127 mg/dL (18 Jan 2023 09:14)  POCT Blood Glucose.: 137 mg/dL (18 Jan 2023 07:57)  POCT Blood Glucose.: 142 mg/dL (18 Jan 2023 06:48)  POCT Blood Glucose.: 133 mg/dL (18 Jan 2023 06:11)  POCT Blood Glucose.: 116 mg/dL (18 Jan 2023 04:50)  POCT Blood Glucose.: 118 mg/dL (18 Jan 2023 04:07)  POCT Blood Glucose.: 126 mg/dL (18 Jan 2023 02:49)  POCT Blood Glucose.: 136 mg/dL (18 Jan 2023 01:53)  POCT Blood Glucose.: 127 mg/dL (18 Jan 2023 01:07)  POCT Blood Glucose.: 138 mg/dL (18 Jan 2023 00:00)  POCT Blood Glucose.: 155 mg/dL (17 Jan 2023 22:58)  POCT Blood Glucose.: 159 mg/dL (17 Jan 2023 22:10)  POCT Blood Glucose.: 156 mg/dL (17 Jan 2023 20:47)  POCT Blood Glucose.: 158 mg/dL (17 Jan 2023 19:59)      Anion Gap, Serum: 10 (01-18 @ 00:16)  Anion Gap, Serum: 9 (01-17 @ 18:03)      Calcium, Total Serum: 8.7 (01-18 @ 00:16)  Calcium, Total Serum: 9.2 (01-17 @ 18:03)  Albumin, Serum: 3.6 (01-18 @ 00:16)  Albumin, Serum: 3.6 (01-17 @ 18:03)    Alanine Aminotransferase (ALT/SGPT): 44 (01-18 @ 00:16)  Alanine Aminotransferase (ALT/SGPT): 46 *H* (01-17 @ 18:03)  Alkaline Phosphatase, Serum: 51 (01-18 @ 00:16)  Alkaline Phosphatase, Serum: 54 (01-17 @ 18:03)  Aspartate Aminotransferase (AST/SGOT): 80 *H* (01-18 @ 00:16)  Aspartate Aminotransferase (AST/SGOT): 72 *H* (01-17 @ 18:03)        01-18    140  |  105  |  10  ----------------------------<  139<H>  4.0   |  25  |  0.57    Ca    8.7      18 Jan 2023 00:16  Phos  3.3     01-18  Mg     1.7     01-18    TPro  5.5<L>  /  Alb  3.6  /  TBili  0.4  /  DBili  x   /  AST  80<H>  /  ALT  44  /  AlkPhos  51  01-18                        7.9    7.60  )-----------( 192      ( 18 Jan 2023 00:16 )             24.5     Medications  MEDICATIONS  (STANDING):  acetaminophen     Tablet .. 650 milliGRAM(s) Oral every 6 hours  aMIOdarone    Tablet 400 milliGRAM(s) Oral two times a day  ascorbic acid 500 milliGRAM(s) Oral two times a day  aspirin enteric coated 325 milliGRAM(s) Oral daily  atorvastatin 80 milliGRAM(s) Oral at bedtime  cefuroxime  IVPB 1500 milliGRAM(s) IV Intermittent every 8 hours  chlorhexidine 2% Cloths 1 Application(s) Topical daily  gabapentin 100 milliGRAM(s) Oral every 8 hours  insulin regular Infusion 3 Unit(s)/Hr (3 mL/Hr) IV Continuous <Continuous>  metoprolol tartrate 25 milliGRAM(s) Oral every 8 hours  niCARdipine Infusion 5 mG/Hr (25 mL/Hr) IV Continuous <Continuous>  pantoprazole    Tablet 40 milliGRAM(s) Oral before breakfast  polyethylene glycol 3350 17 Gram(s) Oral daily  senna 2 Tablet(s) Oral at bedtime  sodium chloride 0.9%. 1000 milliLiter(s) (10 mL/Hr) IV Continuous <Continuous>      Physical Exam  General: Patient comfortable in upright in chair, extubated  Vital Signs Last 12 Hrs  T(F): 99.3 (01-18-23 @ 12:00), Max: 99.9 (01-18-23 @ 08:00)  HR: 80 (01-18-23 @ 13:15) (72 - 105)  BP: 130/80 (01-18-23 @ 11:15) (123/75 - 130/80)  BP(mean): 100 (01-18-23 @ 11:15) (93 - 100)  RR: 14 (01-18-23 @ 13:15) (9 - 34)  SpO2: 97% (01-18-23 @ 13:15) (94% - 98%)  Neck: No palpable thyroid nodules.  CVS: S1S2, No murmurs  Respiratory: No wheezing, no crepitations  GI: Abdomen soft, bowel sounds positive  Musculoskeletal:  edema lower extremities.   Skin: No skin rashes, no ecchymosis

## 2023-01-18 NOTE — OCCUPATIONAL THERAPY INITIAL EVALUATION ADULT - ADL RETRAINING, OT EVAL
pt will perform UB dressing with I in 4 weeks / pt will perform grooming task with I in 4 weeks / pt will perform toileting task with I in 4 weeks

## 2023-01-19 DIAGNOSIS — Z95.1 PRESENCE OF AORTOCORONARY BYPASS GRAFT: ICD-10-CM

## 2023-01-19 LAB
ALBUMIN SERPL ELPH-MCNC: 3.6 G/DL — SIGNIFICANT CHANGE UP (ref 3.3–5)
ALP SERPL-CCNC: 58 U/L — SIGNIFICANT CHANGE UP (ref 40–120)
ALT FLD-CCNC: 36 U/L — SIGNIFICANT CHANGE UP (ref 10–45)
ANION GAP SERPL CALC-SCNC: 12 MMOL/L — SIGNIFICANT CHANGE UP (ref 5–17)
AST SERPL-CCNC: 44 U/L — HIGH (ref 10–40)
BASOPHILS # BLD AUTO: 0.02 K/UL — SIGNIFICANT CHANGE UP (ref 0–0.2)
BASOPHILS NFR BLD AUTO: 0.2 % — SIGNIFICANT CHANGE UP (ref 0–2)
BILIRUB SERPL-MCNC: 0.3 MG/DL — SIGNIFICANT CHANGE UP (ref 0.2–1.2)
BUN SERPL-MCNC: 11 MG/DL — SIGNIFICANT CHANGE UP (ref 7–23)
CALCIUM SERPL-MCNC: 8.6 MG/DL — SIGNIFICANT CHANGE UP (ref 8.4–10.5)
CHLORIDE SERPL-SCNC: 103 MMOL/L — SIGNIFICANT CHANGE UP (ref 96–108)
CO2 SERPL-SCNC: 23 MMOL/L — SIGNIFICANT CHANGE UP (ref 22–31)
CREAT SERPL-MCNC: 0.56 MG/DL — SIGNIFICANT CHANGE UP (ref 0.5–1.3)
EGFR: 115 ML/MIN/1.73M2 — SIGNIFICANT CHANGE UP
EOSINOPHIL # BLD AUTO: 0.02 K/UL — SIGNIFICANT CHANGE UP (ref 0–0.5)
EOSINOPHIL NFR BLD AUTO: 0.2 % — SIGNIFICANT CHANGE UP (ref 0–6)
GAS PNL BLDA: SIGNIFICANT CHANGE UP
GAS PNL BLDA: SIGNIFICANT CHANGE UP
GLUCOSE BLDC GLUCOMTR-MCNC: 102 MG/DL — HIGH (ref 70–99)
GLUCOSE BLDC GLUCOMTR-MCNC: 105 MG/DL — HIGH (ref 70–99)
GLUCOSE BLDC GLUCOMTR-MCNC: 111 MG/DL — HIGH (ref 70–99)
GLUCOSE BLDC GLUCOMTR-MCNC: 123 MG/DL — HIGH (ref 70–99)
GLUCOSE BLDC GLUCOMTR-MCNC: 132 MG/DL — HIGH (ref 70–99)
GLUCOSE BLDC GLUCOMTR-MCNC: 134 MG/DL — HIGH (ref 70–99)
GLUCOSE BLDC GLUCOMTR-MCNC: 148 MG/DL — HIGH (ref 70–99)
GLUCOSE BLDC GLUCOMTR-MCNC: 175 MG/DL — HIGH (ref 70–99)
GLUCOSE BLDC GLUCOMTR-MCNC: 179 MG/DL — HIGH (ref 70–99)
GLUCOSE BLDC GLUCOMTR-MCNC: 207 MG/DL — HIGH (ref 70–99)
GLUCOSE BLDC GLUCOMTR-MCNC: 207 MG/DL — HIGH (ref 70–99)
GLUCOSE BLDC GLUCOMTR-MCNC: 209 MG/DL — HIGH (ref 70–99)
GLUCOSE BLDC GLUCOMTR-MCNC: 217 MG/DL — HIGH (ref 70–99)
GLUCOSE BLDC GLUCOMTR-MCNC: 94 MG/DL — SIGNIFICANT CHANGE UP (ref 70–99)
GLUCOSE SERPL-MCNC: 111 MG/DL — HIGH (ref 70–99)
HCT VFR BLD CALC: 29.7 % — LOW (ref 39–50)
HGB BLD-MCNC: 9.6 G/DL — LOW (ref 13–17)
IMM GRANULOCYTES NFR BLD AUTO: 0.6 % — SIGNIFICANT CHANGE UP (ref 0–0.9)
LYMPHOCYTES # BLD AUTO: 1.63 K/UL — SIGNIFICANT CHANGE UP (ref 1–3.3)
LYMPHOCYTES # BLD AUTO: 13.3 % — SIGNIFICANT CHANGE UP (ref 13–44)
MAGNESIUM SERPL-MCNC: 2.1 MG/DL — SIGNIFICANT CHANGE UP (ref 1.6–2.6)
MCHC RBC-ENTMCNC: 28 PG — SIGNIFICANT CHANGE UP (ref 27–34)
MCHC RBC-ENTMCNC: 32.3 GM/DL — SIGNIFICANT CHANGE UP (ref 32–36)
MCV RBC AUTO: 86.6 FL — SIGNIFICANT CHANGE UP (ref 80–100)
MONOCYTES # BLD AUTO: 1.82 K/UL — HIGH (ref 0–0.9)
MONOCYTES NFR BLD AUTO: 14.9 % — HIGH (ref 2–14)
NEUTROPHILS # BLD AUTO: 8.66 K/UL — HIGH (ref 1.8–7.4)
NEUTROPHILS NFR BLD AUTO: 70.8 % — SIGNIFICANT CHANGE UP (ref 43–77)
NRBC # BLD: 0 /100 WBCS — SIGNIFICANT CHANGE UP (ref 0–0)
PHOSPHATE SERPL-MCNC: 2.7 MG/DL — SIGNIFICANT CHANGE UP (ref 2.5–4.5)
PLATELET # BLD AUTO: 225 K/UL — SIGNIFICANT CHANGE UP (ref 150–400)
POTASSIUM SERPL-MCNC: 4 MMOL/L — SIGNIFICANT CHANGE UP (ref 3.5–5.3)
POTASSIUM SERPL-SCNC: 4 MMOL/L — SIGNIFICANT CHANGE UP (ref 3.5–5.3)
PROT SERPL-MCNC: 6.2 G/DL — SIGNIFICANT CHANGE UP (ref 6–8.3)
RBC # BLD: 3.43 M/UL — LOW (ref 4.2–5.8)
RBC # FLD: 15.4 % — HIGH (ref 10.3–14.5)
SODIUM SERPL-SCNC: 138 MMOL/L — SIGNIFICANT CHANGE UP (ref 135–145)
WBC # BLD: 12.22 K/UL — HIGH (ref 3.8–10.5)
WBC # FLD AUTO: 12.22 K/UL — HIGH (ref 3.8–10.5)

## 2023-01-19 PROCEDURE — 93010 ELECTROCARDIOGRAM REPORT: CPT

## 2023-01-19 PROCEDURE — 71045 X-RAY EXAM CHEST 1 VIEW: CPT | Mod: 26

## 2023-01-19 PROCEDURE — 99291 CRITICAL CARE FIRST HOUR: CPT

## 2023-01-19 RX ORDER — CLOPIDOGREL BISULFATE 75 MG/1
75 TABLET, FILM COATED ORAL DAILY
Refills: 0 | Status: DISCONTINUED | OUTPATIENT
Start: 2023-01-19 | End: 2023-01-23

## 2023-01-19 RX ORDER — BUMETANIDE 0.25 MG/ML
1 INJECTION INTRAMUSCULAR; INTRAVENOUS ONCE
Refills: 0 | Status: COMPLETED | OUTPATIENT
Start: 2023-01-19 | End: 2023-01-19

## 2023-01-19 RX ORDER — HYDRALAZINE HCL 50 MG
10 TABLET ORAL ONCE
Refills: 0 | Status: COMPLETED | OUTPATIENT
Start: 2023-01-19 | End: 2023-01-19

## 2023-01-19 RX ORDER — INSULIN LISPRO 100/ML
VIAL (ML) SUBCUTANEOUS
Refills: 0 | Status: DISCONTINUED | OUTPATIENT
Start: 2023-01-19 | End: 2023-01-23

## 2023-01-19 RX ORDER — FUROSEMIDE 40 MG
40 TABLET ORAL ONCE
Refills: 0 | Status: COMPLETED | OUTPATIENT
Start: 2023-01-19 | End: 2023-01-19

## 2023-01-19 RX ORDER — QUETIAPINE FUMARATE 200 MG/1
100 TABLET, FILM COATED ORAL AT BEDTIME
Refills: 0 | Status: DISCONTINUED | OUTPATIENT
Start: 2023-01-19 | End: 2023-01-23

## 2023-01-19 RX ORDER — INSULIN GLARGINE 100 [IU]/ML
45 INJECTION, SOLUTION SUBCUTANEOUS AT BEDTIME
Refills: 0 | Status: DISCONTINUED | OUTPATIENT
Start: 2023-01-19 | End: 2023-01-19

## 2023-01-19 RX ORDER — INSULIN LISPRO 100/ML
13 VIAL (ML) SUBCUTANEOUS
Refills: 0 | Status: DISCONTINUED | OUTPATIENT
Start: 2023-01-19 | End: 2023-01-19

## 2023-01-19 RX ORDER — BUMETANIDE 0.25 MG/ML
1 INJECTION INTRAMUSCULAR; INTRAVENOUS
Refills: 0 | Status: DISCONTINUED | OUTPATIENT
Start: 2023-01-19 | End: 2023-01-20

## 2023-01-19 RX ORDER — INSULIN LISPRO 100/ML
18 VIAL (ML) SUBCUTANEOUS
Refills: 0 | Status: DISCONTINUED | OUTPATIENT
Start: 2023-01-19 | End: 2023-01-20

## 2023-01-19 RX ORDER — METOPROLOL TARTRATE 50 MG
50 TABLET ORAL EVERY 12 HOURS
Refills: 0 | Status: DISCONTINUED | OUTPATIENT
Start: 2023-01-19 | End: 2023-01-21

## 2023-01-19 RX ORDER — ESCITALOPRAM OXALATE 10 MG/1
10 TABLET, FILM COATED ORAL DAILY
Refills: 0 | Status: DISCONTINUED | OUTPATIENT
Start: 2023-01-19 | End: 2023-01-23

## 2023-01-19 RX ORDER — DIVALPROEX SODIUM 500 MG/1
1500 TABLET, DELAYED RELEASE ORAL AT BEDTIME
Refills: 0 | Status: DISCONTINUED | OUTPATIENT
Start: 2023-01-19 | End: 2023-01-23

## 2023-01-19 RX ORDER — LANOLIN ALCOHOL/MO/W.PET/CERES
5 CREAM (GRAM) TOPICAL AT BEDTIME
Refills: 0 | Status: DISCONTINUED | OUTPATIENT
Start: 2023-01-19 | End: 2023-01-23

## 2023-01-19 RX ORDER — INSULIN GLARGINE 100 [IU]/ML
52 INJECTION, SOLUTION SUBCUTANEOUS AT BEDTIME
Refills: 0 | Status: DISCONTINUED | OUTPATIENT
Start: 2023-01-19 | End: 2023-01-20

## 2023-01-19 RX ORDER — MAGNESIUM SULFATE 500 MG/ML
2 VIAL (ML) INJECTION ONCE
Refills: 0 | Status: COMPLETED | OUTPATIENT
Start: 2023-01-19 | End: 2023-01-19

## 2023-01-19 RX ORDER — INSULIN LISPRO 100/ML
VIAL (ML) SUBCUTANEOUS AT BEDTIME
Refills: 0 | Status: DISCONTINUED | OUTPATIENT
Start: 2023-01-19 | End: 2023-01-23

## 2023-01-19 RX ADMIN — CHLORHEXIDINE GLUCONATE 1 APPLICATION(S): 213 SOLUTION TOPICAL at 13:08

## 2023-01-19 RX ADMIN — Medication 13 UNIT(S): at 17:01

## 2023-01-19 RX ADMIN — Medication 2: at 13:02

## 2023-01-19 RX ADMIN — PANTOPRAZOLE SODIUM 40 MILLIGRAM(S): 20 TABLET, DELAYED RELEASE ORAL at 05:11

## 2023-01-19 RX ADMIN — OXYCODONE HYDROCHLORIDE 10 MILLIGRAM(S): 5 TABLET ORAL at 22:17

## 2023-01-19 RX ADMIN — CLOPIDOGREL BISULFATE 75 MILLIGRAM(S): 75 TABLET, FILM COATED ORAL at 17:01

## 2023-01-19 RX ADMIN — ENOXAPARIN SODIUM 30 MILLIGRAM(S): 100 INJECTION SUBCUTANEOUS at 17:00

## 2023-01-19 RX ADMIN — AMIODARONE HYDROCHLORIDE 400 MILLIGRAM(S): 400 TABLET ORAL at 17:01

## 2023-01-19 RX ADMIN — AMIODARONE HYDROCHLORIDE 400 MILLIGRAM(S): 400 TABLET ORAL at 05:10

## 2023-01-19 RX ADMIN — Medication 25 GRAM(S): at 01:54

## 2023-01-19 RX ADMIN — Medication 10 MILLIGRAM(S): at 14:06

## 2023-01-19 RX ADMIN — Medication 13 UNIT(S): at 13:02

## 2023-01-19 RX ADMIN — HYDROMORPHONE HYDROCHLORIDE 0.5 MILLIGRAM(S): 2 INJECTION INTRAMUSCULAR; INTRAVENOUS; SUBCUTANEOUS at 16:15

## 2023-01-19 RX ADMIN — Medication 325 MILLIGRAM(S): at 11:21

## 2023-01-19 RX ADMIN — OXYCODONE HYDROCHLORIDE 10 MILLIGRAM(S): 5 TABLET ORAL at 23:15

## 2023-01-19 RX ADMIN — Medication 650 MILLIGRAM(S): at 11:21

## 2023-01-19 RX ADMIN — OXYCODONE HYDROCHLORIDE 10 MILLIGRAM(S): 5 TABLET ORAL at 13:10

## 2023-01-19 RX ADMIN — IRON SUCROSE 210 MILLIGRAM(S): 20 INJECTION, SOLUTION INTRAVENOUS at 20:09

## 2023-01-19 RX ADMIN — HYDROMORPHONE HYDROCHLORIDE 0.5 MILLIGRAM(S): 2 INJECTION INTRAMUSCULAR; INTRAVENOUS; SUBCUTANEOUS at 08:30

## 2023-01-19 RX ADMIN — HYDROMORPHONE HYDROCHLORIDE 0.5 MILLIGRAM(S): 2 INJECTION INTRAMUSCULAR; INTRAVENOUS; SUBCUTANEOUS at 02:30

## 2023-01-19 RX ADMIN — Medication 100 MILLIGRAM(S): at 00:44

## 2023-01-19 RX ADMIN — INSULIN GLARGINE 52 UNIT(S): 100 INJECTION, SOLUTION SUBCUTANEOUS at 22:17

## 2023-01-19 RX ADMIN — Medication 40 MILLIGRAM(S): at 00:57

## 2023-01-19 RX ADMIN — QUETIAPINE FUMARATE 100 MILLIGRAM(S): 200 TABLET, FILM COATED ORAL at 21:56

## 2023-01-19 RX ADMIN — HYDROMORPHONE HYDROCHLORIDE 0.5 MILLIGRAM(S): 2 INJECTION INTRAMUSCULAR; INTRAVENOUS; SUBCUTANEOUS at 08:45

## 2023-01-19 RX ADMIN — Medication 650 MILLIGRAM(S): at 00:42

## 2023-01-19 RX ADMIN — POLYETHYLENE GLYCOL 3350 17 GRAM(S): 17 POWDER, FOR SOLUTION ORAL at 11:21

## 2023-01-19 RX ADMIN — Medication 650 MILLIGRAM(S): at 01:00

## 2023-01-19 RX ADMIN — Medication 650 MILLIGRAM(S): at 17:30

## 2023-01-19 RX ADMIN — OXYCODONE HYDROCHLORIDE 10 MILLIGRAM(S): 5 TABLET ORAL at 12:55

## 2023-01-19 RX ADMIN — GABAPENTIN 100 MILLIGRAM(S): 400 CAPSULE ORAL at 21:57

## 2023-01-19 RX ADMIN — Medication 500 MILLIGRAM(S): at 05:10

## 2023-01-19 RX ADMIN — Medication 650 MILLIGRAM(S): at 17:01

## 2023-01-19 RX ADMIN — Medication 2: at 17:02

## 2023-01-19 RX ADMIN — Medication 50 MILLIGRAM(S): at 17:01

## 2023-01-19 RX ADMIN — GABAPENTIN 100 MILLIGRAM(S): 400 CAPSULE ORAL at 14:06

## 2023-01-19 RX ADMIN — Medication 500 MILLIGRAM(S): at 17:01

## 2023-01-19 RX ADMIN — Medication 650 MILLIGRAM(S): at 11:45

## 2023-01-19 RX ADMIN — BUMETANIDE 1 MILLIGRAM(S): 0.25 INJECTION INTRAMUSCULAR; INTRAVENOUS at 06:18

## 2023-01-19 RX ADMIN — HYDROMORPHONE HYDROCHLORIDE 0.5 MILLIGRAM(S): 2 INJECTION INTRAMUSCULAR; INTRAVENOUS; SUBCUTANEOUS at 02:45

## 2023-01-19 RX ADMIN — HYDROMORPHONE HYDROCHLORIDE 0.5 MILLIGRAM(S): 2 INJECTION INTRAMUSCULAR; INTRAVENOUS; SUBCUTANEOUS at 16:00

## 2023-01-19 RX ADMIN — OXYCODONE HYDROCHLORIDE 10 MILLIGRAM(S): 5 TABLET ORAL at 05:09

## 2023-01-19 RX ADMIN — Medication 650 MILLIGRAM(S): at 05:11

## 2023-01-19 RX ADMIN — SENNA PLUS 2 TABLET(S): 8.6 TABLET ORAL at 21:56

## 2023-01-19 RX ADMIN — GABAPENTIN 100 MILLIGRAM(S): 400 CAPSULE ORAL at 05:10

## 2023-01-19 RX ADMIN — ATORVASTATIN CALCIUM 80 MILLIGRAM(S): 80 TABLET, FILM COATED ORAL at 21:56

## 2023-01-19 RX ADMIN — Medication 25 MILLIGRAM(S): at 05:10

## 2023-01-19 RX ADMIN — ENOXAPARIN SODIUM 30 MILLIGRAM(S): 100 INJECTION SUBCUTANEOUS at 05:10

## 2023-01-19 RX ADMIN — DIVALPROEX SODIUM 1500 MILLIGRAM(S): 500 TABLET, DELAYED RELEASE ORAL at 21:57

## 2023-01-19 RX ADMIN — Medication 5 MILLIGRAM(S): at 23:49

## 2023-01-19 NOTE — DIETITIAN INITIAL EVALUATION ADULT - OTHER INFO
-- Pt reports UBW 250lbs without any recent changes. Dosing wt 251.3lbs on 1/17 with most recent standing scale weight (pre-op) 252.4lbs on 1/15. Current weight 262.5lbs 1/19 suggestive of alyssa-operative fluid shifts/gain. Will continue to monitor.   -- Pt with hx of T2DM, HbA1c 8.2% suggesting suboptimal glycemic control PTA. DM regimen PTA: pt checks FS 2 x daily, on insulin at home. BG management currently addressed with SSI and Lantus.  -- Ordered for IV push Bumex  -- IVF: NS @ 10mL/hr

## 2023-01-19 NOTE — PROGRESS NOTE ADULT - PROBLEM SELECTOR PLAN 1
Asa, Statin, B-blocker, Chest PT,  Incentive spirometry, wound care and assessment.  Ambulate   D/c chest tubes as drainage decreases  Chest PT

## 2023-01-19 NOTE — PROGRESS NOTE ADULT - SUBJECTIVE AND OBJECTIVE BOX
Chief complaint  Patient is a 57y old  Male who presents with a chief complaint of Patient is a 57M /w PMH HTN, HLD, DM2, bipolar disorder, SHIN s/p uvula resection not on CPAP,  known CAD with MI presenting with SOB. Now s/p CABG x 4 1/17/23.    (19 Jan 2023 11:13)    Patient in bedside chair, looks comfortable, offers no complaints    Labs and Fingersticks  CAPILLARY BLOOD GLUCOSE  148 (19 Jan 2023 07:00)  134 (19 Jan 2023 06:00)  132 (19 Jan 2023 05:00)  123 (19 Jan 2023 04:30)  94 (19 Jan 2023 04:00)  102 (19 Jan 2023 03:00)  111 (19 Jan 2023 02:00)  105 (19 Jan 2023 01:00)  111 (19 Jan 2023 00:00)  120 (18 Jan 2023 23:00)  121 (18 Jan 2023 22:00)  133 (18 Jan 2023 21:00)  137 (18 Jan 2023 20:00)      POCT Blood Glucose.: 209 mg/dL (19 Jan 2023 10:29)  POCT Blood Glucose.: 179 mg/dL (19 Jan 2023 09:00)  POCT Blood Glucose.: 175 mg/dL (19 Jan 2023 08:17)  POCT Blood Glucose.: 148 mg/dL (19 Jan 2023 06:45)  POCT Blood Glucose.: 134 mg/dL (19 Jan 2023 05:57)  POCT Blood Glucose.: 132 mg/dL (19 Jan 2023 04:56)  POCT Blood Glucose.: 123 mg/dL (19 Jan 2023 04:40)  POCT Blood Glucose.: 94 mg/dL (19 Jan 2023 03:57)  POCT Blood Glucose.: 102 mg/dL (19 Jan 2023 02:54)  POCT Blood Glucose.: 111 mg/dL (19 Jan 2023 01:43)  POCT Blood Glucose.: 105 mg/dL (19 Jan 2023 00:54)  POCT Blood Glucose.: 111 mg/dL (18 Jan 2023 23:48)  POCT Blood Glucose.: 120 mg/dL (18 Jan 2023 23:11)  POCT Blood Glucose.: 121 mg/dL (18 Jan 2023 21:46)  POCT Blood Glucose.: 133 mg/dL (18 Jan 2023 21:05)  POCT Blood Glucose.: 137 mg/dL (18 Jan 2023 19:51)  POCT Blood Glucose.: 147 mg/dL (18 Jan 2023 18:44)  POCT Blood Glucose.: 138 mg/dL (18 Jan 2023 17:36)  POCT Blood Glucose.: 125 mg/dL (18 Jan 2023 16:22)  POCT Blood Glucose.: 117 mg/dL (18 Jan 2023 12:34)      Anion Gap, Serum: 12 (01-19 @ 00:26)  Anion Gap, Serum: 10 (01-18 @ 00:16)  Anion Gap, Serum: 9 (01-17 @ 18:03)      Calcium, Total Serum: 8.6 (01-19 @ 00:26)  Calcium, Total Serum: 8.7 (01-18 @ 00:16)  Calcium, Total Serum: 9.2 (01-17 @ 18:03)  Albumin, Serum: 3.6 (01-19 @ 00:26)  Albumin, Serum: 3.6 (01-18 @ 00:16)  Albumin, Serum: 3.6 (01-17 @ 18:03)    Alanine Aminotransferase (ALT/SGPT): 36 (01-19 @ 00:26)  Alanine Aminotransferase (ALT/SGPT): 44 (01-18 @ 00:16)  Alanine Aminotransferase (ALT/SGPT): 46 *H* (01-17 @ 18:03)  Alkaline Phosphatase, Serum: 58 (01-19 @ 00:26)  Alkaline Phosphatase, Serum: 51 (01-18 @ 00:16)  Alkaline Phosphatase, Serum: 54 (01-17 @ 18:03)  Aspartate Aminotransferase (AST/SGOT): 44 *H* (01-19 @ 00:26)  Aspartate Aminotransferase (AST/SGOT): 80 *H* (01-18 @ 00:16)  Aspartate Aminotransferase (AST/SGOT): 72 *H* (01-17 @ 18:03)        01-19    138  |  103  |  11  ----------------------------<  111<H>  4.0   |  23  |  0.56    Ca    8.6      19 Jan 2023 00:26  Phos  2.7     01-19  Mg     2.1     01-19    TPro  6.2  /  Alb  3.6  /  TBili  0.3  /  DBili  x   /  AST  44<H>  /  ALT  36  /  AlkPhos  58  01-19                        9.6    12.22 )-----------( 225      ( 19 Jan 2023 00:27 )             29.7     Medications  MEDICATIONS  (STANDING):  acetaminophen     Tablet .. 650 milliGRAM(s) Oral every 6 hours  aMIOdarone    Tablet 400 milliGRAM(s) Oral two times a day  amLODIPine   Tablet 10 milliGRAM(s) Oral daily  ascorbic acid 500 milliGRAM(s) Oral two times a day  aspirin enteric coated 325 milliGRAM(s) Oral daily  atorvastatin 80 milliGRAM(s) Oral at bedtime  bisacodyl Suppository 10 milliGRAM(s) Rectal once  buMETAnide Injectable 1 milliGRAM(s) IV Push <User Schedule>  chlorhexidine 2% Cloths 1 Application(s) Topical daily  divalproex DR 1500 milliGRAM(s) Oral at bedtime  enoxaparin Injectable 30 milliGRAM(s) SubCutaneous every 12 hours  gabapentin 100 milliGRAM(s) Oral every 8 hours  insulin glargine Injectable (LANTUS) 45 Unit(s) SubCutaneous at bedtime  insulin lispro (ADMELOG) corrective regimen sliding scale   SubCutaneous three times a day before meals  insulin lispro (ADMELOG) corrective regimen sliding scale   SubCutaneous at bedtime  insulin lispro Injectable (ADMELOG) 13 Unit(s) SubCutaneous three times a day with meals  iron sucrose IVPB 100 milliGRAM(s) IV Intermittent every 24 hours  metoprolol tartrate 50 milliGRAM(s) Oral every 12 hours  pantoprazole    Tablet 40 milliGRAM(s) Oral before breakfast  polyethylene glycol 3350 17 Gram(s) Oral daily  QUEtiapine 100 milliGRAM(s) Oral at bedtime  senna 2 Tablet(s) Oral at bedtime  sodium chloride 0.9%. 1000 milliLiter(s) (10 mL/Hr) IV Continuous <Continuous>      Physical Exam  General: Patient comfortable in bedside chair, pt oob and ambulating  Vital Signs Last 12 Hrs  T(F): 99.7 (01-19-23 @ 07:00), Max: 100.2 (01-19-23 @ 04:00)  HR: 81 (01-19-23 @ 11:00) (72 - 90)  BP: 119/67 (01-19-23 @ 09:00) (98/60 - 145/70)  BP(mean): 87 (01-19-23 @ 09:00) (74 - 100)  RR: 14 (01-19-23 @ 11:00) (12 - 21)  SpO2: 97% (01-19-23 @ 11:00) (93% - 98%)  CVS: S1S2, No murmurs  Respiratory: No wheezing, no crepitations  GI: Abdomen soft, bowel sounds positive  Musculoskeletal:  edema lower extremities.   Skin: No skin rashes, no ecchymosis, midsternal incision                  Chief complaint  Patient is a 57y old  Male who presents with a chief complaint of Patient is a 57M /w PMH HTN, HLD, DM2, bipolar disorder, SHIN s/p uvula resection not on CPAP,  known CAD with MI presenting with SOB. Now s/p CABG x 4 1/17/23.    (19 Jan 2023 11:13)    Patient in bedside chair, looks comfortable, offers no complaints    Labs and Fingersticks  CAPILLARY BLOOD GLUCOSE  148 (19 Jan 2023 07:00)  134 (19 Jan 2023 06:00)  132 (19 Jan 2023 05:00)  123 (19 Jan 2023 04:30)  94 (19 Jan 2023 04:00)  102 (19 Jan 2023 03:00)  111 (19 Jan 2023 02:00)  105 (19 Jan 2023 01:00)  111 (19 Jan 2023 00:00)  120 (18 Jan 2023 23:00)  121 (18 Jan 2023 22:00)  133 (18 Jan 2023 21:00)  137 (18 Jan 2023 20:00)      POCT Blood Glucose.: 209 mg/dL (19 Jan 2023 10:29)  POCT Blood Glucose.: 179 mg/dL (19 Jan 2023 09:00)  POCT Blood Glucose.: 175 mg/dL (19 Jan 2023 08:17)  POCT Blood Glucose.: 148 mg/dL (19 Jan 2023 06:45)  POCT Blood Glucose.: 134 mg/dL (19 Jan 2023 05:57)  POCT Blood Glucose.: 132 mg/dL (19 Jan 2023 04:56)  POCT Blood Glucose.: 123 mg/dL (19 Jan 2023 04:40)  POCT Blood Glucose.: 94 mg/dL (19 Jan 2023 03:57)  POCT Blood Glucose.: 102 mg/dL (19 Jan 2023 02:54)  POCT Blood Glucose.: 111 mg/dL (19 Jan 2023 01:43)  POCT Blood Glucose.: 105 mg/dL (19 Jan 2023 00:54)  POCT Blood Glucose.: 111 mg/dL (18 Jan 2023 23:48)  POCT Blood Glucose.: 120 mg/dL (18 Jan 2023 23:11)  POCT Blood Glucose.: 121 mg/dL (18 Jan 2023 21:46)  POCT Blood Glucose.: 133 mg/dL (18 Jan 2023 21:05)  POCT Blood Glucose.: 137 mg/dL (18 Jan 2023 19:51)  POCT Blood Glucose.: 147 mg/dL (18 Jan 2023 18:44)  POCT Blood Glucose.: 138 mg/dL (18 Jan 2023 17:36)  POCT Blood Glucose.: 125 mg/dL (18 Jan 2023 16:22)  POCT Blood Glucose.: 117 mg/dL (18 Jan 2023 12:34)      Anion Gap, Serum: 12 (01-19 @ 00:26)  Anion Gap, Serum: 10 (01-18 @ 00:16)  Anion Gap, Serum: 9 (01-17 @ 18:03)      Calcium, Total Serum: 8.6 (01-19 @ 00:26)  Calcium, Total Serum: 8.7 (01-18 @ 00:16)  Calcium, Total Serum: 9.2 (01-17 @ 18:03)  Albumin, Serum: 3.6 (01-19 @ 00:26)  Albumin, Serum: 3.6 (01-18 @ 00:16)  Albumin, Serum: 3.6 (01-17 @ 18:03)    Alanine Aminotransferase (ALT/SGPT): 36 (01-19 @ 00:26)  Alanine Aminotransferase (ALT/SGPT): 44 (01-18 @ 00:16)  Alanine Aminotransferase (ALT/SGPT): 46 *H* (01-17 @ 18:03)  Alkaline Phosphatase, Serum: 58 (01-19 @ 00:26)  Alkaline Phosphatase, Serum: 51 (01-18 @ 00:16)  Alkaline Phosphatase, Serum: 54 (01-17 @ 18:03)  Aspartate Aminotransferase (AST/SGOT): 44 *H* (01-19 @ 00:26)  Aspartate Aminotransferase (AST/SGOT): 80 *H* (01-18 @ 00:16)  Aspartate Aminotransferase (AST/SGOT): 72 *H* (01-17 @ 18:03)        01-19    138  |  103  |  11  ----------------------------<  111<H>  4.0   |  23  |  0.56    Ca    8.6      19 Jan 2023 00:26  Phos  2.7     01-19  Mg     2.1     01-19    TPro  6.2  /  Alb  3.6  /  TBili  0.3  /  DBili  x   /  AST  44<H>  /  ALT  36  /  AlkPhos  58  01-19                        9.6    12.22 )-----------( 225      ( 19 Jan 2023 00:27 )             29.7     Medications  MEDICATIONS  (STANDING):  acetaminophen     Tablet .. 650 milliGRAM(s) Oral every 6 hours  aMIOdarone    Tablet 400 milliGRAM(s) Oral two times a day  amLODIPine   Tablet 10 milliGRAM(s) Oral daily  ascorbic acid 500 milliGRAM(s) Oral two times a day  aspirin enteric coated 325 milliGRAM(s) Oral daily  atorvastatin 80 milliGRAM(s) Oral at bedtime  bisacodyl Suppository 10 milliGRAM(s) Rectal once  buMETAnide Injectable 1 milliGRAM(s) IV Push <User Schedule>  chlorhexidine 2% Cloths 1 Application(s) Topical daily  divalproex DR 1500 milliGRAM(s) Oral at bedtime  enoxaparin Injectable 30 milliGRAM(s) SubCutaneous every 12 hours  gabapentin 100 milliGRAM(s) Oral every 8 hours  insulin glargine Injectable (LANTUS) 45 Unit(s) SubCutaneous at bedtime  insulin lispro (ADMELOG) corrective regimen sliding scale   SubCutaneous three times a day before meals  insulin lispro (ADMELOG) corrective regimen sliding scale   SubCutaneous at bedtime  insulin lispro Injectable (ADMELOG) 13 Unit(s) SubCutaneous three times a day with meals  iron sucrose IVPB 100 milliGRAM(s) IV Intermittent every 24 hours  metoprolol tartrate 50 milliGRAM(s) Oral every 12 hours  pantoprazole    Tablet 40 milliGRAM(s) Oral before breakfast  polyethylene glycol 3350 17 Gram(s) Oral daily  QUEtiapine 100 milliGRAM(s) Oral at bedtime  senna 2 Tablet(s) Oral at bedtime  sodium chloride 0.9%. 1000 milliLiter(s) (10 mL/Hr) IV Continuous <Continuous>      Physical Exam  General: Patient comfortable in bedside chair, pt oob and ambulating  Vital Signs Last 12 Hrs  T(F): 99.7 (01-19-23 @ 07:00), Max: 100.2 (01-19-23 @ 04:00)  HR: 81 (01-19-23 @ 11:00) (72 - 90)  BP: 119/67 (01-19-23 @ 09:00) (98/60 - 145/70)  BP(mean): 87 (01-19-23 @ 09:00) (74 - 100)  RR: 14 (01-19-23 @ 11:00) (12 - 21)  SpO2: 97% (01-19-23 @ 11:00) (93% - 98%)  CVS: S1S2, No murmurs  Respiratory: No wheezing, no crepitations  GI: Abdomen soft, bowel sounds positive  Musculoskeletal:  edema lower extremities.   Skin: No skin rashes, no ecchymosis, midsternal incision

## 2023-01-19 NOTE — DIETITIAN INITIAL EVALUATION ADULT - PERTINENT MEDS FT
MEDICATIONS  (STANDING):  acetaminophen     Tablet .. 650 milliGRAM(s) Oral every 6 hours  aMIOdarone    Tablet 400 milliGRAM(s) Oral two times a day  amLODIPine   Tablet 10 milliGRAM(s) Oral daily  ascorbic acid 500 milliGRAM(s) Oral two times a day  aspirin enteric coated 325 milliGRAM(s) Oral daily  atorvastatin 80 milliGRAM(s) Oral at bedtime  bisacodyl Suppository 10 milliGRAM(s) Rectal once  buMETAnide Injectable 1 milliGRAM(s) IV Push <User Schedule>  chlorhexidine 2% Cloths 1 Application(s) Topical daily  divalproex DR 1500 milliGRAM(s) Oral at bedtime  enoxaparin Injectable 30 milliGRAM(s) SubCutaneous every 12 hours  gabapentin 100 milliGRAM(s) Oral every 8 hours  insulin glargine Injectable (LANTUS) 45 Unit(s) SubCutaneous at bedtime  insulin lispro (ADMELOG) corrective regimen sliding scale   SubCutaneous three times a day before meals  insulin lispro (ADMELOG) corrective regimen sliding scale   SubCutaneous at bedtime  insulin lispro Injectable (ADMELOG) 13 Unit(s) SubCutaneous three times a day with meals  iron sucrose IVPB 100 milliGRAM(s) IV Intermittent every 24 hours  metoprolol tartrate 50 milliGRAM(s) Oral every 12 hours  pantoprazole    Tablet 40 milliGRAM(s) Oral before breakfast  polyethylene glycol 3350 17 Gram(s) Oral daily  QUEtiapine 100 milliGRAM(s) Oral at bedtime  senna 2 Tablet(s) Oral at bedtime  sodium chloride 0.9%. 1000 milliLiter(s) (10 mL/Hr) IV Continuous <Continuous>    MEDICATIONS  (PRN):  HYDROmorphone  Injectable 0.5 milliGRAM(s) IV Push every 6 hours PRN Breakthrough Pain  oxyCODONE    IR 5 milliGRAM(s) Oral every 4 hours PRN Moderate Pain (4 - 6)  oxyCODONE    IR 10 milliGRAM(s) Oral every 4 hours PRN Severe Pain (7 - 10)

## 2023-01-19 NOTE — PROGRESS NOTE ADULT - SUBJECTIVE AND OBJECTIVE BOX
Patient seen and examined at the bedside.    Remained critically ill on continuous ICU monitoring.      Brief Summary:  56 yo M with CAD s/p C4L 1/17/23.       24 Hour events:      OBJECTIVE:  Vital Signs Last 24 Hrs  T(C): 37.9 (19 Jan 2023 04:00), Max: 38.3 (18 Jan 2023 20:00)  T(F): 100.2 (19 Jan 2023 04:00), Max: 100.9 (18 Jan 2023 20:00)  HR: 72 (19 Jan 2023 07:00) (72 - 96)  BP: 98/60 (19 Jan 2023 06:00) (98/60 - 150/77)  BP(mean): 74 (19 Jan 2023 06:00) (74 - 105)  RR: 13 (19 Jan 2023 07:00) (9 - 25)  SpO2: 98% (19 Jan 2023 07:00) (93% - 99%)    Parameters below as of 19 Jan 2023 04:00  Patient On (Oxygen Delivery Method): nasal cannula  O2 Flow (L/min): 6      Physical Exam:   General: Sitting in chair, no acute distress  Neurology: Sleepy, but follows commands, no focal deficits.   Eyes: Bilateral pupils equal and reactive   ENT/Neck:  Neck supple, trachea midline   Respiratory: Decreased at bases  CV: Sinus rhythm  Abdominal: Soft, NT, ND +BS   Extremities: Warm          -------------------------------------------------------------------------------------------------------------------------------  Labs:                          9.6    12.22 )-----------( 225      ( 19 Jan 2023 00:27 )             29.7     01-19    138  |  103  |  11  ----------------------------<  111<H>  4.0   |  23  |  0.56    Ca    8.6      19 Jan 2023 00:26  Phos  2.7     01-19  Mg     2.1     01-19    TPro  6.2  /  Alb  3.6  /  TBili  0.3  /  DBili  x   /  AST  44<H>  /  ALT  36  /  AlkPhos  58  01-19    LIVER FUNCTIONS - ( 19 Jan 2023 00:26 )  Alb: 3.6 g/dL / Pro: 6.2 g/dL / ALK PHOS: 58 U/L / ALT: 36 U/L / AST: 44 U/L / GGT: x           PT/INR - ( 17 Jan 2023 18:02 )   PT: 15.0 sec;   INR: 1.29 ratio         PTT - ( 17 Jan 2023 18:02 )  PTT:28.7 sec  ABG - ( 18 Jan 2023 23:55 )  pH, Arterial: 7.45  pH, Blood: x     /  pCO2: 40    /  pO2: 101   / HCO3: 28    / Base Excess: 3.5   /  SaO2: 97.1              ------------------------------------------------------------------------------------------------------------------------------  Assessment:  Patient is a 57M /w CAD s/p CABG on 1/17/23    At high risk for hemodynamic instability  Post op acute respiratory insufficiency  Acute blood loss anemia      Plan:   ***Neuro***  Postoperative acute pain control with Tylenol, Gabapentin, and prns.  Home meds for bipolar disease - VPA and Haldol   LIkely resume Seroquel tomorrow.    ***Cardiovascular***  At high risk for hemodynamic instability and cardiac arrhythmias.  Beta blocker for rate control   Amiodarone for afib prophylaxis   ASA/Stain daily for CAD  Monitor chest tube output.    ***Pulmonary***  Postoperative acute respiratory insufficiency   High flow nasal cannula --> 5L NC  Deep breathing and coughing exercises.  Wean oxygen as able.    ***GI***  Tolerating consistent carb diet   Protonix for stress ulcer prophylaxis   Bowel regimen with Miralax and Senna     ***Renal***  Trend creatinine  Boyd catheter for strict I/O measurements.   Replete electrolytes as indicated.    ***ID***  No antibiotic coverage    ***Endocrine***  Hx of DM2  Insulin gtt as needed for hyperglycemia.    ***Hematology***  Acute blood loss anemia - no current plan for transfusion  Lovenox for VTE prophylaxis       Patient requires continuous monitoring with bedside rhythm monitoring, pulse oximetry monitoring, and continuous central venous and arterial pressure monitoring; and intermittent blood gas analysis. Care plan discussed with the ICU care team.   Patient remained critical, at risk for life threatening decompensation.    I have spent 30 minutes providing critical care management to this patient.    By signing my name below, I, Maria D'Amico, attest that this documentation has been prepared under the direction and in the presence of Shani Berg MD  Electronically signed: Maria D'Amico, Scribe, 01-19-23 @ 07:30    I, Shani Berg MD, personally performed the services described in this documentation. all medical record entries made by the scribe were at my direction and in my presence. I have reviewed the chart and agree that the record reflects my personal performance and is accurate and complete  Electronically signed: Shani Berg MD Patient seen and examined at the bedside.    Remained critically ill on continuous ICU monitoring.      Brief Summary:  58 yo M with CAD s/p C4L 1/17/23.       24 Hour events:  - Diuresis with Bumex this AM, plan to start standing Bumex    OBJECTIVE:  Vital Signs Last 24 Hrs  T(C): 37.9 (19 Jan 2023 04:00), Max: 38.3 (18 Jan 2023 20:00)  T(F): 100.2 (19 Jan 2023 04:00), Max: 100.9 (18 Jan 2023 20:00)  HR: 72 (19 Jan 2023 07:00) (72 - 96)  BP: 98/60 (19 Jan 2023 06:00) (98/60 - 150/77)  BP(mean): 74 (19 Jan 2023 06:00) (74 - 105)  RR: 13 (19 Jan 2023 07:00) (9 - 25)  SpO2: 98% (19 Jan 2023 07:00) (93% - 99%)    Parameters below as of 19 Jan 2023 04:00  Patient On (Oxygen Delivery Method): nasal cannula  O2 Flow (L/min): 6      Physical Exam:   General: Sitting in chair, no acute distress  Neurology: Sleepy, but follows commands, no focal deficits.   Eyes: Bilateral pupils equal and reactive   ENT/Neck:  Neck supple, trachea midline   Respiratory: Decreased at bases  CV: Sinus rhythm  Abdominal: Soft, NT, ND +BS   Extremities: Warm          -------------------------------------------------------------------------------------------------------------------------------  Labs:                          9.6    12.22 )-----------( 225      ( 19 Jan 2023 00:27 )             29.7     01-19    138  |  103  |  11  ----------------------------<  111<H>  4.0   |  23  |  0.56    Ca    8.6      19 Jan 2023 00:26  Phos  2.7     01-19  Mg     2.1     01-19    TPro  6.2  /  Alb  3.6  /  TBili  0.3  /  DBili  x   /  AST  44<H>  /  ALT  36  /  AlkPhos  58  01-19    LIVER FUNCTIONS - ( 19 Jan 2023 00:26 )  Alb: 3.6 g/dL / Pro: 6.2 g/dL / ALK PHOS: 58 U/L / ALT: 36 U/L / AST: 44 U/L / GGT: x           PT/INR - ( 17 Jan 2023 18:02 )   PT: 15.0 sec;   INR: 1.29 ratio         PTT - ( 17 Jan 2023 18:02 )  PTT:28.7 sec  ABG - ( 18 Jan 2023 23:55 )  pH, Arterial: 7.45  pH, Blood: x     /  pCO2: 40    /  pO2: 101   / HCO3: 28    / Base Excess: 3.5   /  SaO2: 97.1              ------------------------------------------------------------------------------------------------------------------------------  Assessment:  Patient is a 57M /w CAD s/p CABG on 1/17/23    At high risk for hemodynamic instability  Post op acute respiratory insufficiency  Acute blood loss anemia      Plan:   ***Neuro***  Postoperative acute pain control with Tylenol, Gabapentin, and prns.  Home meds for bipolar disease - plan to increase Depakote and hold Haldol   Plan to restart Seroquel    ***Cardiovascular***  At high risk for hemodynamic instability and cardiac arrhythmias.  Beta blocker for rate control   Norvasc for blood pressure management  Amiodarone for afib prophylaxis   ASA/Stain daily for CAD  Monitor chest tube output.    ***Pulmonary***  Postoperative acute respiratory insufficiency- 4L NC  Deep breathing and coughing exercises.  Wean oxygen as able.    ***GI***  Tolerating consistent carb diet   Protonix for stress ulcer prophylaxis   Bowel regimen with Miralax and Senna     ***Renal***  Trend creatinine  Boyd catheter for strict I/O measurements.   Replete electrolytes as indicated.  Diuresis with Lasix last night and Bumex this morning  Plan to start standing Bumex    ***ID***  No antibiotic coverage    ***Endocrine***  Hx of DM2  Insulin gtt as needed for hyperglycemia.    ***Hematology***  Acute blood loss anemia - no current plan for transfusion  Lovenox BID for VTE prophylaxis       Patient requires continuous monitoring with bedside rhythm monitoring, pulse oximetry monitoring, and continuous central venous and arterial pressure monitoring; and intermittent blood gas analysis. Care plan discussed with the ICU care team.   Patient remained critical, at risk for life threatening decompensation.    I have spent 30 minutes providing critical care management to this patient.    By signing my name below, I, Maria D'Amico, attest that this documentation has been prepared under the direction and in the presence of Shani Berg MD  Electronically signed: Maria D'Amico, Scribe, 01-19-23 @ 07:30    I, Shani Berg MD, personally performed the services described in this documentation. all medical record entries made by the panteraibe were at my direction and in my presence. I have reviewed the chart and agree that the record reflects my personal performance and is accurate and complete  Electronically signed: Shani Berg MD Patient seen and examined at the bedside.    Remains critically ill on continuous ICU monitoring.      Brief Summary:  56 yo M with CAD s/p CABG x 4 on 1/17/23.       24 Hour events:  - No acute events overnight.  - Weaned from high flow nasal cannula to nasal cannula  - Diuresing      Objective:  Vital Signs Last 24 Hrs  T(C): 37.9 (19 Jan 2023 04:00), Max: 38.3 (18 Jan 2023 20:00)  T(F): 100.2 (19 Jan 2023 04:00), Max: 100.9 (18 Jan 2023 20:00)  HR: 72 (19 Jan 2023 07:00) (72 - 96)  BP: 98/60 (19 Jan 2023 06:00) (98/60 - 150/77)  BP(mean): 74 (19 Jan 2023 06:00) (74 - 105)  RR: 13 (19 Jan 2023 07:00) (9 - 25)  SpO2: 98% (19 Jan 2023 07:00) (93% - 99%)    Parameters below as of 19 Jan 2023 04:00  Patient On (Oxygen Delivery Method): nasal cannula  O2 Flow (L/min): 6      Physical Exam:   General: Sitting in chair, no acute distress  Neurology: Awake, follows commands, no focal deficits.   Eyes: Bilateral pupils equal and reactive   ENT/Neck:  Neck supple, trachea midline   Respiratory: Decreased at bases  CV: Sinus rhythm  Abdominal: Soft, NT, ND +BS   Extremities: Warm          -------------------------------------------------------------------------------------------------------------------------------  Labs:                          9.6    12.22 )-----------( 225      ( 19 Jan 2023 00:27 )             29.7     01-19    138  |  103  |  11  ----------------------------<  111<H>  4.0   |  23  |  0.56    Ca    8.6      19 Jan 2023 00:26  Phos  2.7     01-19  Mg     2.1     01-19    TPro  6.2  /  Alb  3.6  /  TBili  0.3  /  DBili  x   /  AST  44<H>  /  ALT  36  /  AlkPhos  58  01-19    LIVER FUNCTIONS - ( 19 Jan 2023 00:26 )  Alb: 3.6 g/dL / Pro: 6.2 g/dL / ALK PHOS: 58 U/L / ALT: 36 U/L / AST: 44 U/L / GGT: x           PT/INR - ( 17 Jan 2023 18:02 )   PT: 15.0 sec;   INR: 1.29 ratio         PTT - ( 17 Jan 2023 18:02 )  PTT:28.7 sec  ABG - ( 18 Jan 2023 23:55 )  pH, Arterial: 7.45  pH, Blood: x     /  pCO2: 40    /  pO2: 101   / HCO3: 28    / Base Excess: 3.5   /  SaO2: 97.1          ------------------------------------------------------------------------------------------------------------------------------  Assessment:  Patient is a 57M /w CAD s/p CABG on 1/17/23    At high risk for hemodynamic instability  Post op acute respiratory insufficiency  Acute blood loss anemia      Plan:   ***Neuro***  Postoperative acute pain control with Tylenol, Gabapentin, and prns.  Home meds for bipolar disease - plan to increase Depakote to home dose.  Start Seroquel this evening (lower than home dose)    ***Cardiovascular***  At high risk for hemodynamic instability and cardiac arrhythmias.  Beta blocker for rate control   Norvasc for blood pressure management  Amiodarone for a fib prophylaxis   ASA/Stain daily    ***Pulmonary***  Postoperative acute respiratory insufficiency  Deep breathing and coughing exercises.  Wean oxygen as able.    ***GI***  Tolerating consistent carb diet   Protonix for stress ulcer prophylaxis   Bowel regimen with Miralax and Senna     ***Renal***  Trend creatinine  Replete electrolytes as indicated.  Diuresis with Bumex    ***ID***  No acute issues.    ***Endocrine***  Hx of DM2  Insulin gtt - will ask Endocrine service for help with transition to longer acting insulin.    ***Hematology***  Acute blood loss anemia - no current plan for transfusion  Lovenox BID for VTE prophylaxis       Patient requires continuous monitoring with bedside rhythm monitoring, pulse oximetry monitoring, and continuous central venous and arterial pressure monitoring; and intermittent blood gas analysis. Care plan discussed with the ICU care team.   Patient remains critical, at risk for life threatening decompensation.    I have spent 35 minutes providing critical care management to this patient.    By signing my name below, I, Maria D'Amico, attest that this documentation has been prepared under the direction and in the presence of Shani Berg MD  Electronically signed: Maria D'Amico, Scribe, 01-19-23 @ 07:30    I, Shani Berg MD, personally performed the services described in this documentation. all medical record entries made by the panteraibhalina were at my direction and in my presence. I have reviewed the chart and agree that the record reflects my personal performance and is accurate and complete  Electronically signed: Shani Berg MD

## 2023-01-19 NOTE — PROGRESS NOTE ADULT - SUBJECTIVE AND OBJECTIVE BOX
Date of Service  : 01-19-23     INTERVAL HPI/OVERNIGHT EVENTS: I walked . Sister in room.   Vital Signs Last 24 Hrs  T(C): 36.8 (19 Jan 2023 19:16), Max: 38.3 (19 Jan 2023 00:00)  T(F): 98.3 (19 Jan 2023 19:16), Max: 100.9 (19 Jan 2023 00:00)  HR: 76 (19 Jan 2023 19:16) (72 - 90)  BP: 113/66 (19 Jan 2023 19:16) (98/60 - 145/70)  BP(mean): 83 (19 Jan 2023 19:16) (74 - 100)  RR: 18 (19 Jan 2023 19:16) (12 - 22)  SpO2: 98% (19 Jan 2023 19:16) (93% - 98%)    Parameters below as of 19 Jan 2023 19:16  Patient On (Oxygen Delivery Method): nasal cannula      I&O's Summary    18 Jan 2023 07:01  -  19 Jan 2023 07:00  --------------------------------------------------------  IN: 780 mL / OUT: 2530 mL / NET: -1750 mL    19 Jan 2023 07:01  -  19 Jan 2023 22:09  --------------------------------------------------------  IN: 542 mL / OUT: 790 mL / NET: -248 mL      MEDICATIONS  (STANDING):  acetaminophen     Tablet .. 650 milliGRAM(s) Oral every 6 hours  aMIOdarone    Tablet 400 milliGRAM(s) Oral two times a day  amLODIPine   Tablet 10 milliGRAM(s) Oral daily  ascorbic acid 500 milliGRAM(s) Oral two times a day  aspirin enteric coated 325 milliGRAM(s) Oral daily  atorvastatin 80 milliGRAM(s) Oral at bedtime  bisacodyl Suppository 10 milliGRAM(s) Rectal once  buMETAnide Injectable 1 milliGRAM(s) IV Push <User Schedule>  chlorhexidine 2% Cloths 1 Application(s) Topical daily  clopidogrel Tablet 75 milliGRAM(s) Oral daily  divalproex DR 1500 milliGRAM(s) Oral at bedtime  enoxaparin Injectable 30 milliGRAM(s) SubCutaneous every 12 hours  escitalopram 10 milliGRAM(s) Oral daily  gabapentin 100 milliGRAM(s) Oral every 8 hours  insulin glargine Injectable (LANTUS) 52 Unit(s) SubCutaneous at bedtime  insulin lispro (ADMELOG) corrective regimen sliding scale   SubCutaneous three times a day before meals  insulin lispro (ADMELOG) corrective regimen sliding scale   SubCutaneous at bedtime  insulin lispro Injectable (ADMELOG) 18 Unit(s) SubCutaneous three times a day with meals  iron sucrose IVPB 100 milliGRAM(s) IV Intermittent every 24 hours  metoprolol tartrate 50 milliGRAM(s) Oral every 12 hours  pantoprazole    Tablet 40 milliGRAM(s) Oral before breakfast  polyethylene glycol 3350 17 Gram(s) Oral daily  QUEtiapine 100 milliGRAM(s) Oral at bedtime  senna 2 Tablet(s) Oral at bedtime  sodium chloride 0.9%. 1000 milliLiter(s) (10 mL/Hr) IV Continuous <Continuous>    MEDICATIONS  (PRN):  oxyCODONE    IR 5 milliGRAM(s) Oral every 4 hours PRN Moderate Pain (4 - 6)  oxyCODONE    IR 10 milliGRAM(s) Oral every 4 hours PRN Severe Pain (7 - 10)    LABS:                        9.6    12.22 )-----------( 225      ( 19 Jan 2023 00:27 )             29.7     01-19    138  |  103  |  11  ----------------------------<  111<H>  4.0   |  23  |  0.56    Ca    8.6      19 Jan 2023 00:26  Phos  2.7     01-19  Mg     2.1     01-19    TPro  6.2  /  Alb  3.6  /  TBili  0.3  /  DBili  x   /  AST  44<H>  /  ALT  36  /  AlkPhos  58  01-19        CAPILLARY BLOOD GLUCOSE  148 (19 Jan 2023 07:00)  134 (19 Jan 2023 06:00)  132 (19 Jan 2023 05:00)  123 (19 Jan 2023 04:30)  94 (19 Jan 2023 04:00)  102 (19 Jan 2023 03:00)  111 (19 Jan 2023 02:00)  105 (19 Jan 2023 01:00)  111 (19 Jan 2023 00:00)  120 (18 Jan 2023 23:00)      POCT Blood Glucose.: 207 mg/dL (19 Jan 2023 21:54)  POCT Blood Glucose.: 217 mg/dL (19 Jan 2023 16:39)  POCT Blood Glucose.: 207 mg/dL (19 Jan 2023 13:00)  POCT Blood Glucose.: 209 mg/dL (19 Jan 2023 10:29)  POCT Blood Glucose.: 179 mg/dL (19 Jan 2023 09:00)  POCT Blood Glucose.: 175 mg/dL (19 Jan 2023 08:17)  POCT Blood Glucose.: 148 mg/dL (19 Jan 2023 06:45)  POCT Blood Glucose.: 134 mg/dL (19 Jan 2023 05:57)  POCT Blood Glucose.: 132 mg/dL (19 Jan 2023 04:56)  POCT Blood Glucose.: 123 mg/dL (19 Jan 2023 04:40)  POCT Blood Glucose.: 94 mg/dL (19 Jan 2023 03:57)  POCT Blood Glucose.: 102 mg/dL (19 Jan 2023 02:54)  POCT Blood Glucose.: 111 mg/dL (19 Jan 2023 01:43)  POCT Blood Glucose.: 105 mg/dL (19 Jan 2023 00:54)  POCT Blood Glucose.: 111 mg/dL (18 Jan 2023 23:48)  POCT Blood Glucose.: 120 mg/dL (18 Jan 2023 23:11)      ABG - ( 19 Jan 2023 13:56 )  pH, Arterial: 7.43  pH, Blood: x     /  pCO2: 43    /  pO2: 100   / HCO3: 28    / Base Excess: 3.8   /  SaO2: 97.4                REVIEW OF SYSTEMS:  CONSTITUTIONAL: No fever, weight loss, or fatigue  EYES: No eye pain, visual disturbances, or discharge  ENMT:  No difficulty hearing, tinnitus, vertigo; No sinus or throat pain  NECK: No pain or stiffness  RESPIRATORY: No cough, wheezing, chills or hemoptysis; No shortness of breath  CARDIOVASCULAR: No chest pain, palpitations, dizziness, or leg swelling  GASTROINTESTINAL: No abdominal or epigastric pain. No nausea, vomiting, or hematemesis; No diarrhea or constipation. No melena or hematochezia.  GENITOURINARY: No dysuria, frequency, hematuria, or incontinence  NEUROLOGICAL: No headaches, memory loss, loss of strength, numbness, or tremors      RADIOLOGY & ADDITIONAL TESTS:    Consultant(s) Notes Reviewed:  [x ] YES  [ ] NO    PHYSICAL EXAM:  GENERAL: NAD, well-groomed, well-developed,not in any distress ,  HEAD:  Atraumatic, Normocephalic  NECK: Supple, No JVD, Normal thyroid  NERVOUS SYSTEM:  Alert & Oriented X3, No focal deficit   CHEST/LUNG: Good air entry bilateral with CT   HEART: Regular rate and rhythm; No murmurs, rubs, or gallops  ABDOMEN: Soft, Nontender, Nondistended; Bowel sounds present  EXTREMITIES:  2+ Peripheral Pulses, No clubbing, cyanosis, or edema      Care Discussed with Consultants/Other Providers [ x] YES  [ ] NO

## 2023-01-19 NOTE — DIETITIAN INITIAL EVALUATION ADULT - NSFNSGIIOFT_GEN_A_CORE
Pt denies any N/V, last BM prior to surgery on 1/15, currently on bowel regimen.     01-18-23 @ 07:01  -  01-19-23 @ 07:00  --------------------------------------------------------  OUT:    Chest Tube (mL): 190 mL    Chest Tube (mL): 180 mL    Chest Tube (mL): 100 mL  Total OUT: 470 mL    Total NET: -470 mL      01-19-23 @ 07:01  -  01-19-23 @ 11:13  --------------------------------------------------------  OUT:    Chest Tube (mL): 30 mL    Chest Tube (mL): 20 mL    Chest Tube (mL): 85 mL  Total OUT: 135 mL    Total NET: -135 mL

## 2023-01-19 NOTE — DIETITIAN INITIAL EVALUATION ADULT - ORAL INTAKE PTA/DIET HISTORY
Brief interview conducted as pt receiving bedside care/prepping for transition to SDU. PTA pt reports good appetite and PO intake, following regular diet. No noted/reported micronutrient supplementation PTA. NKFA or intolerances reported. Denies any difficulty chewing/swallowing at baseline.

## 2023-01-19 NOTE — DIETITIAN INITIAL EVALUATION ADULT - PERSON TAUGHT/METHOD
RD provided brief nutrition therapy for s/p CABG with midsternal incision. Emphasized importance of adequate lean protein intake and optimal blood glucose levels for wound healing. Advised pt to limit concentrated sweets, portion control, and importance of fiber intake. Pt made aware RD to remain available./verbal instruction/teach back - (Patient repeats in own words)/patient instructed/spouse instructed

## 2023-01-19 NOTE — DIETITIAN INITIAL EVALUATION ADULT - PERTINENT LABORATORY DATA
01-19    138  |  103  |  11  ----------------------------<  111<H>  4.0   |  23  |  0.56    Ca    8.6      19 Jan 2023 00:26  Phos  2.7     01-19  Mg     2.1     01-19    TPro  6.2  /  Alb  3.6  /  TBili  0.3  /  DBili  x   /  AST  44<H>  /  ALT  36  /  AlkPhos  58  01-19  POCT Blood Glucose.: 209 mg/dL (01-19-23 @ 10:29)  A1C with Estimated Average Glucose Result: 8.2 % (01-12-23 @ 07:14)  A1C with Estimated Average Glucose Result: 8.4 % (01-12-23 @ 00:00)

## 2023-01-19 NOTE — PROGRESS NOTE ADULT - PROBLEM SELECTOR PLAN 1
Transitioned off IV insulin gtt  Will start Lantus to 45 units at bed time.  Will start Humalog to 13 units before each meal in addition to Humalog low correction scale coverage.  Suggest to start insulin pen injection teaching, may DC home on insulin.  Patient counseled for compliance with consistent low carb diet and exercise as tolerated outpatient.   Will continue monitoring  blood sugars trend, will Follow up. Transitioned off IV insulin gtt  Will start Lantus  52 units at bed time.  Will start Humalog 18 units before each meal in addition to Humalog low correction scale coverage.  Suggest to start insulin pen injection teaching, may DC home on insulin.  Patient counseled for compliance with consistent low carb diet and exercise as tolerated outpatient.   Will continue monitoring  blood sugars trend, will Follow up.

## 2023-01-19 NOTE — DIETITIAN INITIAL EVALUATION ADULT - ETIOLOGY
endocrine dysfunction, poor glycemic control, stress hyperglycemia  increased physiological demand for nutrients secondary to post-operative healing

## 2023-01-19 NOTE — DIETITIAN INITIAL EVALUATION ADULT - ADD RECOMMEND
-- Continue ascorbic acid as feasible, consider multivitamin supplementation.  -- Provide encouragement with PO intake, menu selections, and assistance with meals as needed.  -- Reinforce nutrition education as needed - RD remains available.   -- Continue to monitor nutritional intake, labs, weights, BM, skin, clinical course.

## 2023-01-19 NOTE — DIETITIAN INITIAL EVALUATION ADULT - FLUID ACCUMULATION
+1 generalized, 2+ left arm; right arm; left wrist; right wrist; left hand; right hand, 3+ left leg; right leg; left foot; right foot

## 2023-01-19 NOTE — DIETITIAN INITIAL EVALUATION ADULT - ENERGY INTAKE
Prior to surgery pt reports good appetite and PO intake however currently reporting diminished appetite post-op (current POD 2). Amenable to oral nutrition supplements to promote PO intake.  Poor (<50%)

## 2023-01-19 NOTE — PROGRESS NOTE ADULT - ASSESSMENT
Assessment  DMT2: 57y Male with DM T2 with hyperglycemia admitted with chest pain  A1C is 8.1%, patient was on insulin at home, was on  basal bolus and insulin coverage, blood sugars improving, now transitioned off IV insulin drip to SQ insulin and basal insulin, PO diet advanced, tolerating intake.  CAD: CABG, on medications, monitored, asymptomatic.  HTN: On medications, off nicardipine gtt, monitored, asymptomatic.                Guerrero Sequeira MD  Cell:  [phone]  Office: [phone]               Assessment  DMT2: 57y Male with DM T2 with hyperglycemia admitted with chest pain  A1C is 8.1%, patient was on insulin at home, blood sugars improving, now transitioned off IV insulin drip to SQ insulin and basal insulin, PO diet advanced, tolerating intake.  CAD: CABG, on medications, monitored, asymptomatic.  HTN: On medications, off nicardipine gtt, monitored, asymptomatic.                Guerrero Sequeira MD  Cell:  [phone]  Office: [phone]

## 2023-01-19 NOTE — PROGRESS NOTE ADULT - SUBJECTIVE AND OBJECTIVE BOX
VITAL SIGNS    Telemetry:      Vital Signs Last 24 Hrs  T(C): 37.6 (23 @ 07:00), Max: 38.3 (23 @ 20:00)  T(F): 99.7 (23 @ 07:00), Max: 100.9 (23 @ 20:00)  HR: 78 (23 @ 14:00) (72 - 91)  BP: 119/67 (23 @ 09:00) (98/60 - 150/77)  RR: 15 (23 @ 14:00) (12 - 25)  SpO2: 98% (23 @ 14:00) (93% - 99%)                    07:  -   @ 07:00  --------------------------------------------------------  IN: 780 mL / OUT: 2530 mL / NET: -1750 mL     @ 07:01  -   @ 16:17  --------------------------------------------------------  IN: 102 mL / OUT: 705 mL / NET: -603 mL          Daily     Daily Weight in k.1 (2023 00:00)            CAPILLARY BLOOD GLUCOSE  148 (2023 07:00)  134 (2023 06:00)  132 (2023 05:00)  123 (2023 04:30)  94 (2023 04:00)  102 (2023 03:00)  111 (2023 02:00)  105 (2023 01:00)  111 (2023 00:00)  120 (2023 23:00)  121 (2023 22:00)  133 (2023 21:00)  137 (2023 20:00)      POCT Blood Glucose.: 207 mg/dL (2023 13:00)  POCT Blood Glucose.: 209 mg/dL (2023 10:29)  POCT Blood Glucose.: 179 mg/dL (2023 09:00)  POCT Blood Glucose.: 175 mg/dL (2023 08:17)  POCT Blood Glucose.: 148 mg/dL (2023 06:45)  POCT Blood Glucose.: 134 mg/dL (2023 05:57)  POCT Blood Glucose.: 132 mg/dL (2023 04:56)  POCT Blood Glucose.: 123 mg/dL (2023 04:40)  POCT Blood Glucose.: 94 mg/dL (2023 03:57)  POCT Blood Glucose.: 102 mg/dL (2023 02:54)  POCT Blood Glucose.: 111 mg/dL (2023 01:43)  POCT Blood Glucose.: 105 mg/dL (2023 00:54)  POCT Blood Glucose.: 111 mg/dL (2023 23:48)  POCT Blood Glucose.: 120 mg/dL (2023 23:11)  POCT Blood Glucose.: 121 mg/dL (2023 21:46)  POCT Blood Glucose.: 133 mg/dL (2023 21:05)  POCT Blood Glucose.: 137 mg/dL (2023 19:51)  POCT Blood Glucose.: 147 mg/dL (2023 18:44)  POCT Blood Glucose.: 138 mg/dL (2023 17:36)  POCT Blood Glucose.: 125 mg/dL (2023 16:22)            Drains:     MS         [  ] Drainage:                 L Pleural  [  ]  Drainage:                R Pleural  [  ]  Drainage:    Pacing Wires        [  ]   Settings:                                  Isolated  [  ]    Coumadin    [ ] YES          [  ]      NO                                   PHYSICAL EXAM        Neurology: alert and oriented x 3, nonfocal, no gross deficits  CV : s1 s2 RRR  Sternal Wound :  CDI , Stable  Lungs: cta  Abdomen: soft, nontender, nondistended, positive bowel sounds, last bowel movement                       chest tubes  :    voiding / watkins - sbd         Extremities:      edema   /  -   calve tenderness ,    L leg  /  R leg  incisions cdi          acetaminophen     Tablet .. 650 milliGRAM(s) Oral every 6 hours  aMIOdarone    Tablet 400 milliGRAM(s) Oral two times a day  amLODIPine   Tablet 10 milliGRAM(s) Oral daily  ascorbic acid 500 milliGRAM(s) Oral two times a day  aspirin enteric coated 325 milliGRAM(s) Oral daily  atorvastatin 80 milliGRAM(s) Oral at bedtime  bisacodyl Suppository 10 milliGRAM(s) Rectal once  buMETAnide Injectable 1 milliGRAM(s) IV Push <User Schedule>  chlorhexidine 2% Cloths 1 Application(s) Topical daily  clopidogrel Tablet 75 milliGRAM(s) Oral daily  divalproex DR 1500 milliGRAM(s) Oral at bedtime  enoxaparin Injectable 30 milliGRAM(s) SubCutaneous every 12 hours  escitalopram 10 milliGRAM(s) Oral daily  gabapentin 100 milliGRAM(s) Oral every 8 hours  insulin glargine Injectable (LANTUS) 45 Unit(s) SubCutaneous at bedtime  insulin lispro (ADMELOG) corrective regimen sliding scale   SubCutaneous three times a day before meals  insulin lispro (ADMELOG) corrective regimen sliding scale   SubCutaneous at bedtime  insulin lispro Injectable (ADMELOG) 13 Unit(s) SubCutaneous three times a day with meals  iron sucrose IVPB 100 milliGRAM(s) IV Intermittent every 24 hours  metoprolol tartrate 50 milliGRAM(s) Oral every 12 hours  oxyCODONE    IR 5 milliGRAM(s) Oral every 4 hours PRN  oxyCODONE    IR 10 milliGRAM(s) Oral every 4 hours PRN  pantoprazole    Tablet 40 milliGRAM(s) Oral before breakfast  polyethylene glycol 3350 17 Gram(s) Oral daily  QUEtiapine 100 milliGRAM(s) Oral at bedtime  senna 2 Tablet(s) Oral at bedtime  sodium chloride 0.9%. 1000 milliLiter(s) IV Continuous <Continuous>                    Physical Therapy Rec:   Home  [  ]   Home w/ PT  [  ]  Rehab  [  ]  Discussed with Cardiothoracic Team at AM rounds.     VITAL SIGNS    Telemetry:  nsr    Vital Signs Last 24 Hrs  T(C): 37.6 (23 @ 07:00), Max: 38.3 (23 @ 20:00)  T(F): 99.7 (23 @ 07:00), Max: 100.9 (23 @ 20:00)  HR: 78 (23 @ 14:00) (72 - 91)  BP: 119/67 (23 @ 09:00) (98/60 - 150/77)  RR: 15 (23 @ 14:00) (12 - 25)  SpO2: 98% (23 @ 14:00) (93% - 99%)                    07:  -   @ 07:00  --------------------------------------------------------  IN: 780 mL / OUT: 2530 mL / NET: -1750 mL     @ 07:01  -   @ 16:17  --------------------------------------------------------  IN: 102 mL / OUT: 705 mL / NET: -603 mL          Daily     Daily Weight in k.1 (2023 00:00)            CAPILLARY BLOOD GLUCOSE  148 (2023 07:00)  134 (2023 06:00)  132 (2023 05:00)  123 (2023 04:30)  94 (2023 04:00)  102 (2023 03:00)  111 (2023 02:00)  105 (2023 01:00)  111 (2023 00:00)  120 (2023 23:00)  121 (2023 22:00)  133 (2023 21:00)  137 (2023 20:00)      POCT Blood Glucose.: 207 mg/dL (2023 13:00)  POCT Blood Glucose.: 209 mg/dL (2023 10:29)  POCT Blood Glucose.: 179 mg/dL (2023 09:00)  POCT Blood Glucose.: 175 mg/dL (2023 08:17)  POCT Blood Glucose.: 148 mg/dL (2023 06:45)  POCT Blood Glucose.: 134 mg/dL (2023 05:57)  POCT Blood Glucose.: 132 mg/dL (2023 04:56)  POCT Blood Glucose.: 123 mg/dL (2023 04:40)  POCT Blood Glucose.: 94 mg/dL (2023 03:57)  POCT Blood Glucose.: 102 mg/dL (2023 02:54)  POCT Blood Glucose.: 111 mg/dL (2023 01:43)  POCT Blood Glucose.: 105 mg/dL (2023 00:54)  POCT Blood Glucose.: 111 mg/dL (2023 23:48)  POCT Blood Glucose.: 120 mg/dL (2023 23:11)  POCT Blood Glucose.: 121 mg/dL (2023 21:46)  POCT Blood Glucose.: 133 mg/dL (2023 21:05)  POCT Blood Glucose.: 137 mg/dL (2023 19:51)  POCT Blood Glucose.: 147 mg/dL (2023 18:44)  POCT Blood Glucose.: 138 mg/dL (2023 17:36)  POCT Blood Glucose.: 125 mg/dL (2023 16:22)            Drains:     MS         [x  ] Drainage:                 L Pleural  [  ]  Drainage:                R Pleural  [  ]  Drainage:    Pacing Wires        [  ]   Settings:                                  Isolated  [  ]    Coumadin    [ ] YES          [  ]      NO                                   PHYSICAL EXAM        Neurology: alert and oriented x 3, nonfocal, no gross deficits  CV : s1 s2 RRR  Sternal Wound :  CDI , Stable  Lungs: cta  Abdomen: soft, nontender, nondistended, positive bowel sounds, last bowel movement                       chest tubes  :    voiding / watkins - sbd         Extremities:      edema   /  -   calve tenderness ,    L leg  /  R leg  incisions cdi          acetaminophen     Tablet .. 650 milliGRAM(s) Oral every 6 hours  aMIOdarone    Tablet 400 milliGRAM(s) Oral two times a day  amLODIPine   Tablet 10 milliGRAM(s) Oral daily  ascorbic acid 500 milliGRAM(s) Oral two times a day  aspirin enteric coated 325 milliGRAM(s) Oral daily  atorvastatin 80 milliGRAM(s) Oral at bedtime  bisacodyl Suppository 10 milliGRAM(s) Rectal once  buMETAnide Injectable 1 milliGRAM(s) IV Push <User Schedule>  chlorhexidine 2% Cloths 1 Application(s) Topical daily  clopidogrel Tablet 75 milliGRAM(s) Oral daily  divalproex DR 1500 milliGRAM(s) Oral at bedtime  enoxaparin Injectable 30 milliGRAM(s) SubCutaneous every 12 hours  escitalopram 10 milliGRAM(s) Oral daily  gabapentin 100 milliGRAM(s) Oral every 8 hours  insulin glargine Injectable (LANTUS) 45 Unit(s) SubCutaneous at bedtime  insulin lispro (ADMELOG) corrective regimen sliding scale   SubCutaneous three times a day before meals  insulin lispro (ADMELOG) corrective regimen sliding scale   SubCutaneous at bedtime  insulin lispro Injectable (ADMELOG) 13 Unit(s) SubCutaneous three times a day with meals  iron sucrose IVPB 100 milliGRAM(s) IV Intermittent every 24 hours  metoprolol tartrate 50 milliGRAM(s) Oral every 12 hours  oxyCODONE    IR 5 milliGRAM(s) Oral every 4 hours PRN  oxyCODONE    IR 10 milliGRAM(s) Oral every 4 hours PRN  pantoprazole    Tablet 40 milliGRAM(s) Oral before breakfast  polyethylene glycol 3350 17 Gram(s) Oral daily  QUEtiapine 100 milliGRAM(s) Oral at bedtime  senna 2 Tablet(s) Oral at bedtime  sodium chloride 0.9%. 1000 milliLiter(s) IV Continuous <Continuous>                    Physical Therapy Rec:   Home  [  ]   Home w/ PT  [  ]  Rehab  [  ]  Discussed with Cardiothoracic Team at AM rounds.

## 2023-01-19 NOTE — PROGRESS NOTE ADULT - ASSESSMENT
Patient is a 57M /w Penicillin allergy, PMH  HTN, HLD, DM2, bipolar disorder, SHIN s/p uvula resection not on CPAP,  known CAD with MI with RCA/OM stents 2013 who presented to his Cardiologist complaining of progressive SOB/LEMOS over the last 4 weeks.  Denies having typical chest pain type symptoms.  Had abnormal stress test and underwent elective cath yesterday on 1/11/23 which revealed EF 65, pLAD 90, mLAD 20, dLAD 80, OM 99, mRCA 95 via RRA access.  Now transferred to [facility] for CABG eval /w Dr. Barrientos.  Currently patient denies chest pain/sob.  No headache/dizziness.  No abdominal pain/nausea/vomiting.  No bowel/urinary symptoms.  No lower extremity pain/numbness/tingling.  No tooth pain.  Patient states he typically ambulates independently without use of assistive devices and lives with his wife in [place].      1/12/2023. Patient transferred to [facility] for CABG evaluation /w Dr. Barrientos to review cath images/TTE.    1/13 VSS overnight.  TTE done showing normal LV/RV function. Carotids without significant stenosis.  UA negative.  Started on weight based Lovenox 120mg BID   1/14 VSS TSH elevated 6.69 Dr Sequeira endocrine consulted CP free P2y12 99 repeat in am   1/15 Replete K. Continue with BBlocker, statin asa. P2Y12 pending Plan for CABG Tuesday 1/16 VSS, last dose Lovenox  1/17 s/p CABG x4 (LIMA-LAD, LSVG-OM, LSVG-PDA, LSVG-Diagonal)  iNSULIN INFUSION  eXTUBATED TO HIGH FLOW  aCUTE BLOOD LOSS ANEMIA- PRBC X 1   Patient is a 57M /w Penicillin allergy, PMH  HTN, HLD, DM2, bipolar disorder, SHIN s/p uvula resection not on CPAP,  known CAD with MI with RCA/OM stents 2013 who presented to his Cardiologist complaining of progressive SOB/LEMOS over the last 4 weeks.  Denies having typical chest pain type symptoms.  Had abnormal stress test and underwent elective cath yesterday on 1/11/23 which revealed EF 65, pLAD 90, mLAD 20, dLAD 80, OM 99, mRCA 95 via RRA access.  Now transferred to [facility] for CABG eval /w Dr. Barrientos.  Currently patient denies chest pain/sob.  No headache/dizziness.  No abdominal pain/nausea/vomiting.  No bowel/urinary symptoms.  No lower extremity pain/numbness/tingling.  No tooth pain.  Patient states he typically ambulates independently without use of assistive devices and lives with his wife in [place].      1/12/2023. Patient transferred to [facility] for CABG evaluation /w Dr. Barrientos to review cath images/TTE.    1/13 VSS overnight.  TTE done showing normal LV/RV function. Carotids without significant stenosis.  UA negative.  Started on weight based Lovenox 120mg BID   1/14 VSS TSH elevated 6.69 Dr Sequeira endocrine consulted CP free P2y12 99 repeat in am   1/15 Replete K. Continue with BBlocker, statin asa. P2Y12 pending Plan for CABG Tuesday 1/16 VSS, last dose Lovenox  1/17 s/p CABG x4 (LIMA-LAD, LSVG-OM, LSVG-PDA, LSVG-Diagonal)  iNSULIN INFUSION  eXTUBATED TO HIGH FLOW  aCUTE BLOOD LOSS ANEMIA- PRBC X 1  1/19 diuresis, weaned to NC o2  Baipolar meds resumed  Transferred to sdu Patient is a 57M /w Penicillin allergy, PMH  HTN, HLD, DM2, bipolar disorder, SHIN s/p uvula resection not on CPAP,  known CAD with MI with RCA/OM stents 2013 who presented to his Cardiologist complaining of progressive SOB/LEMOS over the last 4 weeks.  Denies having typical chest pain type symptoms.  Had abnormal stress test and underwent elective cath yesterday on 1/11/23 which revealed EF 65, pLAD 90, mLAD 20, dLAD 80, OM 99, mRCA 95 via RRA access.  Now transferred to [facility] for CABG eval /w Dr. Barrientos.  Currently patient denies chest pain/sob.  No headache/dizziness.  No abdominal pain/nausea/vomiting.  No bowel/urinary symptoms.  No lower extremity pain/numbness/tingling.  No tooth pain.  Patient states he typically ambulates independently without use of assistive devices and lives with his wife in [place].      1/12/2023. Patient transferred to [facility] for CABG evaluation /w Dr. Barrientos to review cath images/TTE.    1/13 VSS overnight.  TTE done showing normal LV/RV function. Carotids without significant stenosis.  UA negative.  Started on weight based Lovenox 120mg BID   1/14 VSS TSH elevated 6.69 Dr Sequeira endocrine consulted CP free P2y12 99 repeat in am   1/15 Replete K. Continue with BBlocker, statin asa. P2Y12 pending Plan for CABG Tuesday 1/16 VSS, last dose Lovenox  1/17 s/p CABG x4 (LIMA-LAD, LSVG-OM, LSVG-PDA, LSVG-Diagonal)  iNSULIN INFUSION  eXTUBATED TO HIGH FLOW  aCUTE BLOOD LOSS ANEMIA- PRBC X 1  1/19 diuresis, weaned to NC o2  Bipolar meds resumed  Endo for glucose control  Transferred to sdu

## 2023-01-19 NOTE — PROGRESS NOTE ADULT - SUBJECTIVE AND OBJECTIVE BOX
Cardiovascular Disease Progress Note  Date of service: 23 @ 07:13    Overnight events: No acute events overnight.  Pt is in no distress. Saturating well via NC.   Otherwise review of systems negative    Objective Findings:  T(C): 37.9 (23 @ 04:00), Max: 38.3 (23 @ 20:00)  HR: 72 (23 @ 07:00) (72 - 96)  BP: 98/60 (23 @ 06:00) (98/60 - 150/77)  RR: 13 (23 @ 07:00) (9 - 25)  SpO2: 98% (23 @ 07:00) (93% - 99%)  Wt(kg): --  Daily     Daily Weight in k.1 (2023 00:00)      Physical Exam:  Gen: NAD; Patient resting comfortably  HEENT: EOMI, Normocephalic/ atraumatic  CV: RRR, normal S1 + S2, no m/r/g, Sterntomy dressing inplace.   Lungs:  Normal respiratory effort; CT in place.   Abd: soft, non-tender; bowel sounds present  Ext: No edema; warm and well perfused    Telemetry: Sinus    Laboratory Data:                        9.6    12.22 )-----------( 225      ( 2023 00:27 )             29.7         138  |  103  |  11  ----------------------------<  111<H>  4.0   |  23  |  0.56    Ca    8.6      2023 00:26  Phos  2.7       Mg     2.1         TPro  6.2  /  Alb  3.6  /  TBili  0.3  /  DBili  x   /  AST  44<H>  /  ALT  36  /  AlkPhos  58      PT/INR - ( 2023 18:02 )   PT: 15.0 sec;   INR: 1.29 ratio         PTT - ( 2023 18:02 )  PTT:28.7 sec  CARDIAC MARKERS ( 2023 18:03 )  x     / x     / 775 U/L / x     / 29.1 ng/mL          Inpatient Medications:  MEDICATIONS  (STANDING):  acetaminophen     Tablet .. 650 milliGRAM(s) Oral every 6 hours  aMIOdarone    Tablet 400 milliGRAM(s) Oral two times a day  amLODIPine   Tablet 10 milliGRAM(s) Oral daily  ascorbic acid 500 milliGRAM(s) Oral two times a day  aspirin enteric coated 325 milliGRAM(s) Oral daily  atorvastatin 80 milliGRAM(s) Oral at bedtime  bisacodyl Suppository 10 milliGRAM(s) Rectal once  chlorhexidine 2% Cloths 1 Application(s) Topical daily  divalproex  milliGRAM(s) Oral at bedtime  enoxaparin Injectable 30 milliGRAM(s) SubCutaneous every 12 hours  gabapentin 100 milliGRAM(s) Oral every 8 hours  insulin regular Infusion 3 Unit(s)/Hr (3 mL/Hr) IV Continuous <Continuous>  iron sucrose IVPB 100 milliGRAM(s) IV Intermittent every 24 hours  metoprolol tartrate 25 milliGRAM(s) Oral every 8 hours  pantoprazole    Tablet 40 milliGRAM(s) Oral before breakfast  polyethylene glycol 3350 17 Gram(s) Oral daily  senna 2 Tablet(s) Oral at bedtime  sodium chloride 0.9%. 1000 milliLiter(s) (10 mL/Hr) IV Continuous <Continuous>       Assessment:  57M /w Penicillin allergy, PMH  HTN, HLD, DM2, bipolar disorder, SHIN s/p uvula resection not on CPAP,  known CAD with MI with RCA/OM stents  who presented to his Cardiologist complaining of progressive SOB/LEMOS over the last 4 weeks.    Plan of Care:    #Multivessel CAD  - Noted on [facility] 2023  - s/p CABG x4 (LIMA-LAD, LSVG- PDA, LSVG-OM, LSVG-Diag)  - Tolerated procedure  - Pt remains in CTICU for hemodynamic monitoring  - TTE  shows normal LV systolic function  - Continue ASA, statin and BB  - Amio for afib ppx      #Hypoxic respiratory failure  - s/p CABG  - Pt s/p extubation on NC, wean as tolerated  - CT in place  - Management as per CTS team      #HLD  - Statin as ordered    #DM  - ISS   - Defer management to primary team              Plan of Care:          Over 25 minutes spent on total encounter; more than 50% of the visit was spent counseling and/or coordinating care by the attending physician.      Kevin Landrum,  [facility]  Cardiovascular Disease  [phone]

## 2023-01-19 NOTE — PROGRESS NOTE ADULT - ASSESSMENT
57M /w Penicillin allergy, PMH  HTN, HLD, DM2, bipolar disorder, SHIN s/p uvula resection not on CPAP,  known CAD with MI with RCA/OM stents 2013 who presented to his Cardiologist complaining of progressive SOB/LEMOS over the last 4 weeks.  Denies having typical chest pain type symptoms.  Had abnormal stress test and underwent elective cath yesterday on 1/11/23 which revealed EF 65, pLAD 90, mLAD 20, dLAD 80, OM 99, mRCA 95 via RRA access.  Currently patient denies chest pain/sob.  No headache/dizziness.  No abdominal pain/nausea/vomiting.  No bowel/urinary symptoms.  No lower extremity pain/numbness/tingling.  No tooth pain.  Patient states he typically ambulates independently without use of assistive devices and lives with his wife in [place].       Problem/Recommendation - 1:  ·  Problem: CAD, multiple vessel.   ·  Recommendation: S/P CABG x4 vessel   CTS help appreciated .   S/P Extubation . On NC  oxygen.      Problem/Recommendation - 2:  ·  Problem: Diabetes.   ·  Recommendation: Uncontrolled as HgbA1C high so endo helping.   On Insulin.   Sugars under good control.     Problem/Recommendation - 3:  ·  Problem: Hypertension.   ·  Recommendation: BP readings fine.   BP meds  per CTICU .      Problem/Recommendation - 4:  ·  Problem: Hyperlipidemia.   ·  Recommendation: Statin.     Problem/Recommendation - 5:  ·  Problem: Bipolar disorder.   ·  Recommendation: Home meds.

## 2023-01-19 NOTE — DIETITIAN INITIAL EVALUATION ADULT - REASON FOR ADMISSION
Patient is a 57M /w PMH HTN, HLD, DM2, bipolar disorder, SHIN s/p uvula resection not on CPAP,  known CAD with MI presenting with SOB. Now s/p CABG x 4 1/17/23.

## 2023-01-20 LAB
ALBUMIN SERPL ELPH-MCNC: 2.9 G/DL — LOW (ref 3.3–5)
ALP SERPL-CCNC: 63 U/L — SIGNIFICANT CHANGE UP (ref 40–120)
ALT FLD-CCNC: 26 U/L — SIGNIFICANT CHANGE UP (ref 10–45)
ANION GAP SERPL CALC-SCNC: 9 MMOL/L — SIGNIFICANT CHANGE UP (ref 5–17)
AST SERPL-CCNC: 21 U/L — SIGNIFICANT CHANGE UP (ref 10–40)
BASOPHILS # BLD AUTO: 0.03 K/UL — SIGNIFICANT CHANGE UP (ref 0–0.2)
BASOPHILS NFR BLD AUTO: 0.3 % — SIGNIFICANT CHANGE UP (ref 0–2)
BILIRUB SERPL-MCNC: 0.3 MG/DL — SIGNIFICANT CHANGE UP (ref 0.2–1.2)
BUN SERPL-MCNC: 17 MG/DL — SIGNIFICANT CHANGE UP (ref 7–23)
CALCIUM SERPL-MCNC: 8.4 MG/DL — SIGNIFICANT CHANGE UP (ref 8.4–10.5)
CHLORIDE SERPL-SCNC: 98 MMOL/L — SIGNIFICANT CHANGE UP (ref 96–108)
CO2 SERPL-SCNC: 27 MMOL/L — SIGNIFICANT CHANGE UP (ref 22–31)
CREAT SERPL-MCNC: 0.66 MG/DL — SIGNIFICANT CHANGE UP (ref 0.5–1.3)
EGFR: 109 ML/MIN/1.73M2 — SIGNIFICANT CHANGE UP
EOSINOPHIL # BLD AUTO: 0.18 K/UL — SIGNIFICANT CHANGE UP (ref 0–0.5)
EOSINOPHIL NFR BLD AUTO: 1.6 % — SIGNIFICANT CHANGE UP (ref 0–6)
GLUCOSE BLDC GLUCOMTR-MCNC: 117 MG/DL — HIGH (ref 70–99)
GLUCOSE BLDC GLUCOMTR-MCNC: 120 MG/DL — HIGH (ref 70–99)
GLUCOSE BLDC GLUCOMTR-MCNC: 161 MG/DL — HIGH (ref 70–99)
GLUCOSE BLDC GLUCOMTR-MCNC: 248 MG/DL — HIGH (ref 70–99)
GLUCOSE SERPL-MCNC: 149 MG/DL — HIGH (ref 70–99)
HCT VFR BLD CALC: 26.5 % — LOW (ref 39–50)
HGB BLD-MCNC: 8.5 G/DL — LOW (ref 13–17)
IMM GRANULOCYTES NFR BLD AUTO: 1.4 % — HIGH (ref 0–0.9)
LYMPHOCYTES # BLD AUTO: 2.61 K/UL — SIGNIFICANT CHANGE UP (ref 1–3.3)
LYMPHOCYTES # BLD AUTO: 22.6 % — SIGNIFICANT CHANGE UP (ref 13–44)
MAGNESIUM SERPL-MCNC: 2.1 MG/DL — SIGNIFICANT CHANGE UP (ref 1.6–2.6)
MCHC RBC-ENTMCNC: 28.5 PG — SIGNIFICANT CHANGE UP (ref 27–34)
MCHC RBC-ENTMCNC: 32.1 GM/DL — SIGNIFICANT CHANGE UP (ref 32–36)
MCV RBC AUTO: 88.9 FL — SIGNIFICANT CHANGE UP (ref 80–100)
MONOCYTES # BLD AUTO: 1.49 K/UL — HIGH (ref 0–0.9)
MONOCYTES NFR BLD AUTO: 12.9 % — SIGNIFICANT CHANGE UP (ref 2–14)
NEUTROPHILS # BLD AUTO: 7.08 K/UL — SIGNIFICANT CHANGE UP (ref 1.8–7.4)
NEUTROPHILS NFR BLD AUTO: 61.2 % — SIGNIFICANT CHANGE UP (ref 43–77)
NRBC # BLD: 0 /100 WBCS — SIGNIFICANT CHANGE UP (ref 0–0)
PHOSPHATE SERPL-MCNC: 1.9 MG/DL — LOW (ref 2.5–4.5)
PLATELET # BLD AUTO: 186 K/UL — SIGNIFICANT CHANGE UP (ref 150–400)
POTASSIUM SERPL-MCNC: 3.7 MMOL/L — SIGNIFICANT CHANGE UP (ref 3.5–5.3)
POTASSIUM SERPL-SCNC: 3.7 MMOL/L — SIGNIFICANT CHANGE UP (ref 3.5–5.3)
PROT SERPL-MCNC: 5.9 G/DL — LOW (ref 6–8.3)
RBC # BLD: 2.98 M/UL — LOW (ref 4.2–5.8)
RBC # FLD: 15.4 % — HIGH (ref 10.3–14.5)
SODIUM SERPL-SCNC: 134 MMOL/L — LOW (ref 135–145)
WBC # BLD: 11.55 K/UL — HIGH (ref 3.8–10.5)
WBC # FLD AUTO: 11.55 K/UL — HIGH (ref 3.8–10.5)

## 2023-01-20 PROCEDURE — 71045 X-RAY EXAM CHEST 1 VIEW: CPT | Mod: 26

## 2023-01-20 PROCEDURE — 71045 X-RAY EXAM CHEST 1 VIEW: CPT | Mod: 26,77

## 2023-01-20 RX ORDER — INSULIN GLARGINE 100 [IU]/ML
54 INJECTION, SOLUTION SUBCUTANEOUS AT BEDTIME
Refills: 0 | Status: DISCONTINUED | OUTPATIENT
Start: 2023-01-20 | End: 2023-01-21

## 2023-01-20 RX ORDER — INSULIN LISPRO 100/ML
21 VIAL (ML) SUBCUTANEOUS
Refills: 0 | Status: DISCONTINUED | OUTPATIENT
Start: 2023-01-20 | End: 2023-01-20

## 2023-01-20 RX ORDER — SODIUM,POTASSIUM PHOSPHATES 278-250MG
1 POWDER IN PACKET (EA) ORAL
Refills: 0 | Status: COMPLETED | OUTPATIENT
Start: 2023-01-20 | End: 2023-01-21

## 2023-01-20 RX ORDER — SPIRONOLACTONE 25 MG/1
25 TABLET, FILM COATED ORAL DAILY
Refills: 0 | Status: DISCONTINUED | OUTPATIENT
Start: 2023-01-20 | End: 2023-01-23

## 2023-01-20 RX ORDER — INSULIN LISPRO 100/ML
26 VIAL (ML) SUBCUTANEOUS
Refills: 0 | Status: DISCONTINUED | OUTPATIENT
Start: 2023-01-20 | End: 2023-01-21

## 2023-01-20 RX ORDER — FUROSEMIDE 40 MG
40 TABLET ORAL DAILY
Refills: 0 | Status: DISCONTINUED | OUTPATIENT
Start: 2023-01-21 | End: 2023-01-23

## 2023-01-20 RX ORDER — POTASSIUM BICARBONATE 978 MG/1
25 TABLET, EFFERVESCENT ORAL
Refills: 0 | Status: COMPLETED | OUTPATIENT
Start: 2023-01-20 | End: 2023-01-20

## 2023-01-20 RX ADMIN — AMLODIPINE BESYLATE 10 MILLIGRAM(S): 2.5 TABLET ORAL at 05:05

## 2023-01-20 RX ADMIN — Medication 650 MILLIGRAM(S): at 13:12

## 2023-01-20 RX ADMIN — Medication 325 MILLIGRAM(S): at 12:44

## 2023-01-20 RX ADMIN — Medication 650 MILLIGRAM(S): at 05:20

## 2023-01-20 RX ADMIN — Medication 1 TABLET(S): at 21:19

## 2023-01-20 RX ADMIN — Medication 1 TABLET(S): at 16:59

## 2023-01-20 RX ADMIN — OXYCODONE HYDROCHLORIDE 5 MILLIGRAM(S): 5 TABLET ORAL at 12:42

## 2023-01-20 RX ADMIN — GABAPENTIN 100 MILLIGRAM(S): 400 CAPSULE ORAL at 21:19

## 2023-01-20 RX ADMIN — Medication 50 MILLIGRAM(S): at 17:00

## 2023-01-20 RX ADMIN — Medication 1: at 08:03

## 2023-01-20 RX ADMIN — OXYCODONE HYDROCHLORIDE 5 MILLIGRAM(S): 5 TABLET ORAL at 16:40

## 2023-01-20 RX ADMIN — POTASSIUM BICARBONATE 25 MILLIEQUIVALENT(S): 978 TABLET, EFFERVESCENT ORAL at 07:55

## 2023-01-20 RX ADMIN — ENOXAPARIN SODIUM 30 MILLIGRAM(S): 100 INJECTION SUBCUTANEOUS at 05:05

## 2023-01-20 RX ADMIN — AMIODARONE HYDROCHLORIDE 400 MILLIGRAM(S): 400 TABLET ORAL at 05:05

## 2023-01-20 RX ADMIN — PANTOPRAZOLE SODIUM 40 MILLIGRAM(S): 20 TABLET, DELAYED RELEASE ORAL at 05:20

## 2023-01-20 RX ADMIN — SENNA PLUS 2 TABLET(S): 8.6 TABLET ORAL at 21:20

## 2023-01-20 RX ADMIN — POTASSIUM BICARBONATE 25 MILLIEQUIVALENT(S): 978 TABLET, EFFERVESCENT ORAL at 12:43

## 2023-01-20 RX ADMIN — Medication 650 MILLIGRAM(S): at 12:44

## 2023-01-20 RX ADMIN — Medication 500 MILLIGRAM(S): at 05:20

## 2023-01-20 RX ADMIN — Medication 21 UNIT(S): at 08:11

## 2023-01-20 RX ADMIN — ATORVASTATIN CALCIUM 80 MILLIGRAM(S): 80 TABLET, FILM COATED ORAL at 21:18

## 2023-01-20 RX ADMIN — BUMETANIDE 1 MILLIGRAM(S): 0.25 INJECTION INTRAMUSCULAR; INTRAVENOUS at 07:55

## 2023-01-20 RX ADMIN — GABAPENTIN 100 MILLIGRAM(S): 400 CAPSULE ORAL at 12:45

## 2023-01-20 RX ADMIN — IRON SUCROSE 210 MILLIGRAM(S): 20 INJECTION, SOLUTION INTRAVENOUS at 20:33

## 2023-01-20 RX ADMIN — OXYCODONE HYDROCHLORIDE 5 MILLIGRAM(S): 5 TABLET ORAL at 08:02

## 2023-01-20 RX ADMIN — OXYCODONE HYDROCHLORIDE 5 MILLIGRAM(S): 5 TABLET ORAL at 08:32

## 2023-01-20 RX ADMIN — Medication 1 TABLET(S): at 12:43

## 2023-01-20 RX ADMIN — CLOPIDOGREL BISULFATE 75 MILLIGRAM(S): 75 TABLET, FILM COATED ORAL at 12:44

## 2023-01-20 RX ADMIN — OXYCODONE HYDROCHLORIDE 5 MILLIGRAM(S): 5 TABLET ORAL at 13:12

## 2023-01-20 RX ADMIN — GABAPENTIN 100 MILLIGRAM(S): 400 CAPSULE ORAL at 05:05

## 2023-01-20 RX ADMIN — Medication 1 TABLET(S): at 08:02

## 2023-01-20 RX ADMIN — INSULIN GLARGINE 54 UNIT(S): 100 INJECTION, SOLUTION SUBCUTANEOUS at 21:23

## 2023-01-20 RX ADMIN — OXYCODONE HYDROCHLORIDE 5 MILLIGRAM(S): 5 TABLET ORAL at 06:56

## 2023-01-20 RX ADMIN — Medication 650 MILLIGRAM(S): at 05:05

## 2023-01-20 RX ADMIN — DIVALPROEX SODIUM 1500 MILLIGRAM(S): 500 TABLET, DELAYED RELEASE ORAL at 21:19

## 2023-01-20 RX ADMIN — Medication 21 UNIT(S): at 12:30

## 2023-01-20 RX ADMIN — Medication 500 MILLIGRAM(S): at 16:59

## 2023-01-20 RX ADMIN — CHLORHEXIDINE GLUCONATE 1 APPLICATION(S): 213 SOLUTION TOPICAL at 12:45

## 2023-01-20 RX ADMIN — ESCITALOPRAM OXALATE 10 MILLIGRAM(S): 10 TABLET, FILM COATED ORAL at 12:44

## 2023-01-20 RX ADMIN — QUETIAPINE FUMARATE 100 MILLIGRAM(S): 200 TABLET, FILM COATED ORAL at 21:22

## 2023-01-20 RX ADMIN — OXYCODONE HYDROCHLORIDE 5 MILLIGRAM(S): 5 TABLET ORAL at 16:10

## 2023-01-20 RX ADMIN — Medication 26 UNIT(S): at 16:58

## 2023-01-20 RX ADMIN — SPIRONOLACTONE 25 MILLIGRAM(S): 25 TABLET, FILM COATED ORAL at 08:02

## 2023-01-20 RX ADMIN — OXYCODONE HYDROCHLORIDE 5 MILLIGRAM(S): 5 TABLET ORAL at 06:26

## 2023-01-20 RX ADMIN — Medication 2: at 12:30

## 2023-01-20 RX ADMIN — Medication 50 MILLIGRAM(S): at 05:06

## 2023-01-20 RX ADMIN — ENOXAPARIN SODIUM 30 MILLIGRAM(S): 100 INJECTION SUBCUTANEOUS at 16:59

## 2023-01-20 RX ADMIN — POTASSIUM BICARBONATE 25 MILLIEQUIVALENT(S): 978 TABLET, EFFERVESCENT ORAL at 09:25

## 2023-01-20 RX ADMIN — POLYETHYLENE GLYCOL 3350 17 GRAM(S): 17 POWDER, FOR SOLUTION ORAL at 12:44

## 2023-01-20 NOTE — PROGRESS NOTE ADULT - ASSESSMENT
57M /w Penicillin allergy, PMH  HTN, HLD, DM2, bipolar disorder, SHIN s/p uvula resection not on CPAP,  known CAD with MI with RCA/OM stents 2013 who presented to his Cardiologist complaining of progressive SOB/LEMOS over the last 4 weeks.  Denies having typical chest pain type symptoms.  Had abnormal stress test and underwent elective cath yesterday on 1/11/23 which revealed EF 65, pLAD 90, mLAD 20, dLAD 80, OM 99, mRCA 95 via RRA access.  Currently patient denies chest pain/sob.  No headache/dizziness.  No abdominal pain/nausea/vomiting.  No bowel/urinary symptoms.  No lower extremity pain/numbness/tingling.  No tooth pain.  Patient states he typically ambulates independently without use of assistive devices and lives with his wife in [place].       Problem/Recommendation - 1:  ·  Problem: CAD, multiple vessel.   ·  Recommendation: S/P CABG x4 vessel   CTS help appreciated .   S/P Extubation . On NC  oxygen.      Problem/Recommendation - 2:  ·  Problem: Diabetes.   ·  Recommendation: Uncontrolled as HgbA1C high so endo helping.   On Insulin.   Sugars under good control.     Problem/Recommendation - 3:  ·  Problem: Hypertension.   ·  Recommendation: BP readings fine.   BP meds  per CTICU .      Problem/Recommendation - 4:  ·  Problem: Hyperlipidemia.   ·  Recommendation: Statin.     Problem/Recommendation - 5:  ·  Problem: Bipolar disorder.   ·  Recommendation: Home meds.

## 2023-01-20 NOTE — PROGRESS NOTE ADULT - SUBJECTIVE AND OBJECTIVE BOX
Cardiovascular Disease Progress Note  Date of service: 23 @ 08:12    Overnight events: No acute events overnight.  Pt is in no distress.   Otherwise review of systems negative    Objective Findings:  T(C): 36.7 (23 @ 07:00), Max: 36.9 (23 @ 23:20)  HR: 70 (23 @ 07:00) (70 - 90)  BP: 101/58 (23 @ 07:00) (101/58 - 129/59)  RR: 18 (23 @ 07:00) (14 - 22)  SpO2: 97% (23 @ 07:00) (94% - 98%)  Wt(kg): --  Daily     Daily Weight in k (2023 05:50)      Physical Exam:  Gen: NAD; Patient resting comfortably  HEENT: EOMI, Normocephalic/ atraumatic  CV: RRR, normal S1 + S2, no m/r/g  Lungs:  Normal respiratory effort; clear to auscultation bilaterally  Abd: soft, non-tender; bowel sounds present  Ext: No edema; warm and well perfused    Telemetry: Sinus     Laboratory Data:                        8.5    11.55 )-----------( 186      ( 2023 06:07 )             26.5         134<L>  |  98  |  17  ----------------------------<  149<H>  3.7   |  27  |  0.66    Ca    8.4      2023 06:07  Phos  1.9       Mg     2.1         TPro  5.9<L>  /  Alb  2.9<L>  /  TBili  0.3  /  DBili  x   /  AST  21  /  ALT  26  /  AlkPhos  63                Inpatient Medications:  MEDICATIONS  (STANDING):  acetaminophen     Tablet .. 650 milliGRAM(s) Oral every 6 hours  amLODIPine   Tablet 10 milliGRAM(s) Oral daily  ascorbic acid 500 milliGRAM(s) Oral two times a day  aspirin enteric coated 325 milliGRAM(s) Oral daily  atorvastatin 80 milliGRAM(s) Oral at bedtime  bisacodyl Suppository 10 milliGRAM(s) Rectal once  buMETAnide Injectable 1 milliGRAM(s) IV Push <User Schedule>  chlorhexidine 2% Cloths 1 Application(s) Topical daily  clopidogrel Tablet 75 milliGRAM(s) Oral daily  divalproex DR 1500 milliGRAM(s) Oral at bedtime  enoxaparin Injectable 30 milliGRAM(s) SubCutaneous every 12 hours  escitalopram 10 milliGRAM(s) Oral daily  gabapentin 100 milliGRAM(s) Oral every 8 hours  insulin glargine Injectable (LANTUS) 54 Unit(s) SubCutaneous at bedtime  insulin lispro (ADMELOG) corrective regimen sliding scale   SubCutaneous three times a day before meals  insulin lispro (ADMELOG) corrective regimen sliding scale   SubCutaneous at bedtime  insulin lispro Injectable (ADMELOG) 21 Unit(s) SubCutaneous three times a day with meals  iron sucrose IVPB 100 milliGRAM(s) IV Intermittent every 24 hours  melatonin 5 milliGRAM(s) Oral at bedtime  metoprolol tartrate 50 milliGRAM(s) Oral every 12 hours  pantoprazole    Tablet 40 milliGRAM(s) Oral before breakfast  polyethylene glycol 3350 17 Gram(s) Oral daily  potassium bicarbonate/potassium citrate 25 milliEquivalent(s) Oral every 1 hour  potassium phosphate / sodium phosphate Tablet (K-PHOS No. 2) 1 Tablet(s) Oral four times a day with meals  QUEtiapine 100 milliGRAM(s) Oral at bedtime  senna 2 Tablet(s) Oral at bedtime  sodium chloride 0.9%. 1000 milliLiter(s) (10 mL/Hr) IV Continuous <Continuous>  spironolactone 25 milliGRAM(s) Oral daily      Assessment:  57M /w Penicillin allergy, PMH  HTN, HLD, DM2, bipolar disorder, HSIN s/p uvula resection not on CPAP,  known CAD with MI with RCA/OM stents  who presented to his Cardiologist complaining of progressive SOB/LEMOS over the last 4 weeks.    Plan of Care:    #Multivessel CAD  - Noted on [facility] 2023  - s/p CABG x4 (LIMA-LAD, LSVG- PDA, LSVG-OM, LSVG-Diag)  - TTE  shows normal LV systolic function  - Continue ASA, Plavix statin and BB    #HTN  - BP Acceptable on current regimen      #HLD  - Statin as ordered    #DM  - ISS   - Defer management to primary team        Plan of Care:          Over 25 minutes spent on total encounter; more than 50% of the visit was spent counseling and/or coordinating care by the attending physician.      Kevin Landrum DO Located within [facility]  Cardiovascular Disease  [phone]

## 2023-01-20 NOTE — PROGRESS NOTE ADULT - ASSESSMENT
Patient is a 57M /w Penicillin allergy, PMH  HTN, HLD, DM2, bipolar disorder, SHIN s/p uvula resection not on CPAP,  known CAD with MI with RCA/OM stents 2013 who presented to his Cardiologist complaining of progressive SOB/LEMOS over the last 4 weeks.  Denies having typical chest pain type symptoms.  Had abnormal stress test and underwent elective cath yesterday on 1/11/23 which revealed EF 65, pLAD 90, mLAD 20, dLAD 80, OM 99, mRCA 95 via RRA access.  Now transferred to [facility] for CABG eval /w Dr. Barrientos.  Currently patient denies chest pain/sob.  No headache/dizziness.  No abdominal pain/nausea/vomiting.  No bowel/urinary symptoms.  No lower extremity pain/numbness/tingling.  No tooth pain.  Patient states he typically ambulates independently without use of assistive devices and lives with his wife in [place].      1/12/2023. Patient transferred to [facility] for CABG evaluation /w Dr. Barrientos to review cath images/TTE.    1/13 VSS overnight.  TTE done showing normal LV/RV function. Carotids without significant stenosis.  UA negative.  Started on weight based Lovenox 120mg BID   1/14 VSS TSH elevated 6.69 Dr Sequeira endocrine consulted CP free P2y12 99 repeat in am   1/15 Replete K. Continue with BBlocker, statin asa. P2Y12 pending Plan for CABG Tuesday 1/16 VSS, last dose Lovenox  1/17 s/p CABG x4 (LIMA-LAD, LSVG-OM, LSVG-PDA, LSVG-Diagonal)  iNSULIN INFUSION  eXTUBATED TO HIGH FLOW  aCUTE BLOOD LOSS ANEMIA- PRBC X 1  1/19 diuresis, weaned to NC o2  Bipolar meds resumed  Endo for glucose control  Transferred to sdu  1/20 MS #2 d/c last rosa, ms x 1 this am  D/c pleural as drainage decreases  ASmbulte  Glucose control  When glucose is stable may transfer to floor

## 2023-01-20 NOTE — PROGRESS NOTE ADULT - SUBJECTIVE AND OBJECTIVE BOX
VITAL SIGNS    Telemetry:  nsr 70    Vital Signs Last 24 Hrs  T(C): 36.8 (23 @ 11:00), Max: 36.9 (23 @ 23:20)  T(F): 98.3 (23 @ 11:00), Max: 98.4 (23 @ 23:20)  HR: 80 (23 @ 11:00) (70 - 80)  BP: 118/60 (23 @ 11:00) (101/58 - 129/59)  RR: 18 (23 @ 11:00) (18 - 18)  SpO2: 94% (23 @ 11:00) (94% - 98%)                    07:  -   @ 07:00  --------------------------------------------------------  IN: 862 mL / OUT: 1240 mL / NET: -378 mL     @ 07:01  -   @ 14:06  --------------------------------------------------------  IN: 240 mL / OUT: 750 mL / NET: -510 mL          Daily     Daily Weight in k (2023 05:50)            CAPILLARY BLOOD GLUCOSE      POCT Blood Glucose.: 248 mg/dL (2023 11:30)  POCT Blood Glucose.: 161 mg/dL (2023 07:33)  POCT Blood Glucose.: 207 mg/dL (2023 21:54)  POCT Blood Glucose.: 217 mg/dL (2023 16:39)            Drains:     MS         [  x] Drainage:                 L Pleural  [  ]x  Drainage:                R Pleural  [  ]  Drainage:    Pacing Wires        [  ]   Settings:                                  Isolated  [  ]    Coumadin    [ ] YES          [ x ]      NO                                   PHYSICAL EXAM      Neurology: alert and oriented x 3, nonfocal, no gross deficits  CV : s1 s2 RRR  R ij cdi  Sternal Wound :  CDI , Stable provena  Lungs: cta  Abdomen: soft, nontender, nondistended, positive bowel sounds, last bowel movement                       chest tubes x 2  :    watkins - sbd         Extremities:     + edema   /  -   calve tenderness ,    L leg  incisions cdi    i          acetaminophen     Tablet .. 650 milliGRAM(s) Oral every 6 hours PRN  amLODIPine   Tablet 10 milliGRAM(s) Oral daily  ascorbic acid 500 milliGRAM(s) Oral two times a day  aspirin enteric coated 325 milliGRAM(s) Oral daily  atorvastatin 80 milliGRAM(s) Oral at bedtime  bisacodyl Suppository 10 milliGRAM(s) Rectal once  chlorhexidine 2% Cloths 1 Application(s) Topical daily  clopidogrel Tablet 75 milliGRAM(s) Oral daily  divalproex DR 1500 milliGRAM(s) Oral at bedtime  enoxaparin Injectable 30 milliGRAM(s) SubCutaneous every 12 hours  escitalopram 10 milliGRAM(s) Oral daily  gabapentin 100 milliGRAM(s) Oral every 8 hours  insulin glargine Injectable (LANTUS) 54 Unit(s) SubCutaneous at bedtime  insulin lispro (ADMELOG) corrective regimen sliding scale   SubCutaneous three times a day before meals  insulin lispro (ADMELOG) corrective regimen sliding scale   SubCutaneous at bedtime  insulin lispro Injectable (ADMELOG) 26 Unit(s) SubCutaneous three times a day with meals  iron sucrose IVPB 100 milliGRAM(s) IV Intermittent every 24 hours  melatonin 5 milliGRAM(s) Oral at bedtime  metoprolol tartrate 50 milliGRAM(s) Oral every 12 hours  oxyCODONE    IR 5 milliGRAM(s) Oral every 4 hours PRN  pantoprazole    Tablet 40 milliGRAM(s) Oral before breakfast  polyethylene glycol 3350 17 Gram(s) Oral daily  potassium phosphate / sodium phosphate Tablet (K-PHOS No. 2) 1 Tablet(s) Oral four times a day with meals  QUEtiapine 100 milliGRAM(s) Oral at bedtime  senna 2 Tablet(s) Oral at bedtime  sodium chloride 0.9%. 1000 milliLiter(s) IV Continuous <Continuous>  spironolactone 25 milliGRAM(s) Oral daily                    Physical Therapy Rec:   Home  [  ]   Home w/ PT  [  ]  Rehab  [  ]  Discussed with Cardiothoracic Team at AM rounds.

## 2023-01-20 NOTE — PROGRESS NOTE ADULT - SUBJECTIVE AND OBJECTIVE BOX
Date of Service  : 01-20-23     INTERVAL HPI/OVERNIGHT EVENTS: Getting his CT removed.   Vital Signs Last 24 Hrs  T(C): 36.7 (20 Jan 2023 14:29), Max: 36.9 (19 Jan 2023 23:20)  T(F): 98.1 (20 Jan 2023 14:29), Max: 98.4 (19 Jan 2023 23:20)  HR: 81 (20 Jan 2023 15:23) (70 - 81)  BP: 131/68 (20 Jan 2023 15:23) (101/58 - 131/68)  BP(mean): 84 (20 Jan 2023 14:29) (75 - 85)  RR: 18 (20 Jan 2023 14:29) (18 - 18)  SpO2: 97% (20 Jan 2023 15:23) (94% - 98%)    Parameters below as of 20 Jan 2023 15:23  Patient On (Oxygen Delivery Method): nasal cannula  O2 Flow (L/min): 4    I&O's Summary    19 Jan 2023 07:01  -  20 Jan 2023 07:00  --------------------------------------------------------  IN: 862 mL / OUT: 1240 mL / NET: -378 mL    20 Jan 2023 07:01  -  20 Jan 2023 16:33  --------------------------------------------------------  IN: 240 mL / OUT: 750 mL / NET: -510 mL      MEDICATIONS  (STANDING):  ascorbic acid 500 milliGRAM(s) Oral two times a day  aspirin enteric coated 325 milliGRAM(s) Oral daily  atorvastatin 80 milliGRAM(s) Oral at bedtime  bisacodyl Suppository 10 milliGRAM(s) Rectal once  chlorhexidine 2% Cloths 1 Application(s) Topical daily  clopidogrel Tablet 75 milliGRAM(s) Oral daily  divalproex DR 1500 milliGRAM(s) Oral at bedtime  enoxaparin Injectable 30 milliGRAM(s) SubCutaneous every 12 hours  escitalopram 10 milliGRAM(s) Oral daily  gabapentin 100 milliGRAM(s) Oral every 8 hours  insulin glargine Injectable (LANTUS) 54 Unit(s) SubCutaneous at bedtime  insulin lispro (ADMELOG) corrective regimen sliding scale   SubCutaneous three times a day before meals  insulin lispro (ADMELOG) corrective regimen sliding scale   SubCutaneous at bedtime  insulin lispro Injectable (ADMELOG) 26 Unit(s) SubCutaneous three times a day with meals  iron sucrose IVPB 100 milliGRAM(s) IV Intermittent every 24 hours  melatonin 5 milliGRAM(s) Oral at bedtime  metoprolol tartrate 50 milliGRAM(s) Oral every 12 hours  pantoprazole    Tablet 40 milliGRAM(s) Oral before breakfast  polyethylene glycol 3350 17 Gram(s) Oral daily  potassium phosphate / sodium phosphate Tablet (K-PHOS No. 2) 1 Tablet(s) Oral four times a day with meals  QUEtiapine 100 milliGRAM(s) Oral at bedtime  senna 2 Tablet(s) Oral at bedtime  sodium chloride 0.9%. 1000 milliLiter(s) (10 mL/Hr) IV Continuous <Continuous>  spironolactone 25 milliGRAM(s) Oral daily    MEDICATIONS  (PRN):  acetaminophen     Tablet .. 650 milliGRAM(s) Oral every 6 hours PRN Mild Pain (1 - 3)  oxyCODONE    IR 5 milliGRAM(s) Oral every 4 hours PRN Moderate Pain (4 - 6)    LABS:                        8.5    11.55 )-----------( 186      ( 20 Jan 2023 06:07 )             26.5     01-20    134<L>  |  98  |  17  ----------------------------<  149<H>  3.7   |  27  |  0.66    Ca    8.4      20 Jan 2023 06:07  Phos  1.9     01-20  Mg     2.1     01-20    TPro  5.9<L>  /  Alb  2.9<L>  /  TBili  0.3  /  DBili  x   /  AST  21  /  ALT  26  /  AlkPhos  63  01-20        CAPILLARY BLOOD GLUCOSE      POCT Blood Glucose.: 120 mg/dL (20 Jan 2023 16:12)  POCT Blood Glucose.: 248 mg/dL (20 Jan 2023 11:30)  POCT Blood Glucose.: 161 mg/dL (20 Jan 2023 07:33)  POCT Blood Glucose.: 207 mg/dL (19 Jan 2023 21:54)  POCT Blood Glucose.: 217 mg/dL (19 Jan 2023 16:39)      ABG - ( 19 Jan 2023 13:56 )  pH, Arterial: 7.43  pH, Blood: x     /  pCO2: 43    /  pO2: 100   / HCO3: 28    / Base Excess: 3.8   /  SaO2: 97.4                REVIEW OF SYSTEMS:  CONSTITUTIONAL: No fever, weight loss, or fatigue  EYES: No eye pain, visual disturbances, or discharge  ENMT:  No difficulty hearing, tinnitus, vertigo; No sinus or throat pain  NECK: No pain or stiffness  RESPIRATORY: No cough, wheezing, chills or hemoptysis; No shortness of breath  CARDIOVASCULAR: No chest pain, palpitations, dizziness, or leg swelling  GASTROINTESTINAL: No abdominal or epigastric pain. No nausea, vomiting, or hematemesis; No diarrhea or constipation. No melena or hematochezia.  GENITOURINARY: No dysuria, frequency, hematuria, or incontinence      Consultant(s) Notes Reviewed:  [x ] YES  [ ] NO    PHYSICAL EXAM:  GENERAL: NAD, not in any distress ,  HEAD:  Atraumatic, Normocephalic  EYES: EOMI, PERRLA, conjunctiva and sclera clear  ENMT: No tonsillar erythema, exudates, or enlargement; Moist mucous membranes, Good dentition, No lesions  NECK: Supple, No JVD, Normal thyroid  NERVOUS SYSTEM:  Alert & Oriented X3, No focal deficit   CHEST/LUNG: Good air entry bilateral with no  rales, rhonchi, wheezing, or rubs  HEART: Regular rate and rhythm; No murmurs, rubs, or gallops  ABDOMEN: Soft, Nontender, Nondistended; Bowel sounds present  EXTREMITIES:  2+ Peripheral Pulses, No clubbing, cyanosis, or edema    Care Discussed with Consultants/Other Providers [ x] YES  [ ] NO

## 2023-01-20 NOTE — PROGRESS NOTE ADULT - ASSESSMENT
Assessment  DMT2: 57y Male with DM T2 with hyperglycemia admitted with chest pain  A1C is 8.1%, patient was on insulin at home, blood sugars running high, now transitioned from IV insulin drip to SQ insulin and basal insulin, tolerating PO intake  CAD: CABG, on medications, monitored, asymptomatic.  HTN: On medications, monitored, asymptomatic.                Guerrero Sequeira MD  Cell:  [phone]  Office: [phone]               Assessment  DMT2: 57y Male with DM T2 with hyperglycemia admitted with chest pain  A1C is 8.1%, patient was on insulin at home, blood sugars running high, now transitioned from IV insulin drip to SQ  insulin and basal insulin, tolerating PO intake  CAD: CABG, on medications, monitored, asymptomatic.  HTN: On medications, monitored, asymptomatic.                Guerrero Sequeira MD  Cell:  [phone]  Office: [phone]

## 2023-01-20 NOTE — PROGRESS NOTE ADULT - SUBJECTIVE AND OBJECTIVE BOX
Chief complaint  Patient is a 57y old  Male who presents with a chief complaint of CABG eval (20 Jan 2023 08:12)     Patient in chair, looks comfortable. Offers no complaints.     Labs and Fingersticks  CAPILLARY BLOOD GLUCOSE      POCT Blood Glucose.: 248 mg/dL (20 Jan 2023 11:30)  POCT Blood Glucose.: 161 mg/dL (20 Jan 2023 07:33)  POCT Blood Glucose.: 207 mg/dL (19 Jan 2023 21:54)  POCT Blood Glucose.: 217 mg/dL (19 Jan 2023 16:39)  POCT Blood Glucose.: 207 mg/dL (19 Jan 2023 13:00)      Anion Gap, Serum: 9 (01-20 @ 06:07)  Anion Gap, Serum: 12 (01-19 @ 00:26)      Calcium, Total Serum: 8.4 (01-20 @ 06:07)  Calcium, Total Serum: 8.6 (01-19 @ 00:26)  Albumin, Serum: 2.9 *L* (01-20 @ 06:07)  Albumin, Serum: 3.6 (01-19 @ 00:26)    Alanine Aminotransferase (ALT/SGPT): 26 (01-20 @ 06:07)  Alanine Aminotransferase (ALT/SGPT): 36 (01-19 @ 00:26)  Alkaline Phosphatase, Serum: 63 (01-20 @ 06:07)  Alkaline Phosphatase, Serum: 58 (01-19 @ 00:26)  Aspartate Aminotransferase (AST/SGOT): 21 (01-20 @ 06:07)  Aspartate Aminotransferase (AST/SGOT): 44 *H* (01-19 @ 00:26)        01-20    134<L>  |  98  |  17  ----------------------------<  149<H>  3.7   |  27  |  0.66    Ca    8.4      20 Jan 2023 06:07  Phos  1.9     01-20  Mg     2.1     01-20    TPro  5.9<L>  /  Alb  2.9<L>  /  TBili  0.3  /  DBili  x   /  AST  21  /  ALT  26  /  AlkPhos  63  01-20                        8.5    11.55 )-----------( 186      ( 20 Jan 2023 06:07 )             26.5     Medications  MEDICATIONS  (STANDING):  acetaminophen     Tablet .. 650 milliGRAM(s) Oral every 6 hours  amLODIPine   Tablet 10 milliGRAM(s) Oral daily  ascorbic acid 500 milliGRAM(s) Oral two times a day  aspirin enteric coated 325 milliGRAM(s) Oral daily  atorvastatin 80 milliGRAM(s) Oral at bedtime  bisacodyl Suppository 10 milliGRAM(s) Rectal once  chlorhexidine 2% Cloths 1 Application(s) Topical daily  clopidogrel Tablet 75 milliGRAM(s) Oral daily  divalproex DR 1500 milliGRAM(s) Oral at bedtime  enoxaparin Injectable 30 milliGRAM(s) SubCutaneous every 12 hours  escitalopram 10 milliGRAM(s) Oral daily  gabapentin 100 milliGRAM(s) Oral every 8 hours  insulin glargine Injectable (LANTUS) 54 Unit(s) SubCutaneous at bedtime  insulin lispro (ADMELOG) corrective regimen sliding scale   SubCutaneous three times a day before meals  insulin lispro (ADMELOG) corrective regimen sliding scale   SubCutaneous at bedtime  insulin lispro Injectable (ADMELOG) 21 Unit(s) SubCutaneous three times a day with meals  iron sucrose IVPB 100 milliGRAM(s) IV Intermittent every 24 hours  melatonin 5 milliGRAM(s) Oral at bedtime  metoprolol tartrate 50 milliGRAM(s) Oral every 12 hours  pantoprazole    Tablet 40 milliGRAM(s) Oral before breakfast  polyethylene glycol 3350 17 Gram(s) Oral daily  potassium bicarbonate/potassium citrate 25 milliEquivalent(s) Oral every 1 hour  potassium phosphate / sodium phosphate Tablet (K-PHOS No. 2) 1 Tablet(s) Oral four times a day with meals  QUEtiapine 100 milliGRAM(s) Oral at bedtime  senna 2 Tablet(s) Oral at bedtime  sodium chloride 0.9%. 1000 milliLiter(s) (10 mL/Hr) IV Continuous <Continuous>  spironolactone 25 milliGRAM(s) Oral daily      Physical Exam  General: Patient comfortable in chair   Vital Signs Last 12 Hrs  T(F): 98.3 (01-20-23 @ 11:00), Max: 98.3 (01-20-23 @ 11:00)  HR: 80 (01-20-23 @ 11:00) (70 - 80)  BP: 118/60 (01-20-23 @ 11:00) (101/58 - 129/59)  BP(mean): 81 (01-20-23 @ 11:00) (75 - 85)  RR: 18 (01-20-23 @ 11:00) (18 - 18)  SpO2: 94% (01-20-23 @ 11:00) (94% - 97%)  CVS: S1S2, No murmurs  Respiratory: No wheezing, no crepitations  GI: Abdomen soft, bowel sounds positive  Musculoskeletal: trace edema lower extremities.   Skin: No skin rashes, no ecchymosis, midsternal inc, central line              Chief complaint  Patient is a 57y old  Male who presents with a chief complaint of CABG eval (20 Jan 2023 08:12)     Patient in chair, looks comfortable. Offers no complaints.     Labs and Fingersticks  CAPILLARY BLOOD GLUCOSE      POCT Blood Glucose.: 248 mg/dL (20 Jan 2023 11:30)  POCT Blood Glucose.: 161 mg/dL (20 Jan 2023 07:33)  POCT Blood Glucose.: 207 mg/dL (19 Jan 2023 21:54)  POCT Blood Glucose.: 217 mg/dL (19 Jan 2023 16:39)  POCT Blood Glucose.: 207 mg/dL (19 Jan 2023 13:00)      Anion Gap, Serum: 9 (01-20 @ 06:07)  Anion Gap, Serum: 12 (01-19 @ 00:26)      Calcium, Total Serum: 8.4 (01-20 @ 06:07)  Calcium, Total Serum: 8.6 (01-19 @ 00:26)  Albumin, Serum: 2.9 *L* (01-20 @ 06:07)  Albumin, Serum: 3.6 (01-19 @ 00:26)    Alanine Aminotransferase (ALT/SGPT): 26 (01-20 @ 06:07)  Alanine Aminotransferase (ALT/SGPT): 36 (01-19 @ 00:26)  Alkaline Phosphatase, Serum: 63 (01-20 @ 06:07)  Alkaline Phosphatase, Serum: 58 (01-19 @ 00:26)  Aspartate Aminotransferase (AST/SGOT): 21 (01-20 @ 06:07)  Aspartate Aminotransferase (AST/SGOT): 44 *H* (01-19 @ 00:26)        01-20    134<L>  |  98  |  17  ----------------------------<  149<H>  3.7   |  27  |  0.66    Ca    8.4      20 Jan 2023 06:07  Phos  1.9     01-20  Mg     2.1     01-20    TPro  5.9<L>  /  Alb  2.9<L>  /  TBili  0.3  /  DBili  x   /  AST  21  /  ALT  26  /  AlkPhos  63  01-20                        8.5    11.55 )-----------( 186      ( 20 Jan 2023 06:07 )             26.5     Medications  MEDICATIONS  (STANDING):  acetaminophen     Tablet .. 650 milliGRAM(s) Oral every 6 hours  amLODIPine   Tablet 10 milliGRAM(s) Oral daily  ascorbic acid 500 milliGRAM(s) Oral two times a day  aspirin enteric coated 325 milliGRAM(s) Oral daily  atorvastatin 80 milliGRAM(s) Oral at bedtime  bisacodyl Suppository 10 milliGRAM(s) Rectal once  chlorhexidine 2% Cloths 1 Application(s) Topical daily  clopidogrel Tablet 75 milliGRAM(s) Oral daily  divalproex DR 1500 milliGRAM(s) Oral at bedtime  enoxaparin Injectable 30 milliGRAM(s) SubCutaneous every 12 hours  escitalopram 10 milliGRAM(s) Oral daily  gabapentin 100 milliGRAM(s) Oral every 8 hours  insulin glargine Injectable (LANTUS) 54 Unit(s) SubCutaneous at bedtime  insulin lispro (ADMELOG) corrective regimen sliding scale   SubCutaneous three times a day before meals  insulin lispro (ADMELOG) corrective regimen sliding scale   SubCutaneous at bedtime  insulin lispro Injectable (ADMELOG) 21 Unit(s) SubCutaneous three times a day with meals  iron sucrose IVPB 100 milliGRAM(s) IV Intermittent every 24 hours  melatonin 5 milliGRAM(s) Oral at bedtime  metoprolol tartrate 50 milliGRAM(s) Oral every 12 hours  pantoprazole    Tablet 40 milliGRAM(s) Oral before breakfast  polyethylene glycol 3350 17 Gram(s) Oral daily  potassium bicarbonate/potassium citrate 25 milliEquivalent(s) Oral every 1 hour  potassium phosphate / sodium phosphate Tablet (K-PHOS No. 2) 1 Tablet(s) Oral four times a day with meals  QUEtiapine 100 milliGRAM(s) Oral at bedtime  senna 2 Tablet(s) Oral at bedtime  sodium chloride 0.9%. 1000 milliLiter(s) (10 mL/Hr) IV Continuous <Continuous>  spironolactone 25 milliGRAM(s) Oral daily      Physical Exam  General: Patient comfortable in chair   Vital Signs Last 12 Hrs  T(F): 98.3 (01-20-23 @ 11:00), Max: 98.3 (01-20-23 @ 11:00)  HR: 80 (01-20-23 @ 11:00) (70 - 80)  BP: 118/60 (01-20-23 @ 11:00) (101/58 - 129/59)  BP(mean): 81 (01-20-23 @ 11:00) (75 - 85)  RR: 18 (01-20-23 @ 11:00) (18 - 18)  SpO2: 94% (01-20-23 @ 11:00) (94% - 97%)  CVS: S1S2, No murmurs  Respiratory: No wheezing, no crepitations  GI: Abdomen soft, bowel sounds positive  Musculoskeletal: trace edema lower extremities.   Skin: No skin rashes, no ecchymosis, midsternal inc, central line

## 2023-01-20 NOTE — PROGRESS NOTE ADULT - PROBLEM SELECTOR PLAN 1
Will increase Lantus  54 units at bed time.  Will increase Humalog 21 units before each meal in addition to Humalog low correction scale coverage.  Suggest to start insulin pen injection teaching, may DC home on insulin.  Patient counseled for compliance with consistent low carb diet and exercise as tolerated outpatient.   Will continue monitoring  blood sugars trend, will Follow up.

## 2023-01-21 LAB
ALBUMIN SERPL ELPH-MCNC: 3 G/DL — LOW (ref 3.3–5)
ALP SERPL-CCNC: 64 U/L — SIGNIFICANT CHANGE UP (ref 40–120)
ALT FLD-CCNC: 27 U/L — SIGNIFICANT CHANGE UP (ref 10–45)
ANION GAP SERPL CALC-SCNC: 12 MMOL/L — SIGNIFICANT CHANGE UP (ref 5–17)
AST SERPL-CCNC: 23 U/L — SIGNIFICANT CHANGE UP (ref 10–40)
BASOPHILS # BLD AUTO: 0.04 K/UL — SIGNIFICANT CHANGE UP (ref 0–0.2)
BASOPHILS NFR BLD AUTO: 0.4 % — SIGNIFICANT CHANGE UP (ref 0–2)
BILIRUB SERPL-MCNC: 0.3 MG/DL — SIGNIFICANT CHANGE UP (ref 0.2–1.2)
BUN SERPL-MCNC: 15 MG/DL — SIGNIFICANT CHANGE UP (ref 7–23)
CALCIUM SERPL-MCNC: 8.2 MG/DL — LOW (ref 8.4–10.5)
CHLORIDE SERPL-SCNC: 99 MMOL/L — SIGNIFICANT CHANGE UP (ref 96–108)
CO2 SERPL-SCNC: 27 MMOL/L — SIGNIFICANT CHANGE UP (ref 22–31)
CREAT SERPL-MCNC: 0.58 MG/DL — SIGNIFICANT CHANGE UP (ref 0.5–1.3)
EGFR: 114 ML/MIN/1.73M2 — SIGNIFICANT CHANGE UP
EOSINOPHIL # BLD AUTO: 0.24 K/UL — SIGNIFICANT CHANGE UP (ref 0–0.5)
EOSINOPHIL NFR BLD AUTO: 2.2 % — SIGNIFICANT CHANGE UP (ref 0–6)
GLUCOSE BLDC GLUCOMTR-MCNC: 105 MG/DL — HIGH (ref 70–99)
GLUCOSE BLDC GLUCOMTR-MCNC: 123 MG/DL — HIGH (ref 70–99)
GLUCOSE BLDC GLUCOMTR-MCNC: 147 MG/DL — HIGH (ref 70–99)
GLUCOSE BLDC GLUCOMTR-MCNC: 153 MG/DL — HIGH (ref 70–99)
GLUCOSE BLDC GLUCOMTR-MCNC: 65 MG/DL — LOW (ref 70–99)
GLUCOSE BLDC GLUCOMTR-MCNC: 74 MG/DL — SIGNIFICANT CHANGE UP (ref 70–99)
GLUCOSE BLDC GLUCOMTR-MCNC: 98 MG/DL — SIGNIFICANT CHANGE UP (ref 70–99)
GLUCOSE BLDC GLUCOMTR-MCNC: 99 MG/DL — SIGNIFICANT CHANGE UP (ref 70–99)
GLUCOSE SERPL-MCNC: 82 MG/DL — SIGNIFICANT CHANGE UP (ref 70–99)
HCT VFR BLD CALC: 26.1 % — LOW (ref 39–50)
HGB BLD-MCNC: 8.6 G/DL — LOW (ref 13–17)
IMM GRANULOCYTES NFR BLD AUTO: 1.5 % — HIGH (ref 0–0.9)
LYMPHOCYTES # BLD AUTO: 2.66 K/UL — SIGNIFICANT CHANGE UP (ref 1–3.3)
LYMPHOCYTES # BLD AUTO: 23.8 % — SIGNIFICANT CHANGE UP (ref 13–44)
MCHC RBC-ENTMCNC: 28.9 PG — SIGNIFICANT CHANGE UP (ref 27–34)
MCHC RBC-ENTMCNC: 33 GM/DL — SIGNIFICANT CHANGE UP (ref 32–36)
MCV RBC AUTO: 87.6 FL — SIGNIFICANT CHANGE UP (ref 80–100)
MONOCYTES # BLD AUTO: 1.38 K/UL — HIGH (ref 0–0.9)
MONOCYTES NFR BLD AUTO: 12.4 % — SIGNIFICANT CHANGE UP (ref 2–14)
NEUTROPHILS # BLD AUTO: 6.67 K/UL — SIGNIFICANT CHANGE UP (ref 1.8–7.4)
NEUTROPHILS NFR BLD AUTO: 59.7 % — SIGNIFICANT CHANGE UP (ref 43–77)
NRBC # BLD: 0 /100 WBCS — SIGNIFICANT CHANGE UP (ref 0–0)
PLATELET # BLD AUTO: 220 K/UL — SIGNIFICANT CHANGE UP (ref 150–400)
POTASSIUM SERPL-MCNC: 3.4 MMOL/L — LOW (ref 3.5–5.3)
POTASSIUM SERPL-SCNC: 3.4 MMOL/L — LOW (ref 3.5–5.3)
PROT SERPL-MCNC: 5.9 G/DL — LOW (ref 6–8.3)
RBC # BLD: 2.98 M/UL — LOW (ref 4.2–5.8)
RBC # FLD: 15.5 % — HIGH (ref 10.3–14.5)
SODIUM SERPL-SCNC: 138 MMOL/L — SIGNIFICANT CHANGE UP (ref 135–145)
WBC # BLD: 11.16 K/UL — HIGH (ref 3.8–10.5)
WBC # FLD AUTO: 11.16 K/UL — HIGH (ref 3.8–10.5)

## 2023-01-21 PROCEDURE — 71045 X-RAY EXAM CHEST 1 VIEW: CPT | Mod: 26

## 2023-01-21 RX ORDER — TAMSULOSIN HYDROCHLORIDE 0.4 MG/1
0.4 CAPSULE ORAL AT BEDTIME
Refills: 0 | Status: DISCONTINUED | OUTPATIENT
Start: 2023-01-21 | End: 2023-01-23

## 2023-01-21 RX ORDER — POTASSIUM BICARBONATE 978 MG/1
25 TABLET, EFFERVESCENT ORAL
Refills: 0 | Status: COMPLETED | OUTPATIENT
Start: 2023-01-21 | End: 2023-01-21

## 2023-01-21 RX ORDER — INSULIN LISPRO 100/ML
22 VIAL (ML) SUBCUTANEOUS
Refills: 0 | Status: DISCONTINUED | OUTPATIENT
Start: 2023-01-21 | End: 2023-01-22

## 2023-01-21 RX ORDER — ACETAMINOPHEN 500 MG
650 TABLET ORAL EVERY 6 HOURS
Refills: 0 | Status: DISCONTINUED | OUTPATIENT
Start: 2023-01-21 | End: 2023-01-23

## 2023-01-21 RX ORDER — INSULIN GLARGINE 100 [IU]/ML
48 INJECTION, SOLUTION SUBCUTANEOUS AT BEDTIME
Refills: 0 | Status: DISCONTINUED | OUTPATIENT
Start: 2023-01-21 | End: 2023-01-22

## 2023-01-21 RX ORDER — METOPROLOL TARTRATE 50 MG
100 TABLET ORAL DAILY
Refills: 0 | Status: DISCONTINUED | OUTPATIENT
Start: 2023-01-21 | End: 2023-01-21

## 2023-01-21 RX ORDER — METOPROLOL TARTRATE 50 MG
75 TABLET ORAL DAILY
Refills: 0 | Status: DISCONTINUED | OUTPATIENT
Start: 2023-01-21 | End: 2023-01-23

## 2023-01-21 RX ADMIN — Medication 1 TABLET(S): at 08:29

## 2023-01-21 RX ADMIN — GABAPENTIN 100 MILLIGRAM(S): 400 CAPSULE ORAL at 12:08

## 2023-01-21 RX ADMIN — OXYCODONE HYDROCHLORIDE 5 MILLIGRAM(S): 5 TABLET ORAL at 06:49

## 2023-01-21 RX ADMIN — Medication 50 MILLIGRAM(S): at 05:43

## 2023-01-21 RX ADMIN — Medication 500 MILLIGRAM(S): at 17:01

## 2023-01-21 RX ADMIN — OXYCODONE HYDROCHLORIDE 5 MILLIGRAM(S): 5 TABLET ORAL at 19:41

## 2023-01-21 RX ADMIN — Medication 650 MILLIGRAM(S): at 15:51

## 2023-01-21 RX ADMIN — OXYCODONE HYDROCHLORIDE 5 MILLIGRAM(S): 5 TABLET ORAL at 11:32

## 2023-01-21 RX ADMIN — DIVALPROEX SODIUM 1500 MILLIGRAM(S): 500 TABLET, DELAYED RELEASE ORAL at 22:04

## 2023-01-21 RX ADMIN — GABAPENTIN 100 MILLIGRAM(S): 400 CAPSULE ORAL at 05:43

## 2023-01-21 RX ADMIN — GABAPENTIN 100 MILLIGRAM(S): 400 CAPSULE ORAL at 22:04

## 2023-01-21 RX ADMIN — Medication 40 MILLIGRAM(S): at 05:43

## 2023-01-21 RX ADMIN — PANTOPRAZOLE SODIUM 40 MILLIGRAM(S): 20 TABLET, DELAYED RELEASE ORAL at 05:43

## 2023-01-21 RX ADMIN — ESCITALOPRAM OXALATE 10 MILLIGRAM(S): 10 TABLET, FILM COATED ORAL at 12:07

## 2023-01-21 RX ADMIN — Medication 75 MILLIGRAM(S): at 16:55

## 2023-01-21 RX ADMIN — CLOPIDOGREL BISULFATE 75 MILLIGRAM(S): 75 TABLET, FILM COATED ORAL at 12:07

## 2023-01-21 RX ADMIN — POTASSIUM BICARBONATE 25 MILLIEQUIVALENT(S): 978 TABLET, EFFERVESCENT ORAL at 10:06

## 2023-01-21 RX ADMIN — Medication 22 UNIT(S): at 08:20

## 2023-01-21 RX ADMIN — POTASSIUM BICARBONATE 25 MILLIEQUIVALENT(S): 978 TABLET, EFFERVESCENT ORAL at 12:05

## 2023-01-21 RX ADMIN — OXYCODONE HYDROCHLORIDE 5 MILLIGRAM(S): 5 TABLET ORAL at 11:02

## 2023-01-21 RX ADMIN — Medication 500 MILLIGRAM(S): at 05:44

## 2023-01-21 RX ADMIN — SPIRONOLACTONE 25 MILLIGRAM(S): 25 TABLET, FILM COATED ORAL at 05:43

## 2023-01-21 RX ADMIN — TAMSULOSIN HYDROCHLORIDE 0.4 MILLIGRAM(S): 0.4 CAPSULE ORAL at 22:05

## 2023-01-21 RX ADMIN — Medication 650 MILLIGRAM(S): at 15:21

## 2023-01-21 RX ADMIN — OXYCODONE HYDROCHLORIDE 5 MILLIGRAM(S): 5 TABLET ORAL at 20:11

## 2023-01-21 RX ADMIN — POTASSIUM BICARBONATE 25 MILLIEQUIVALENT(S): 978 TABLET, EFFERVESCENT ORAL at 11:03

## 2023-01-21 RX ADMIN — ENOXAPARIN SODIUM 30 MILLIGRAM(S): 100 INJECTION SUBCUTANEOUS at 16:58

## 2023-01-21 RX ADMIN — OXYCODONE HYDROCHLORIDE 5 MILLIGRAM(S): 5 TABLET ORAL at 05:51

## 2023-01-21 RX ADMIN — ENOXAPARIN SODIUM 30 MILLIGRAM(S): 100 INJECTION SUBCUTANEOUS at 05:45

## 2023-01-21 RX ADMIN — Medication 5 MILLIGRAM(S): at 22:06

## 2023-01-21 RX ADMIN — INSULIN GLARGINE 48 UNIT(S): 100 INJECTION, SOLUTION SUBCUTANEOUS at 22:01

## 2023-01-21 RX ADMIN — Medication 325 MILLIGRAM(S): at 12:07

## 2023-01-21 RX ADMIN — Medication 10 MILLIGRAM(S): at 13:41

## 2023-01-21 RX ADMIN — QUETIAPINE FUMARATE 100 MILLIGRAM(S): 200 TABLET, FILM COATED ORAL at 22:04

## 2023-01-21 RX ADMIN — POLYETHYLENE GLYCOL 3350 17 GRAM(S): 17 POWDER, FOR SOLUTION ORAL at 10:06

## 2023-01-21 RX ADMIN — Medication 22 UNIT(S): at 12:06

## 2023-01-21 RX ADMIN — CHLORHEXIDINE GLUCONATE 1 APPLICATION(S): 213 SOLUTION TOPICAL at 12:06

## 2023-01-21 RX ADMIN — ATORVASTATIN CALCIUM 80 MILLIGRAM(S): 80 TABLET, FILM COATED ORAL at 22:03

## 2023-01-21 NOTE — PROGRESS NOTE ADULT - SUBJECTIVE AND OBJECTIVE BOX
VITAL SIGNS    Telemetry:  [id number]    Vital Signs Last 24 Hrs  T(C): 36.5 (23 @ 10:53), Max: 36.7 (23 @ 14:29)  T(F): 97.7 (23 @ 10:53), Max: 98.1 (23 @ 14:29)  HR: 66 (23 @ 10:53) (66 - 81)  BP: 117/66 (23 @ 10:53) (102/59 - 147/70)  RR: 18 (23 @ 10:53) (18 - 18)  SpO2: 96% (23 @ 10:53) (96% - 98%)                    07:  -   @ 07:00  --------------------------------------------------------  IN: 390 mL / OUT: 1350 mL / NET: -960 mL     @ 07:01  -   @ 12:03  --------------------------------------------------------  IN: 120 mL / OUT: 0 mL / NET: 120 mL          Daily     Daily Weight in k.3 (2023 06:07)            CAPILLARY BLOOD GLUCOSE      POCT Blood Glucose.: 147 mg/dL (2023 11:00)  POCT Blood Glucose.: 99 mg/dL (2023 07:37)  POCT Blood Glucose.: 98 mg/dL (2023 02:28)  POCT Blood Glucose.: 117 mg/dL (2023 20:43)  POCT Blood Glucose.: 120 mg/dL (2023 16:12)                Pacing Wires        [  ]   Settings:                                  Isolated  [x  ] a wire x 1    Coumadin    [ ] YES          [ x ]      NO                                   PHYSICAL EXAM        Neurology: alert and oriented x 3, nonfocal, no gross deficits  CV : s1 s2 RRR  Sternal Wound :  CDI , Stable  Lungs: cta  Abdomen: soft, nontender, nondistended, positive bowel sounds                :    voiding      Extremities:    +  edema   /  -   calve tenderness ,    L leg    incisions cdi          acetaminophen     Tablet .. 650 milliGRAM(s) Oral every 6 hours PRN  ascorbic acid 500 milliGRAM(s) Oral two times a day  aspirin enteric coated 325 milliGRAM(s) Oral daily  atorvastatin 80 milliGRAM(s) Oral at bedtime  bisacodyl Suppository 10 milliGRAM(s) Rectal once  chlorhexidine 2% Cloths 1 Application(s) Topical daily  clopidogrel Tablet 75 milliGRAM(s) Oral daily  divalproex DR 1500 milliGRAM(s) Oral at bedtime  enoxaparin Injectable 30 milliGRAM(s) SubCutaneous every 12 hours  escitalopram 10 milliGRAM(s) Oral daily  furosemide    Tablet 40 milliGRAM(s) Oral daily  gabapentin 100 milliGRAM(s) Oral every 8 hours  insulin glargine Injectable (LANTUS) 48 Unit(s) SubCutaneous at bedtime  insulin lispro (ADMELOG) corrective regimen sliding scale   SubCutaneous three times a day before meals  insulin lispro (ADMELOG) corrective regimen sliding scale   SubCutaneous at bedtime  insulin lispro Injectable (ADMELOG) 22 Unit(s) SubCutaneous three times a day with meals  melatonin 5 milliGRAM(s) Oral at bedtime  metoprolol tartrate 50 milliGRAM(s) Oral every 12 hours  oxyCODONE    IR 5 milliGRAM(s) Oral every 4 hours PRN  pantoprazole    Tablet 40 milliGRAM(s) Oral before breakfast  polyethylene glycol 3350 17 Gram(s) Oral daily  potassium bicarbonate/potassium citrate 25 milliEquivalent(s) Oral every 1 hour  QUEtiapine 100 milliGRAM(s) Oral at bedtime  senna 2 Tablet(s) Oral at bedtime  sodium chloride 0.9%. 1000 milliLiter(s) IV Continuous <Continuous>  spironolactone 25 milliGRAM(s) Oral daily                    Physical Therapy Rec:   Home  [  ]   Home w/ PT  [  ]  Rehab  [  ]  Discussed with Cardiothoracic Team at AM rounds.

## 2023-01-21 NOTE — PROGRESS NOTE ADULT - SUBJECTIVE AND OBJECTIVE BOX
Cardiovascular Disease Progress Note  Date of service: 23 @ 09:56    Overnight events: No acute events overnight.  Pt is in mild distress 2/2 pain.   Otherwise review of systems negative    Objective Findings:  T(C): 36.5 (23 @ 08:07), Max: 36.8 (23 @ 11:00)  HR: 66 (23 @ 08:07) (66 - 81)  BP: 102/59 (23 @ 08:07) (102/59 - 147/70)  RR: 18 (23 @ 08:07) (18 - 18)  SpO2: 96% (23 @ 08:07) (94% - 98%)  Wt(kg): --  Daily     Daily Weight in k.3 (2023 06:07)      Physical Exam:  Gen: NAD; Patient resting comfortably  HEENT: EOMI, Normocephalic/ atraumatic  CV: RRR, normal S1 + S2, no m/r/g  Lungs:  Normal respiratory effort; clear to auscultation bilaterally  Abd: soft, non-tender; bowel sounds present  Ext: No edema; warm and well perfused    Telemetry: Sinus     Laboratory Data:                        8.6    11.16 )-----------( 220      ( 2023 05:01 )             26.1         138  |  99  |  15  ----------------------------<  82  3.4<L>   |  27  |  0.58    Ca    8.2<L>      2023 05:01  Phos  1.9       Mg     2.1         TPro  5.9<L>  /  Alb  3.0<L>  /  TBili  0.3  /  DBili  x   /  AST  23  /  ALT  27  /  AlkPhos  64                Inpatient Medications:  MEDICATIONS  (STANDING):  ascorbic acid 500 milliGRAM(s) Oral two times a day  aspirin enteric coated 325 milliGRAM(s) Oral daily  atorvastatin 80 milliGRAM(s) Oral at bedtime  bisacodyl Suppository 10 milliGRAM(s) Rectal once  chlorhexidine 2% Cloths 1 Application(s) Topical daily  clopidogrel Tablet 75 milliGRAM(s) Oral daily  divalproex DR 1500 milliGRAM(s) Oral at bedtime  enoxaparin Injectable 30 milliGRAM(s) SubCutaneous every 12 hours  escitalopram 10 milliGRAM(s) Oral daily  furosemide    Tablet 40 milliGRAM(s) Oral daily  gabapentin 100 milliGRAM(s) Oral every 8 hours  insulin glargine Injectable (LANTUS) 48 Unit(s) SubCutaneous at bedtime  insulin lispro (ADMELOG) corrective regimen sliding scale   SubCutaneous three times a day before meals  insulin lispro (ADMELOG) corrective regimen sliding scale   SubCutaneous at bedtime  insulin lispro Injectable (ADMELOG) 22 Unit(s) SubCutaneous three times a day with meals  melatonin 5 milliGRAM(s) Oral at bedtime  metoprolol tartrate 50 milliGRAM(s) Oral every 12 hours  pantoprazole    Tablet 40 milliGRAM(s) Oral before breakfast  polyethylene glycol 3350 17 Gram(s) Oral daily  potassium bicarbonate/potassium citrate 25 milliEquivalent(s) Oral every 1 hour  QUEtiapine 100 milliGRAM(s) Oral at bedtime  senna 2 Tablet(s) Oral at bedtime  sodium chloride 0.9%. 1000 milliLiter(s) (10 mL/Hr) IV Continuous <Continuous>  spironolactone 25 milliGRAM(s) Oral daily       Assessment:  57M /w Penicillin allergy, PMH  HTN, HLD, DM2, bipolar disorder, SHIN s/p uvula resection not on CPAP,  known CAD with MI with RCA/OM stents  who presented to his Cardiologist complaining of progressive SOB/LEMOS over the last 4 weeks.    Plan of Care:    #Multivessel CAD  - Noted on [facility] 2023  - s/p CABG x4 (LIMA-LAD, LSVG- PDA, LSVG-OM, LSVG-Diag)  - TTE  shows normal LV systolic function  - Continue ASA, Plavix statin and BB  - Management as per CTS    #HTN  - BP Acceptable on current regimen      #HLD  - Statin as ordered    #DM  - ISS   - Defer management to primary team            Over 25 minutes spent on total encounter; more than 50% of the visit was spent counseling and/or coordinating care by the attending physician.      Kevin Landrum,  [facility]  Cardiovascular Disease  [phone]

## 2023-01-21 NOTE — PROGRESS NOTE ADULT - SUBJECTIVE AND OBJECTIVE BOX
Date of Service  : 01-21-23     INTERVAL HPI/OVERNIGHT EVENTS: I feel fine.   Vital Signs Last 24 Hrs  T(C): 36.5 (21 Jan 2023 10:53), Max: 36.7 (20 Jan 2023 19:21)  T(F): 97.7 (21 Jan 2023 10:53), Max: 98.1 (20 Jan 2023 23:11)  HR: 66 (21 Jan 2023 10:53) (66 - 81)  BP: 117/66 (21 Jan 2023 10:53) (102/59 - 147/70)  BP(mean): 86 (21 Jan 2023 10:53) (76 - 101)  RR: 18 (21 Jan 2023 10:53) (18 - 18)  SpO2: 96% (21 Jan 2023 10:53) (96% - 98%)    Parameters below as of 21 Jan 2023 10:53  Patient On (Oxygen Delivery Method): nasal cannula      I&O's Summary    20 Jan 2023 07:01  -  21 Jan 2023 07:00  --------------------------------------------------------  IN: 390 mL / OUT: 1350 mL / NET: -960 mL    21 Jan 2023 07:01  -  21 Jan 2023 14:36  --------------------------------------------------------  IN: 120 mL / OUT: 800 mL / NET: -680 mL      MEDICATIONS  (STANDING):  ascorbic acid 500 milliGRAM(s) Oral two times a day  aspirin enteric coated 325 milliGRAM(s) Oral daily  atorvastatin 80 milliGRAM(s) Oral at bedtime  chlorhexidine 2% Cloths 1 Application(s) Topical daily  clopidogrel Tablet 75 milliGRAM(s) Oral daily  divalproex DR 1500 milliGRAM(s) Oral at bedtime  enoxaparin Injectable 30 milliGRAM(s) SubCutaneous every 12 hours  escitalopram 10 milliGRAM(s) Oral daily  furosemide    Tablet 40 milliGRAM(s) Oral daily  gabapentin 100 milliGRAM(s) Oral every 8 hours  insulin glargine Injectable (LANTUS) 48 Unit(s) SubCutaneous at bedtime  insulin lispro (ADMELOG) corrective regimen sliding scale   SubCutaneous three times a day before meals  insulin lispro (ADMELOG) corrective regimen sliding scale   SubCutaneous at bedtime  insulin lispro Injectable (ADMELOG) 22 Unit(s) SubCutaneous three times a day with meals  melatonin 5 milliGRAM(s) Oral at bedtime  metoprolol tartrate 50 milliGRAM(s) Oral every 12 hours  pantoprazole    Tablet 40 milliGRAM(s) Oral before breakfast  polyethylene glycol 3350 17 Gram(s) Oral daily  QUEtiapine 100 milliGRAM(s) Oral at bedtime  senna 2 Tablet(s) Oral at bedtime  sodium chloride 0.9%. 1000 milliLiter(s) (10 mL/Hr) IV Continuous <Continuous>  spironolactone 25 milliGRAM(s) Oral daily  tamsulosin 0.4 milliGRAM(s) Oral at bedtime    MEDICATIONS  (PRN):  acetaminophen     Tablet .. 650 milliGRAM(s) Oral every 6 hours PRN Mild Pain (1 - 3)  oxyCODONE    IR 5 milliGRAM(s) Oral every 4 hours PRN Moderate Pain (4 - 6)    LABS:                        8.6    11.16 )-----------( 220      ( 21 Jan 2023 05:01 )             26.1     01-21    138  |  99  |  15  ----------------------------<  82  3.4<L>   |  27  |  0.58    Ca    8.2<L>      21 Jan 2023 05:01  Phos  1.9     01-20  Mg     2.1     01-20    TPro  5.9<L>  /  Alb  3.0<L>  /  TBili  0.3  /  DBili  x   /  AST  23  /  ALT  27  /  AlkPhos  64  01-21        CAPILLARY BLOOD GLUCOSE      POCT Blood Glucose.: 147 mg/dL (21 Jan 2023 11:00)  POCT Blood Glucose.: 99 mg/dL (21 Jan 2023 07:37)  POCT Blood Glucose.: 98 mg/dL (21 Jan 2023 02:28)  POCT Blood Glucose.: 117 mg/dL (20 Jan 2023 20:43)  POCT Blood Glucose.: 120 mg/dL (20 Jan 2023 16:12)          REVIEW OF SYSTEMS:  CONSTITUTIONAL: No fever, weight loss, or fatigue  EYES: No eye pain, visual disturbances, or discharge  ENMT:  No difficulty hearing, tinnitus, vertigo; No sinus or throat pain  NECK: No pain or stiffness  RESPIRATORY: No cough, wheezing, chills or hemoptysis; No shortness of breath  CARDIOVASCULAR: No chest pain, palpitations, dizziness, or leg swelling  GASTROINTESTINAL: No abdominal or epigastric pain. No nausea, vomiting, or hematemesis; No diarrhea or constipation. No melena or hematochezia.  GENITOURINARY: No dysuria, frequency, hematuria, or incontinence  NEUROLOGICAL: No headaches, memory loss, loss of strength, numbness, or tremors      Consultant(s) Notes Reviewed:  [x ] YES  [ ] NO    PHYSICAL EXAM:  GENERAL: NAD, Obese   HEAD:  Atraumatic, Normocephalic  NECK: Supple, No JVD, Normal thyroid  NERVOUS SYSTEM:  Alert & Oriented X3, No focal deficit   CHEST/LUNG: Good air entry bilateral except bases  HEART: Regular rate and rhythm; No murmurs, rubs, or gallops  ABDOMEN: Soft, Nontender, Nondistended; Bowel sounds present  EXTREMITIES:  LLE dressed.     Care Discussed with Consultants/Other Providers [ x] YES  [ ] NO

## 2023-01-21 NOTE — PROGRESS NOTE ADULT - SUBJECTIVE AND OBJECTIVE BOX
Chief complaint    Patient is a 57y old  Male who presents with a chief complaint of CABG eval (21 Jan 2023 12:02)   Review of systems  Patient in bed, appears comfortable.    Labs and Fingersticks  CAPILLARY BLOOD GLUCOSE      POCT Blood Glucose.: 147 mg/dL (21 Jan 2023 11:00)  POCT Blood Glucose.: 99 mg/dL (21 Jan 2023 07:37)  POCT Blood Glucose.: 98 mg/dL (21 Jan 2023 02:28)  POCT Blood Glucose.: 117 mg/dL (20 Jan 2023 20:43)  POCT Blood Glucose.: 120 mg/dL (20 Jan 2023 16:12)      Anion Gap, Serum: 12 (01-21 @ 05:01)  Anion Gap, Serum: 9 (01-20 @ 06:07)      Calcium, Total Serum: 8.2 *L* (01-21 @ 05:01)  Calcium, Total Serum: 8.4 (01-20 @ 06:07)  Albumin, Serum: 3.0 *L* (01-21 @ 05:01)  Albumin, Serum: 2.9 *L* (01-20 @ 06:07)    Alanine Aminotransferase (ALT/SGPT): 27 (01-21 @ 05:01)  Alanine Aminotransferase (ALT/SGPT): 26 (01-20 @ 06:07)  Alkaline Phosphatase, Serum: 64 (01-21 @ 05:01)  Alkaline Phosphatase, Serum: 63 (01-20 @ 06:07)  Aspartate Aminotransferase (AST/SGOT): 23 (01-21 @ 05:01)  Aspartate Aminotransferase (AST/SGOT): 21 (01-20 @ 06:07)        01-21    138  |  99  |  15  ----------------------------<  82  3.4<L>   |  27  |  0.58    Ca    8.2<L>      21 Jan 2023 05:01  Phos  1.9     01-20  Mg     2.1     01-20    TPro  5.9<L>  /  Alb  3.0<L>  /  TBili  0.3  /  DBili  x   /  AST  23  /  ALT  27  /  AlkPhos  64  01-21                        8.6    11.16 )-----------( 220      ( 21 Jan 2023 05:01 )             26.1     Medications  MEDICATIONS  (STANDING):  ascorbic acid 500 milliGRAM(s) Oral two times a day  aspirin enteric coated 325 milliGRAM(s) Oral daily  atorvastatin 80 milliGRAM(s) Oral at bedtime  bisacodyl Suppository 10 milliGRAM(s) Rectal once  chlorhexidine 2% Cloths 1 Application(s) Topical daily  clopidogrel Tablet 75 milliGRAM(s) Oral daily  divalproex DR 1500 milliGRAM(s) Oral at bedtime  enoxaparin Injectable 30 milliGRAM(s) SubCutaneous every 12 hours  escitalopram 10 milliGRAM(s) Oral daily  furosemide    Tablet 40 milliGRAM(s) Oral daily  gabapentin 100 milliGRAM(s) Oral every 8 hours  insulin glargine Injectable (LANTUS) 48 Unit(s) SubCutaneous at bedtime  insulin lispro (ADMELOG) corrective regimen sliding scale   SubCutaneous three times a day before meals  insulin lispro (ADMELOG) corrective regimen sliding scale   SubCutaneous at bedtime  insulin lispro Injectable (ADMELOG) 22 Unit(s) SubCutaneous three times a day with meals  melatonin 5 milliGRAM(s) Oral at bedtime  metoprolol tartrate 50 milliGRAM(s) Oral every 12 hours  pantoprazole    Tablet 40 milliGRAM(s) Oral before breakfast  polyethylene glycol 3350 17 Gram(s) Oral daily  QUEtiapine 100 milliGRAM(s) Oral at bedtime  senna 2 Tablet(s) Oral at bedtime  sodium chloride 0.9%. 1000 milliLiter(s) (10 mL/Hr) IV Continuous <Continuous>  spironolactone 25 milliGRAM(s) Oral daily      Physical Exam  General: Patient appears comfortable.  Vital Signs Last 12 Hrs  T(F): 97.7 (01-21-23 @ 10:53), Max: 98 (01-21-23 @ 03:26)  HR: 66 (01-21-23 @ 10:53) (66 - 70)  BP: 117/66 (01-21-23 @ 10:53) (102/59 - 147/70)  BP(mean): 86 (01-21-23 @ 10:53) (78 - 101)  RR: 18 (01-21-23 @ 10:53) (18 - 18)  SpO2: 96% (01-21-23 @ 10:53) (96% - 96%)  Neck: No palpable thyroid nodules.  CVS: S1S2, No murmurs  Respiratory: No wheezing, no crepitations  GI: Abdomen soft, non tender.  Musculoskeletal:  edema lower extremities.     Diagnostics    Free Thyroxine, Serum: AM Sched. Collection: 15-Marco-2023 06:00 (01-14 @ 14:18)  Thyroid Stimulating Hormone, Serum: AM Sched. Collection: 15-Marco-2023 06:00 (01-14 @ 14:18)

## 2023-01-21 NOTE — PROGRESS NOTE ADULT - PROBLEM SELECTOR PLAN 1
Will decrease Lantus  48 units at bed time.  Will increase Humalog 22 units before each meal in addition to Humalog low correction scale coverage.  Suggest to start insulin pen injection teaching, may DC home on insulin.  Patient counseled for compliance with consistent low carb diet and exercise as tolerated outpatient.   Will continue monitoring  blood sugars trend, will Follow up.

## 2023-01-21 NOTE — PROGRESS NOTE ADULT - ASSESSMENT
57M /w Penicillin allergy, PMH  HTN, HLD, DM2, bipolar disorder, SHIN s/p uvula resection not on CPAP,  known CAD with MI with RCA/OM stents 2013 who presented to his Cardiologist complaining of progressive SOB/LEMOS over the last 4 weeks.  Denies having typical chest pain type symptoms.  Had abnormal stress test and underwent elective cath yesterday on 1/11/23 which revealed EF 65, pLAD 90, mLAD 20, dLAD 80, OM 99, mRCA 95 via RRA access.  Currently patient denies chest pain/sob.  No headache/dizziness.  No abdominal pain/nausea/vomiting.  No bowel/urinary symptoms.  No lower extremity pain/numbness/tingling.  No tooth pain.  Patient states he typically ambulates independently without use of assistive devices and lives with his wife in [place].       Problem/Recommendation - 1:  ·  Problem: CAD, multiple vessel.   ·  Recommendation: S/P CABG x4 vessel   CTS help appreciated .   S/P Extubation . On NC  oxygen.      Problem/Recommendation - 2:  ·  Problem: Uncontrolled  Diabetes.   ·  Recommendation: HgbA1C high.  Endo helping.   On Insulin.   Sugars under good control.     Problem/Recommendation - 3:  ·  Problem: Hypertension.   ·  Recommendation: BP readings fine.   BP meds  per CTICU .      Problem/Recommendation - 4:  ·  Problem: Hyperlipidemia.   ·  Recommendation: Statin.     Problem/Recommendation - 5:  ·  Problem: Bipolar disorder.   ·  Recommendation: Home meds.

## 2023-01-21 NOTE — PROGRESS NOTE ADULT - ASSESSMENT
1   Assessment  DMT2: 57y Male with DM T2 with hyperglycemia admitted with chest pain  A1C is 8.1%, patient was on insulin at home, on large dose basal bolus insulin, blood sugars are trending down, eating full meals.  CAD: CABG, on medications, monitored, asymptomatic.  HTN: On medications, monitored, asymptomatic.                Guerrero Sequeira MD  Cell:  [phone]  Office: [phone]

## 2023-01-21 NOTE — PROGRESS NOTE ADULT - ASSESSMENT
Patient is a 57M /w Penicillin allergy, PMH  HTN, HLD, DM2, bipolar disorder, SHIN s/p uvula resection not on CPAP,  known CAD with MI with RCA/OM stents 2013 who presented to his Cardiologist complaining of progressive SOB/LEMOS over the last 4 weeks.  Denies having typical chest pain type symptoms.  Had abnormal stress test and underwent elective cath yesterday on 1/11/23 which revealed EF 65, pLAD 90, mLAD 20, dLAD 80, OM 99, mRCA 95 via RRA access.  Now transferred to [facility] for CABG eval /w Dr. Barrientos.  Currently patient denies chest pain/sob.  No headache/dizziness.  No abdominal pain/nausea/vomiting.  No bowel/urinary symptoms.  No lower extremity pain/numbness/tingling.  No tooth pain.  Patient states he typically ambulates independently without use of assistive devices and lives with his wife in [place].      1/12/2023. Patient transferred to [facility] for CABG evaluation /w Dr. Barrientos to review cath images/TTE.    1/13 VSS overnight.  TTE done showing normal LV/RV function. Carotids without significant stenosis.  UA negative.  Started on weight based Lovenox 120mg BID   1/14 VSS TSH elevated 6.69 Dr Sequeira endocrine consulted CP free P2y12 99 repeat in am   1/15 Replete K. Continue with BBlocker, statin asa. P2Y12 pending Plan for CABG Tuesday 1/16 VSS, last dose Lovenox  1/17 s/p CABG x4 (LIMA-LAD, LSVG-OM, LSVG-PDA, LSVG-Diagonal)  iNSULIN INFUSION  eXTUBATED TO HIGH FLOW  aCUTE BLOOD LOSS ANEMIA- PRBC X 1  1/19 diuresis, weaned to NC o2  Bipolar meds resumed  Endo for glucose control  Transferred to sdu  1/20 MS #2 d/c last rosa, ms x 1 this am  D/c pleural as drainage decreases  ASmbulte  Glucose control  When glucose is stable may transfer to floor  1/21 d/c pw  Cont diuresis  Transition to toprol  Ra o2

## 2023-01-22 DIAGNOSIS — R33.9 RETENTION OF URINE, UNSPECIFIED: ICD-10-CM

## 2023-01-22 LAB
ALBUMIN SERPL ELPH-MCNC: 2.8 G/DL — LOW (ref 3.3–5)
ALP SERPL-CCNC: 66 U/L — SIGNIFICANT CHANGE UP (ref 40–120)
ALT FLD-CCNC: 22 U/L — SIGNIFICANT CHANGE UP (ref 10–45)
ANION GAP SERPL CALC-SCNC: 15 MMOL/L — SIGNIFICANT CHANGE UP (ref 5–17)
AST SERPL-CCNC: 22 U/L — SIGNIFICANT CHANGE UP (ref 10–40)
BILIRUB SERPL-MCNC: 0.4 MG/DL — SIGNIFICANT CHANGE UP (ref 0.2–1.2)
BUN SERPL-MCNC: 12 MG/DL — SIGNIFICANT CHANGE UP (ref 7–23)
CALCIUM SERPL-MCNC: 8.4 MG/DL — SIGNIFICANT CHANGE UP (ref 8.4–10.5)
CHLORIDE SERPL-SCNC: 96 MMOL/L — SIGNIFICANT CHANGE UP (ref 96–108)
CO2 SERPL-SCNC: 23 MMOL/L — SIGNIFICANT CHANGE UP (ref 22–31)
CREAT SERPL-MCNC: 0.63 MG/DL — SIGNIFICANT CHANGE UP (ref 0.5–1.3)
EGFR: 111 ML/MIN/1.73M2 — SIGNIFICANT CHANGE UP
GLUCOSE BLDC GLUCOMTR-MCNC: 141 MG/DL — HIGH (ref 70–99)
GLUCOSE BLDC GLUCOMTR-MCNC: 199 MG/DL — HIGH (ref 70–99)
GLUCOSE BLDC GLUCOMTR-MCNC: 72 MG/DL — SIGNIFICANT CHANGE UP (ref 70–99)
GLUCOSE BLDC GLUCOMTR-MCNC: 95 MG/DL — SIGNIFICANT CHANGE UP (ref 70–99)
GLUCOSE SERPL-MCNC: 88 MG/DL — SIGNIFICANT CHANGE UP (ref 70–99)
HCT VFR BLD CALC: 28.6 % — LOW (ref 39–50)
HGB BLD-MCNC: 9 G/DL — LOW (ref 13–17)
MAGNESIUM SERPL-MCNC: 2.1 MG/DL — SIGNIFICANT CHANGE UP (ref 1.6–2.6)
MCHC RBC-ENTMCNC: 28.1 PG — SIGNIFICANT CHANGE UP (ref 27–34)
MCHC RBC-ENTMCNC: 31.5 GM/DL — LOW (ref 32–36)
MCV RBC AUTO: 89.4 FL — SIGNIFICANT CHANGE UP (ref 80–100)
NRBC # BLD: 0 /100 WBCS — SIGNIFICANT CHANGE UP (ref 0–0)
PHOSPHATE SERPL-MCNC: 2.7 MG/DL — SIGNIFICANT CHANGE UP (ref 2.5–4.5)
PLATELET # BLD AUTO: 292 K/UL — SIGNIFICANT CHANGE UP (ref 150–400)
POTASSIUM SERPL-MCNC: 3.5 MMOL/L — SIGNIFICANT CHANGE UP (ref 3.5–5.3)
POTASSIUM SERPL-MCNC: 3.6 MMOL/L — SIGNIFICANT CHANGE UP (ref 3.5–5.3)
POTASSIUM SERPL-SCNC: 3.5 MMOL/L — SIGNIFICANT CHANGE UP (ref 3.5–5.3)
POTASSIUM SERPL-SCNC: 3.6 MMOL/L — SIGNIFICANT CHANGE UP (ref 3.5–5.3)
PROT SERPL-MCNC: 6.3 G/DL — SIGNIFICANT CHANGE UP (ref 6–8.3)
RBC # BLD: 3.2 M/UL — LOW (ref 4.2–5.8)
RBC # FLD: 15.9 % — HIGH (ref 10.3–14.5)
SODIUM SERPL-SCNC: 134 MMOL/L — LOW (ref 135–145)
WBC # BLD: 10.73 K/UL — HIGH (ref 3.8–10.5)
WBC # FLD AUTO: 10.73 K/UL — HIGH (ref 3.8–10.5)

## 2023-01-22 RX ORDER — ASPIRIN/CALCIUM CARB/MAGNESIUM 324 MG
81 TABLET ORAL DAILY
Refills: 0 | Status: DISCONTINUED | OUTPATIENT
Start: 2023-01-22 | End: 2023-01-23

## 2023-01-22 RX ORDER — OXYCODONE HYDROCHLORIDE 5 MG/1
10 TABLET ORAL EVERY 4 HOURS
Refills: 0 | Status: DISCONTINUED | OUTPATIENT
Start: 2023-01-22 | End: 2023-01-23

## 2023-01-22 RX ORDER — INSULIN LISPRO 100/ML
17 VIAL (ML) SUBCUTANEOUS
Refills: 0 | Status: DISCONTINUED | OUTPATIENT
Start: 2023-01-22 | End: 2023-01-23

## 2023-01-22 RX ORDER — INSULIN GLARGINE 100 [IU]/ML
42 INJECTION, SOLUTION SUBCUTANEOUS AT BEDTIME
Refills: 0 | Status: DISCONTINUED | OUTPATIENT
Start: 2023-01-22 | End: 2023-01-23

## 2023-01-22 RX ORDER — POTASSIUM BICARBONATE 978 MG/1
25 TABLET, EFFERVESCENT ORAL
Refills: 0 | Status: COMPLETED | OUTPATIENT
Start: 2023-01-22 | End: 2023-01-22

## 2023-01-22 RX ORDER — POTASSIUM CHLORIDE 20 MEQ
20 PACKET (EA) ORAL
Refills: 0 | Status: COMPLETED | OUTPATIENT
Start: 2023-01-22 | End: 2023-01-22

## 2023-01-22 RX ADMIN — ENOXAPARIN SODIUM 30 MILLIGRAM(S): 100 INJECTION SUBCUTANEOUS at 06:03

## 2023-01-22 RX ADMIN — GABAPENTIN 100 MILLIGRAM(S): 400 CAPSULE ORAL at 13:57

## 2023-01-22 RX ADMIN — POTASSIUM BICARBONATE 25 MILLIEQUIVALENT(S): 978 TABLET, EFFERVESCENT ORAL at 11:18

## 2023-01-22 RX ADMIN — GABAPENTIN 100 MILLIGRAM(S): 400 CAPSULE ORAL at 06:02

## 2023-01-22 RX ADMIN — Medication 20 MILLIEQUIVALENT(S): at 20:18

## 2023-01-22 RX ADMIN — OXYCODONE HYDROCHLORIDE 5 MILLIGRAM(S): 5 TABLET ORAL at 06:03

## 2023-01-22 RX ADMIN — OXYCODONE HYDROCHLORIDE 10 MILLIGRAM(S): 5 TABLET ORAL at 15:53

## 2023-01-22 RX ADMIN — POTASSIUM BICARBONATE 25 MILLIEQUIVALENT(S): 978 TABLET, EFFERVESCENT ORAL at 08:36

## 2023-01-22 RX ADMIN — PANTOPRAZOLE SODIUM 40 MILLIGRAM(S): 20 TABLET, DELAYED RELEASE ORAL at 06:02

## 2023-01-22 RX ADMIN — OXYCODONE HYDROCHLORIDE 10 MILLIGRAM(S): 5 TABLET ORAL at 15:23

## 2023-01-22 RX ADMIN — OXYCODONE HYDROCHLORIDE 10 MILLIGRAM(S): 5 TABLET ORAL at 21:54

## 2023-01-22 RX ADMIN — ESCITALOPRAM OXALATE 10 MILLIGRAM(S): 10 TABLET, FILM COATED ORAL at 11:24

## 2023-01-22 RX ADMIN — ATORVASTATIN CALCIUM 80 MILLIGRAM(S): 80 TABLET, FILM COATED ORAL at 21:44

## 2023-01-22 RX ADMIN — Medication 40 MILLIGRAM(S): at 06:02

## 2023-01-22 RX ADMIN — Medication 75 MILLIGRAM(S): at 16:58

## 2023-01-22 RX ADMIN — CLOPIDOGREL BISULFATE 75 MILLIGRAM(S): 75 TABLET, FILM COATED ORAL at 11:20

## 2023-01-22 RX ADMIN — OXYCODONE HYDROCHLORIDE 5 MILLIGRAM(S): 5 TABLET ORAL at 00:41

## 2023-01-22 RX ADMIN — Medication 325 MILLIGRAM(S): at 11:20

## 2023-01-22 RX ADMIN — POTASSIUM BICARBONATE 25 MILLIEQUIVALENT(S): 978 TABLET, EFFERVESCENT ORAL at 07:41

## 2023-01-22 RX ADMIN — SENNA PLUS 2 TABLET(S): 8.6 TABLET ORAL at 21:44

## 2023-01-22 RX ADMIN — OXYCODONE HYDROCHLORIDE 5 MILLIGRAM(S): 5 TABLET ORAL at 06:33

## 2023-01-22 RX ADMIN — OXYCODONE HYDROCHLORIDE 5 MILLIGRAM(S): 5 TABLET ORAL at 00:11

## 2023-01-22 RX ADMIN — OXYCODONE HYDROCHLORIDE 10 MILLIGRAM(S): 5 TABLET ORAL at 21:24

## 2023-01-22 RX ADMIN — TAMSULOSIN HYDROCHLORIDE 0.4 MILLIGRAM(S): 0.4 CAPSULE ORAL at 21:44

## 2023-01-22 RX ADMIN — Medication 20 MILLIEQUIVALENT(S): at 16:56

## 2023-01-22 RX ADMIN — SPIRONOLACTONE 25 MILLIGRAM(S): 25 TABLET, FILM COATED ORAL at 06:03

## 2023-01-22 RX ADMIN — CHLORHEXIDINE GLUCONATE 1 APPLICATION(S): 213 SOLUTION TOPICAL at 00:45

## 2023-01-22 RX ADMIN — Medication 5 MILLIGRAM(S): at 21:45

## 2023-01-22 RX ADMIN — Medication 1: at 11:19

## 2023-01-22 RX ADMIN — ENOXAPARIN SODIUM 30 MILLIGRAM(S): 100 INJECTION SUBCUTANEOUS at 17:03

## 2023-01-22 RX ADMIN — DIVALPROEX SODIUM 1500 MILLIGRAM(S): 500 TABLET, DELAYED RELEASE ORAL at 21:44

## 2023-01-22 RX ADMIN — INSULIN GLARGINE 42 UNIT(S): 100 INJECTION, SOLUTION SUBCUTANEOUS at 21:51

## 2023-01-22 RX ADMIN — Medication 17 UNIT(S): at 16:57

## 2023-01-22 RX ADMIN — Medication 17 UNIT(S): at 07:41

## 2023-01-22 RX ADMIN — Medication 17 UNIT(S): at 11:29

## 2023-01-22 RX ADMIN — Medication 20 MILLIEQUIVALENT(S): at 18:14

## 2023-01-22 RX ADMIN — Medication 500 MILLIGRAM(S): at 06:02

## 2023-01-22 RX ADMIN — QUETIAPINE FUMARATE 100 MILLIGRAM(S): 200 TABLET, FILM COATED ORAL at 21:44

## 2023-01-22 NOTE — PROGRESS NOTE ADULT - ASSESSMENT
Assessment  DMT2: 57y Male with DM T2 with hyperglycemia admitted with chest pain  A1C is 8.1%, patient was on insulin at home, now on large dose basal bolus insulin, blood sugars are trending down, had mild hypoglycemia, eating full meals.  CAD: CABG, on medications, monitored, asymptomatic.  HTN: On medications, monitored, asymptomatic.                Guerrero Sequeira MD  Cell:  [phone]  Office: [phone]

## 2023-01-22 NOTE — PROGRESS NOTE ADULT - PROBLEM SELECTOR PLAN 1
Will decrease Lantus  42 units at bed time.  Will increase Humalog 17 units before each meal in addition to Humalog low correction scale coverage.  DC Plan: May los DC home on current basal bolus insulin if blood sugars remain stable within acceptable range.  Patient counseled for compliance with consistent low carb diet and exercise as tolerated outpatient.   Will continue monitoring  blood sugars trend, will Follow up.

## 2023-01-22 NOTE — PROGRESS NOTE ADULT - PROBLEM SELECTOR PLAN 1
Asa  325 mg  qd  Toprol 75 qd ATOR 80,  Prevena MTI   Chest PT,    Incentive spirometry,   Ambulate   D/c chest tubes as drainage decreases  Chest PT  Dispostion to home vs rehab

## 2023-01-22 NOTE — PROGRESS NOTE ADULT - SUBJECTIVE AND OBJECTIVE BOX
Date of Service  : 01-22-23    INTERVAL HPI/OVERNIGHT EVENTS: I feel fine . Sister in room.   Vital Signs Last 24 Hrs  T(C): 36.7 (22 Jan 2023 12:09), Max: 37 (21 Jan 2023 19:53)  T(F): 98.1 (22 Jan 2023 12:09), Max: 98.6 (21 Jan 2023 19:53)  HR: 81 (22 Jan 2023 12:09) (76 - 84)  BP: 129/75 (22 Jan 2023 12:09) (116/70 - 129/75)  BP(mean): 85 (21 Jan 2023 16:52) (85 - 85)  RR: 18 (22 Jan 2023 12:09) (18 - 18)  SpO2: 92% (22 Jan 2023 12:09) (92% - 95%)    Parameters below as of 22 Jan 2023 12:09  Patient On (Oxygen Delivery Method): room air      I&O's Summary    21 Jan 2023 07:01  -  22 Jan 2023 07:00  --------------------------------------------------------  IN: 500 mL / OUT: 1400 mL / NET: -900 mL    22 Jan 2023 07:01  -  22 Jan 2023 16:35  --------------------------------------------------------  IN: 600 mL / OUT: 1400 mL / NET: -800 mL      MEDICATIONS  (STANDING):  aspirin enteric coated 81 milliGRAM(s) Oral daily  atorvastatin 80 milliGRAM(s) Oral at bedtime  chlorhexidine 2% Cloths 1 Application(s) Topical daily  clopidogrel Tablet 75 milliGRAM(s) Oral daily  divalproex DR 1500 milliGRAM(s) Oral at bedtime  enoxaparin Injectable 30 milliGRAM(s) SubCutaneous every 12 hours  escitalopram 10 milliGRAM(s) Oral daily  furosemide    Tablet 40 milliGRAM(s) Oral daily  insulin glargine Injectable (LANTUS) 42 Unit(s) SubCutaneous at bedtime  insulin lispro (ADMELOG) corrective regimen sliding scale   SubCutaneous three times a day before meals  insulin lispro (ADMELOG) corrective regimen sliding scale   SubCutaneous at bedtime  insulin lispro Injectable (ADMELOG) 17 Unit(s) SubCutaneous three times a day with meals  melatonin 5 milliGRAM(s) Oral at bedtime  metoprolol succinate ER 75 milliGRAM(s) Oral daily  pantoprazole    Tablet 40 milliGRAM(s) Oral before breakfast  polyethylene glycol 3350 17 Gram(s) Oral daily  QUEtiapine 100 milliGRAM(s) Oral at bedtime  senna 2 Tablet(s) Oral at bedtime  sodium chloride 0.9%. 1000 milliLiter(s) (10 mL/Hr) IV Continuous <Continuous>  spironolactone 25 milliGRAM(s) Oral daily  tamsulosin 0.4 milliGRAM(s) Oral at bedtime    MEDICATIONS  (PRN):  acetaminophen     Tablet .. 650 milliGRAM(s) Oral every 6 hours PRN Mild Pain (1 - 3)  oxyCODONE    IR 5 milliGRAM(s) Oral every 4 hours PRN Moderate Pain (4 - 6)  oxyCODONE    IR 10 milliGRAM(s) Oral every 4 hours PRN Severe Pain (7 - 10)    LABS:                        9.0    10.73 )-----------( 292      ( 22 Jan 2023 05:18 )             28.6     01-22    x   |  x   |  x   ----------------------------<  x   3.6   |  x   |  x     Ca    8.4      22 Jan 2023 05:17  Phos  2.7     01-22  Mg     2.1     01-22    TPro  6.3  /  Alb  2.8<L>  /  TBili  0.4  /  DBili  x   /  AST  22  /  ALT  22  /  AlkPhos  66  01-22        CAPILLARY BLOOD GLUCOSE      POCT Blood Glucose.: 72 mg/dL (22 Jan 2023 16:25)  POCT Blood Glucose.: 199 mg/dL (22 Jan 2023 11:04)  POCT Blood Glucose.: 95 mg/dL (22 Jan 2023 07:39)  POCT Blood Glucose.: 153 mg/dL (21 Jan 2023 21:47)  POCT Blood Glucose.: 123 mg/dL (21 Jan 2023 18:08)  POCT Blood Glucose.: 105 mg/dL (21 Jan 2023 17:02)          REVIEW OF SYSTEMS:  CONSTITUTIONAL: No fever, weight loss, or fatigue  EYES: No eye pain, visual disturbances, or discharge  ENMT:  No difficulty hearing, tinnitus, vertigo; No sinus or throat pain  NECK: No pain or stiffness  RESPIRATORY: No cough, wheezing, chills or hemoptysis; No shortness of breath  CARDIOVASCULAR: No chest pain, palpitations, dizziness, or leg swelling  GASTROINTESTINAL: No abdominal or epigastric pain. No nausea, vomiting, or hematemesis; No diarrhea or constipation. No melena or hematochezia.  GENITOURINARY: No dysuria, frequency, hematuria, or incontinence  NEUROLOGICAL: No headaches, memory loss, loss of strength, numbness, or tremors      Consultant(s) Notes Reviewed:  [x ] YES  [ ] NO    PHYSICAL EXAM:  GENERAL: NAD, well-groomed, well-developed ,not in any distress ,  HEAD:  Atraumatic, Normocephalic  EYES: EOMI, PERRLA, conjunctiva and sclera clear  ENMT: No tonsillar erythema, exudates, or enlargement; Moist mucous membranes, Good dentition, No lesions  NECK: Supple, No JVD, Normal thyroid  NERVOUS SYSTEM:  Alert & Oriented X3, No focal deficit   CHEST/LUNG: Good air entry bilateral with no  rales, rhonchi, wheezing, or rubs  HEART: Regular rate and rhythm; No murmurs, rubs, or gallops  ABDOMEN: Soft, Nontender, Nondistended; Bowel sounds present  EXTREMITIES:  LLE dressed     Care Discussed with Consultants/Other Providers [ x] YES  [ ] NO

## 2023-01-22 NOTE — PROGRESS NOTE ADULT - SUBJECTIVE AND OBJECTIVE BOX
Subjective " hello I am ok"    VITAL SIGNS    Telemetry:  NSR     Vital Signs Last 24 Hrs  T(C): 36.5 (23 @ 04:05), Max: 37 (23 @ 19:53)  T(F): 97.7 (23 @ 04:05), Max: 98.6 (23 @ 19:53)  HR: 77 (23 @ 04:05) (76 - 84)  BP: 121/72 (23 @ 04:05) (116/70 - 127/72)  RR: 18 (23 @ 04:05) (18 - 18)  SpO2: 94% (23 @ 04:05) (92% - 95%)            @ 07:01  -   @ 07:00  --------------------------------------------------------  IN: 500 mL / OUT: 1400 mL / NET: -900 mL     @ 07:  -   @ 11:33  --------------------------------------------------------  IN: 0 mL / OUT: 1100 mL / NET: -1100 mL    Daily Weight in k.5 (2023 08:41)                        9.0    10.73 )-----------( 292      ( 2023 05:18 )             28.6           134<L>  |  96  |  12  ----------------------------<  88  3.5   |  23  |  0.63    Ca    8.4      2023 05:17  Phos  2.7       Mg     2.1         TPro  6.3  /  Alb  2.8<L>  /  TBili  0.4  /  DBili  x   /  AST  22  /  ALT  22  /  AlkPhos  66      MEDICATIONS  (STANDING):  aspirin enteric coated 325 milliGRAM(s) Oral daily  atorvastatin 80 milliGRAM(s) Oral at bedtime  chlorhexidine 2% Cloths 1 Application(s) Topical daily  clopidogrel Tablet 75 milliGRAM(s) Oral daily  divalproex DR 1500 milliGRAM(s) Oral at bedtime  enoxaparin Injectable 30 milliGRAM(s) SubCutaneous every 12 hours  escitalopram 10 milliGRAM(s) Oral daily  furosemide    Tablet 40 milliGRAM(s) Oral daily  gabapentin 100 milliGRAM(s) Oral every 8 hours  insulin glargine Injectable (LANTUS) 42 Unit(s) SubCutaneous at bedtime  insulin lispro (ADMELOG) corrective regimen sliding scale   SubCutaneous three times a day before meals  insulin lispro (ADMELOG) corrective regimen sliding scale   SubCutaneous at bedtime  insulin lispro Injectable (ADMELOG) 17 Unit(s) SubCutaneous three times a day with meals  melatonin 5 milliGRAM(s) Oral at bedtime  metoprolol succinate ER 75 milliGRAM(s) Oral daily  pantoprazole    Tablet 40 milliGRAM(s) Oral before breakfast  polyethylene glycol 3350 17 Gram(s) Oral daily  QUEtiapine 100 milliGRAM(s) Oral at bedtime  senna 2 Tablet(s) Oral at bedtime  sodium chloride 0.9%. 1000 milliLiter(s) (10 mL/Hr) IV Continuous <Continuous>  spironolactone 25 milliGRAM(s) Oral daily  tamsulosin 0.4 milliGRAM(s) Oral at bedtime    MEDICATIONS  (PRN):  acetaminophen     Tablet .. 650 milliGRAM(s) Oral every 6 hours PRN Mild Pain (1 - 3)  oxyCODONE    IR 5 milliGRAM(s) Oral every 4 hours PRN Moderate Pain (4 - 6)        Bilirubin Total, Serum: 0.4 mg/dL ( @ 05:17)    CAPILLARY BLOOD GLUCOSE      POCT Blood Glucose.: 199 mg/dL (2023 11:04)  POCT Blood Glucose.: 95 mg/dL (2023 07:39)  POCT Blood Glucose.: 153 mg/dL (2023 21:47)  POCT Blood Glucose.: 123 mg/dL (2023 18:08)  POCT Blood Glucose.: 105 mg/dL (2023 17:02)  POCT Blood Glucose.: 65 mg/dL (2023 16:29)  POCT Blood Glucose.: 74 mg/dL (2023 16:29)                                PHYSICAL EXAM        Neurology: alert and oriented x 3, nonfocal, no gross deficits    CV : [id number]     Sternal Wound :  PREVENA  Stable    Lungs: B/l easy breath sound son room air     Abdomen: soft, nontender, nondistended, positive bowel sounds, last bowel movement     : Boyd placed                Extremities:   warm well perfused equal strength throughout   B/ll e+ DP + edema                                       Physical Therapy Rec:   Home  vs rehab     Discussed with Cardiothoracic Team at AM rounds.

## 2023-01-22 NOTE — PROGRESS NOTE ADULT - SUBJECTIVE AND OBJECTIVE BOX
Cardiovascular Disease Progress Note  Date of service: 23 @ 11:21    Overnight events: No acute events overnight.  Pt sitting in chair with wife at bedside. Denies chest pain.   Otherwise review of systems negative    Objective Findings:  T(C): 36.5 (23 @ 04:05), Max: 37 (23 @ 19:53)  HR: 77 (23 @ 04:05) (76 - 84)  BP: 121/72 (23 @ 04:05) (116/70 - 127/72)  RR: 18 (23 @ 04:05) (18 - 18)  SpO2: 94% (23 @ 04:05) (92% - 95%)  Wt(kg): --  Daily     Daily Weight in k.5 (2023 08:41)      Physical Exam:  Gen: NAD; Patient resting comfortably  HEENT: EOMI, Normocephalic/ atraumatic  CV: RRR, normal S1 + S2, no m/r/g  Lungs:  Normal respiratory effort; clear to auscultation bilaterally  Abd: soft, non-tender; bowel sounds present  Ext: No edema; warm and well perfused    Telemetry: Sinus     Laboratory Data:                        9.0    10.73 )-----------( 292      ( 2023 05:18 )             28.6         134<L>  |  96  |  12  ----------------------------<  88  3.5   |  23  |  0.63    Ca    8.4      2023 05:17  Phos  2.7       Mg     2.1         TPro  6.3  /  Alb  2.8<L>  /  TBili  0.4  /  DBili  x   /  AST    /  ALT  22  /  AlkPhos  66                Inpatient Medications:  MEDICATIONS  (STANDING):  aspirin enteric coated 325 milliGRAM(s) Oral daily  atorvastatin 80 milliGRAM(s) Oral at bedtime  chlorhexidine 2% Cloths 1 Application(s) Topical daily  clopidogrel Tablet 75 milliGRAM(s) Oral daily  divalproex DR 1500 milliGRAM(s) Oral at bedtime  enoxaparin Injectable 30 milliGRAM(s) SubCutaneous every 12 hours  escitalopram 10 milliGRAM(s) Oral daily  furosemide    Tablet 40 milliGRAM(s) Oral daily  gabapentin 100 milliGRAM(s) Oral every 8 hours  insulin glargine Injectable (LANTUS) 42 Unit(s) SubCutaneous at bedtime  insulin lispro (ADMELOG) corrective regimen sliding scale   SubCutaneous three times a day before meals  insulin lispro (ADMELOG) corrective regimen sliding scale   SubCutaneous at bedtime  insulin lispro Injectable (ADMELOG) 17 Unit(s) SubCutaneous three times a day with meals  melatonin 5 milliGRAM(s) Oral at bedtime  metoprolol succinate ER 75 milliGRAM(s) Oral daily  pantoprazole    Tablet 40 milliGRAM(s) Oral before breakfast  polyethylene glycol 3350 17 Gram(s) Oral daily  QUEtiapine 100 milliGRAM(s) Oral at bedtime  senna 2 Tablet(s) Oral at bedtime  sodium chloride 0.9%. 1000 milliLiter(s) (10 mL/Hr) IV Continuous <Continuous>  spironolactone 25 milliGRAM(s) Oral daily  tamsulosin 0.4 milliGRAM(s) Oral at bedtime       Assessment:  57M /w Penicillin allergy, PMH  HTN, HLD, DM2, bipolar disorder, SHIN s/p uvula resection not on CPAP,  known CAD with MI with RCA/OM stents  who presented to his Cardiologist complaining of progressive SOB/LEMOS over the last 4 weeks.    Plan of Care:    #Multivessel CAD  - Noted on [facility] 2023  - s/p CABG x4 (LIMA-LAD, LSVG- PDA, LSVG-OM, LSVG-Diag)  - TTE  shows normal LV systolic function  - Continue ASA, Plavix statin and BB  - Management as per CTS    #HTN  - BP Acceptable on current regimen      #HLD  - Statin as ordered    #DM  - ISS   - Defer management to primary team    #ACP (advance care planning)-  Advanced care planning was discussed with the patient.  Risks, benefits and alternatives of medical treatment and procedures were discussed in detail and all questions were answered. 30 additional minutes spent addressing advance care plans.    Over 25 minutes spent on total encounter; more than 50% of the visit was spent counseling and/or coordinating care by the attending physician.      Kevin Landrum,  [facility]  Cardiovascular Disease  [phone]

## 2023-01-22 NOTE — PROGRESS NOTE ADULT - ASSESSMENT
Patient is a 57M /w Penicillin allergy, PMH  HTN, HLD, DM2, bipolar disorder, SHIN s/p uvula resection not on CPAP,  known CAD with MI with RCA/OM stents 2013 who presented to his Cardiologist complaining of progressive SOB/LEMOS over the last 4 weeks.  Denies having typical chest pain type symptoms.  Had abnormal stress test and underwent elective cath yesterday on 1/11/23 which revealed EF 65, pLAD 90, mLAD 20, dLAD 80, OM 99, mRCA 95 via RRA access.  Now transferred to [facility] for CABG eval /w Dr. Barrientos.  Currently patient denies chest pain/sob.  No headache/dizziness.  No abdominal pain/nausea/vomiting.  No bowel/urinary symptoms.  No lower extremity pain/numbness/tingling.  No tooth pain.  Patient states he typically ambulates independently without use of assistive devices and lives with his wife in [place].      1/12/2023. Patient transferred to [facility] for CABG evaluation /w Dr. Barrientos to review cath images/TTE.    1/13 VSS overnight.  TTE done showing normal LV/RV function. Carotids without significant stenosis.  UA negative.  Started on weight based Lovenox 120mg BID   1/14 VSS TSH elevated 6.69 Dr Sequeira endocrine consulted CP free P2y12 99 repeat in am   1/15 Replete K. Continue with BBlocker, statin asa. P2Y12 pending Plan for CABG Tuesday 1/16 VSS, last dose Lovenox  1/17 s/p CABG x4 (LIMA-LAD, LSVG-OM, LSVG-PDA, LSVG-Diagonal)  iNSULIN INFUSION  eXTUBATED TO HIGH FLOW  aCUTE BLOOD LOSS ANEMIA- PRBC X 1  1/19 diuresis, weaned to NC o2  Bipolar meds resumed  Endo for glucose control  Transferred to sdu  1/20 MS #2 d/c last rosa, ms x 1 this am  D/c pleural as drainage decreases ASmbulte Glucose control  When glucose is stable may transfer to floor  1/21 d/c pw Cont diuresis  Transition to toprol Ra o2  1/22 VSS  ambulating  watkins placed 1/21   Flomax initiated-   likely remove Watkins 1/23  dispotion to home vs rehab ambulating

## 2023-01-22 NOTE — PROGRESS NOTE ADULT - SUBJECTIVE AND OBJECTIVE BOX
Chief complaint    Patient is a 57y old  Male who presents with a chief complaint of CABG eval (21 Jan 2023 13:27)   Review of systems  Patient in bed, appears comfortable.    Labs and Fingersticks  CAPILLARY BLOOD GLUCOSE      POCT Blood Glucose.: 95 mg/dL (22 Jan 2023 07:39)  POCT Blood Glucose.: 153 mg/dL (21 Jan 2023 21:47)  POCT Blood Glucose.: 123 mg/dL (21 Jan 2023 18:08)  POCT Blood Glucose.: 105 mg/dL (21 Jan 2023 17:02)  POCT Blood Glucose.: 65 mg/dL (21 Jan 2023 16:29)  POCT Blood Glucose.: 74 mg/dL (21 Jan 2023 16:29)  POCT Blood Glucose.: 147 mg/dL (21 Jan 2023 11:00)      Anion Gap, Serum: 15 (01-22 @ 05:17)  Anion Gap, Serum: 12 (01-21 @ 05:01)      Calcium, Total Serum: 8.4 (01-22 @ 05:17)  Calcium, Total Serum: 8.2 *L* (01-21 @ 05:01)  Albumin, Serum: 2.8 *L* (01-22 @ 05:17)  Albumin, Serum: 3.0 *L* (01-21 @ 05:01)    Alanine Aminotransferase (ALT/SGPT): 22 (01-22 @ 05:17)  Alanine Aminotransferase (ALT/SGPT): 27 (01-21 @ 05:01)  Alkaline Phosphatase, Serum: 66 (01-22 @ 05:17)  Alkaline Phosphatase, Serum: 64 (01-21 @ 05:01)  Aspartate Aminotransferase (AST/SGOT): 22 (01-22 @ 05:17)  Aspartate Aminotransferase (AST/SGOT): 23 (01-21 @ 05:01)        01-22    134<L>  |  96  |  12  ----------------------------<  88  3.5   |  23  |  0.63    Ca    8.4      22 Jan 2023 05:17  Phos  2.7     01-22  Mg     2.1     01-22    TPro  6.3  /  Alb  2.8<L>  /  TBili  0.4  /  DBili  x   /  AST  22  /  ALT  22  /  AlkPhos  66  01-22                        9.0    10.73 )-----------( 292      ( 22 Jan 2023 05:18 )             28.6     Medications  MEDICATIONS  (STANDING):  aspirin enteric coated 325 milliGRAM(s) Oral daily  atorvastatin 80 milliGRAM(s) Oral at bedtime  chlorhexidine 2% Cloths 1 Application(s) Topical daily  clopidogrel Tablet 75 milliGRAM(s) Oral daily  divalproex DR 1500 milliGRAM(s) Oral at bedtime  enoxaparin Injectable 30 milliGRAM(s) SubCutaneous every 12 hours  escitalopram 10 milliGRAM(s) Oral daily  furosemide    Tablet 40 milliGRAM(s) Oral daily  gabapentin 100 milliGRAM(s) Oral every 8 hours  insulin glargine Injectable (LANTUS) 42 Unit(s) SubCutaneous at bedtime  insulin lispro (ADMELOG) corrective regimen sliding scale   SubCutaneous three times a day before meals  insulin lispro (ADMELOG) corrective regimen sliding scale   SubCutaneous at bedtime  insulin lispro Injectable (ADMELOG) 17 Unit(s) SubCutaneous three times a day with meals  melatonin 5 milliGRAM(s) Oral at bedtime  metoprolol succinate ER 75 milliGRAM(s) Oral daily  pantoprazole    Tablet 40 milliGRAM(s) Oral before breakfast  polyethylene glycol 3350 17 Gram(s) Oral daily  potassium bicarbonate/potassium citrate 25 milliEquivalent(s) Oral every 1 hour  QUEtiapine 100 milliGRAM(s) Oral at bedtime  senna 2 Tablet(s) Oral at bedtime  sodium chloride 0.9%. 1000 milliLiter(s) (10 mL/Hr) IV Continuous <Continuous>  spironolactone 25 milliGRAM(s) Oral daily  tamsulosin 0.4 milliGRAM(s) Oral at bedtime      Physical Exam  General: Patient appears comfortable.  Vital Signs Last 12 Hrs  T(F): 97.7 (01-22-23 @ 04:05), Max: 97.7 (01-22-23 @ 04:05)  HR: 77 (01-22-23 @ 04:05) (77 - 77)  BP: 121/72 (01-22-23 @ 04:05) (121/72 - 121/72)  BP(mean): --  RR: 18 (01-22-23 @ 04:05) (18 - 18)  SpO2: 94% (01-22-23 @ 04:05) (94% - 94%)  Neck: No palpable thyroid nodules.  CVS: S1S2, No murmurs  Respiratory: No wheezing, no crepitations  GI: Abdomen soft, non tender.  Musculoskeletal:  edema lower extremities.     Diagnostics    Free Thyroxine, Serum: AM Sched. Collection: 15-Marco-2023 06:00 (01-14 @ 14:18)  Thyroid Stimulating Hormone, Serum: AM Sched. Collection: 15-Marco-2023 06:00 (01-14 @ 14:18)

## 2023-01-22 NOTE — PROGRESS NOTE ADULT - ASSESSMENT
57M /w Penicillin allergy, PMH  HTN, HLD, DM2, bipolar disorder, SHIN s/p uvula resection not on CPAP,  known CAD with MI with RCA/OM stents 2013 who presented to his Cardiologist complaining of progressive SOB/LEMOS over the last 4 weeks.  Denies having typical chest pain type symptoms.  Had abnormal stress test and underwent elective cath yesterday on 1/11/23 which revealed EF 65, pLAD 90, mLAD 20, dLAD 80, OM 99, mRCA 95 via RRA access.  Currently patient denies chest pain/sob.  No headache/dizziness.  No abdominal pain/nausea/vomiting.  No bowel/urinary symptoms.  No lower extremity pain/numbness/tingling.  No tooth pain.  Patient states he typically ambulates independently without use of assistive devices and lives with his wife in [place].       Problem/Recommendation - 1:  ·  Problem: CAD, multiple vessel.   ·  Recommendation: S/P CABG x4 vessel   CTS help appreciated .   S/P Extubation . On NC  oxygen.      Problem/Recommendation - 2:  ·  Problem: Uncontrolled  Diabetes.   ·  Recommendation: HgbA1C high.  Endo helping.   On Insulin.   Sugars under good control.     Problem/Recommendation - 3:  ·  Problem: Hypertension.   ·  Recommendation: BP readings fine.   BP meds  per CTICU .      Problem/Recommendation - 4:  ·  Problem: Hyperlipidemia.   ·  Recommendation: Statin.     Problem/Recommendation - 5:  ·  Problem: Bipolar disorder.   ·  Recommendation: Home meds.

## 2023-01-23 ENCOUNTER — TRANSCRIPTION ENCOUNTER (OUTPATIENT)
Age: 58
End: 2023-01-23

## 2023-01-23 VITALS
TEMPERATURE: 98 F | HEART RATE: 75 BPM | SYSTOLIC BLOOD PRESSURE: 127 MMHG | OXYGEN SATURATION: 92 % | RESPIRATION RATE: 18 BRPM | DIASTOLIC BLOOD PRESSURE: 80 MMHG

## 2023-01-23 PROBLEM — I25.10 ATHEROSCLEROTIC HEART DISEASE OF NATIVE CORONARY ARTERY WITHOUT ANGINA PECTORIS: Chronic | Status: ACTIVE | Noted: 2023-01-12

## 2023-01-23 LAB
ALBUMIN SERPL ELPH-MCNC: 3.1 G/DL — LOW (ref 3.3–5)
ALP SERPL-CCNC: 65 U/L — SIGNIFICANT CHANGE UP (ref 40–120)
ALT FLD-CCNC: 19 U/L — SIGNIFICANT CHANGE UP (ref 10–45)
ANION GAP SERPL CALC-SCNC: 10 MMOL/L — SIGNIFICANT CHANGE UP (ref 5–17)
AST SERPL-CCNC: 17 U/L — SIGNIFICANT CHANGE UP (ref 10–40)
BILIRUB SERPL-MCNC: 0.4 MG/DL — SIGNIFICANT CHANGE UP (ref 0.2–1.2)
BUN SERPL-MCNC: 14 MG/DL — SIGNIFICANT CHANGE UP (ref 7–23)
CALCIUM SERPL-MCNC: 8.7 MG/DL — SIGNIFICANT CHANGE UP (ref 8.4–10.5)
CHLORIDE SERPL-SCNC: 99 MMOL/L — SIGNIFICANT CHANGE UP (ref 96–108)
CO2 SERPL-SCNC: 29 MMOL/L — SIGNIFICANT CHANGE UP (ref 22–31)
CREAT SERPL-MCNC: 0.75 MG/DL — SIGNIFICANT CHANGE UP (ref 0.5–1.3)
EGFR: 105 ML/MIN/1.73M2 — SIGNIFICANT CHANGE UP
GLUCOSE BLDC GLUCOMTR-MCNC: 100 MG/DL — HIGH (ref 70–99)
GLUCOSE BLDC GLUCOMTR-MCNC: 148 MG/DL — HIGH (ref 70–99)
GLUCOSE BLDC GLUCOMTR-MCNC: 149 MG/DL — HIGH (ref 70–99)
GLUCOSE SERPL-MCNC: 111 MG/DL — HIGH (ref 70–99)
HCT VFR BLD CALC: 28.8 % — LOW (ref 39–50)
HGB BLD-MCNC: 9.2 G/DL — LOW (ref 13–17)
MCHC RBC-ENTMCNC: 28.4 PG — SIGNIFICANT CHANGE UP (ref 27–34)
MCHC RBC-ENTMCNC: 31.9 GM/DL — LOW (ref 32–36)
MCV RBC AUTO: 88.9 FL — SIGNIFICANT CHANGE UP (ref 80–100)
NRBC # BLD: 1 /100 WBCS — HIGH (ref 0–0)
PLATELET # BLD AUTO: 295 K/UL — SIGNIFICANT CHANGE UP (ref 150–400)
POTASSIUM SERPL-MCNC: 3.8 MMOL/L — SIGNIFICANT CHANGE UP (ref 3.5–5.3)
POTASSIUM SERPL-SCNC: 3.8 MMOL/L — SIGNIFICANT CHANGE UP (ref 3.5–5.3)
PROT SERPL-MCNC: 6.4 G/DL — SIGNIFICANT CHANGE UP (ref 6–8.3)
RBC # BLD: 3.24 M/UL — LOW (ref 4.2–5.8)
RBC # FLD: 16.3 % — HIGH (ref 10.3–14.5)
SODIUM SERPL-SCNC: 138 MMOL/L — SIGNIFICANT CHANGE UP (ref 135–145)
WBC # BLD: 8.64 K/UL — SIGNIFICANT CHANGE UP (ref 3.8–10.5)
WBC # FLD AUTO: 8.64 K/UL — SIGNIFICANT CHANGE UP (ref 3.8–10.5)

## 2023-01-23 PROCEDURE — 85025 COMPLETE CBC W/AUTO DIFF WBC: CPT

## 2023-01-23 PROCEDURE — 82803 BLOOD GASES ANY COMBINATION: CPT

## 2023-01-23 PROCEDURE — 85027 COMPLETE CBC AUTOMATED: CPT

## 2023-01-23 PROCEDURE — 84100 ASSAY OF PHOSPHORUS: CPT

## 2023-01-23 PROCEDURE — 97164 PT RE-EVAL EST PLAN CARE: CPT

## 2023-01-23 PROCEDURE — C1713: CPT

## 2023-01-23 PROCEDURE — 97116 GAIT TRAINING THERAPY: CPT

## 2023-01-23 PROCEDURE — 84436 ASSAY OF TOTAL THYROXINE: CPT

## 2023-01-23 PROCEDURE — 85730 THROMBOPLASTIN TIME PARTIAL: CPT

## 2023-01-23 PROCEDURE — C1769: CPT

## 2023-01-23 PROCEDURE — 87641 MR-STAPH DNA AMP PROBE: CPT

## 2023-01-23 PROCEDURE — 36430 TRANSFUSION BLD/BLD COMPNT: CPT

## 2023-01-23 PROCEDURE — 80061 LIPID PANEL: CPT

## 2023-01-23 PROCEDURE — 86803 HEPATITIS C AB TEST: CPT

## 2023-01-23 PROCEDURE — U0003: CPT

## 2023-01-23 PROCEDURE — 93880 EXTRACRANIAL BILAT STUDY: CPT

## 2023-01-23 PROCEDURE — C9399: CPT

## 2023-01-23 PROCEDURE — 86891 AUTOLOGOUS BLOOD OP SALVAGE: CPT

## 2023-01-23 PROCEDURE — 84439 ASSAY OF FREE THYROXINE: CPT

## 2023-01-23 PROCEDURE — 82330 ASSAY OF CALCIUM: CPT

## 2023-01-23 PROCEDURE — C1729: CPT

## 2023-01-23 PROCEDURE — 36415 COLL VENOUS BLD VENIPUNCTURE: CPT

## 2023-01-23 PROCEDURE — 85576 BLOOD PLATELET AGGREGATION: CPT

## 2023-01-23 PROCEDURE — 86901 BLOOD TYPING SEROLOGIC RH(D): CPT

## 2023-01-23 PROCEDURE — 84134 ASSAY OF PREALBUMIN: CPT

## 2023-01-23 PROCEDURE — P9037: CPT

## 2023-01-23 PROCEDURE — 94010 BREATHING CAPACITY TEST: CPT

## 2023-01-23 PROCEDURE — 84443 ASSAY THYROID STIM HORMONE: CPT

## 2023-01-23 PROCEDURE — P9016: CPT

## 2023-01-23 PROCEDURE — 97166 OT EVAL MOD COMPLEX 45 MIN: CPT

## 2023-01-23 PROCEDURE — 84480 ASSAY TRIIODOTHYRONINE (T3): CPT

## 2023-01-23 PROCEDURE — 82435 ASSAY OF BLOOD CHLORIDE: CPT

## 2023-01-23 PROCEDURE — 82553 CREATINE MB FRACTION: CPT

## 2023-01-23 PROCEDURE — 97112 NEUROMUSCULAR REEDUCATION: CPT

## 2023-01-23 PROCEDURE — 97161 PT EVAL LOW COMPLEX 20 MIN: CPT

## 2023-01-23 PROCEDURE — 81003 URINALYSIS AUTO W/O SCOPE: CPT

## 2023-01-23 PROCEDURE — 82962 GLUCOSE BLOOD TEST: CPT

## 2023-01-23 PROCEDURE — 86850 RBC ANTIBODY SCREEN: CPT

## 2023-01-23 PROCEDURE — 85018 HEMOGLOBIN: CPT

## 2023-01-23 PROCEDURE — 86900 BLOOD TYPING SEROLOGIC ABO: CPT

## 2023-01-23 PROCEDURE — P9045: CPT

## 2023-01-23 PROCEDURE — 94002 VENT MGMT INPAT INIT DAY: CPT

## 2023-01-23 PROCEDURE — 93306 TTE W/DOPPLER COMPLETE: CPT

## 2023-01-23 PROCEDURE — 84295 ASSAY OF SERUM SODIUM: CPT

## 2023-01-23 PROCEDURE — 83036 HEMOGLOBIN GLYCOSYLATED A1C: CPT

## 2023-01-23 PROCEDURE — 80053 COMPREHEN METABOLIC PANEL: CPT

## 2023-01-23 PROCEDURE — 84132 ASSAY OF SERUM POTASSIUM: CPT

## 2023-01-23 PROCEDURE — 93005 ELECTROCARDIOGRAM TRACING: CPT

## 2023-01-23 PROCEDURE — 85520 HEPARIN ASSAY: CPT

## 2023-01-23 PROCEDURE — 83735 ASSAY OF MAGNESIUM: CPT

## 2023-01-23 PROCEDURE — 82565 ASSAY OF CREATININE: CPT

## 2023-01-23 PROCEDURE — 85014 HEMATOCRIT: CPT

## 2023-01-23 PROCEDURE — 85384 FIBRINOGEN ACTIVITY: CPT

## 2023-01-23 PROCEDURE — 85610 PROTHROMBIN TIME: CPT

## 2023-01-23 PROCEDURE — C1889: CPT

## 2023-01-23 PROCEDURE — 97530 THERAPEUTIC ACTIVITIES: CPT

## 2023-01-23 PROCEDURE — 82550 ASSAY OF CK (CPK): CPT

## 2023-01-23 PROCEDURE — 87640 STAPH A DNA AMP PROBE: CPT

## 2023-01-23 PROCEDURE — 86923 COMPATIBILITY TEST ELECTRIC: CPT

## 2023-01-23 PROCEDURE — 71045 X-RAY EXAM CHEST 1 VIEW: CPT

## 2023-01-23 PROCEDURE — 83605 ASSAY OF LACTIC ACID: CPT

## 2023-01-23 PROCEDURE — 84484 ASSAY OF TROPONIN QUANT: CPT

## 2023-01-23 PROCEDURE — 83880 ASSAY OF NATRIURETIC PEPTIDE: CPT

## 2023-01-23 PROCEDURE — U0005: CPT

## 2023-01-23 PROCEDURE — C1751: CPT

## 2023-01-23 PROCEDURE — 82947 ASSAY GLUCOSE BLOOD QUANT: CPT

## 2023-01-23 PROCEDURE — P9100: CPT

## 2023-01-23 RX ORDER — ASPIRIN/CALCIUM CARB/MAGNESIUM 324 MG
1 TABLET ORAL
Qty: 0 | Refills: 0 | DISCHARGE

## 2023-01-23 RX ORDER — TAMSULOSIN HYDROCHLORIDE 0.4 MG/1
1 CAPSULE ORAL
Qty: 30 | Refills: 0
Start: 2023-01-23 | End: 2023-02-21

## 2023-01-23 RX ORDER — OXYCODONE HYDROCHLORIDE 5 MG/1
1 TABLET ORAL
Qty: 28 | Refills: 0
Start: 2023-01-23 | End: 2023-01-29

## 2023-01-23 RX ORDER — SENNA PLUS 8.6 MG/1
2 TABLET ORAL
Qty: 0 | Refills: 0 | DISCHARGE
Start: 2023-01-23

## 2023-01-23 RX ORDER — QUETIAPINE FUMARATE 200 MG/1
1 TABLET, FILM COATED ORAL
Qty: 0 | Refills: 0 | DISCHARGE
Start: 2023-01-23

## 2023-01-23 RX ORDER — SPIRONOLACTONE 25 MG/1
1 TABLET, FILM COATED ORAL
Qty: 30 | Refills: 0
Start: 2023-01-23 | End: 2023-02-21

## 2023-01-23 RX ORDER — ATORVASTATIN CALCIUM 80 MG/1
1 TABLET, FILM COATED ORAL
Qty: 30 | Refills: 0
Start: 2023-01-23 | End: 2023-02-21

## 2023-01-23 RX ORDER — METOPROLOL TARTRATE 50 MG
3 TABLET ORAL
Qty: 90 | Refills: 0
Start: 2023-01-23 | End: 2023-02-21

## 2023-01-23 RX ORDER — ESCITALOPRAM OXALATE 10 MG/1
1 TABLET, FILM COATED ORAL
Qty: 0 | Refills: 0 | DISCHARGE

## 2023-01-23 RX ORDER — INSULIN GLARGINE 100 [IU]/ML
42 INJECTION, SOLUTION SUBCUTANEOUS
Qty: 0 | Refills: 0 | DISCHARGE
Start: 2023-01-23

## 2023-01-23 RX ORDER — ACETAMINOPHEN 500 MG
2 TABLET ORAL
Qty: 0 | Refills: 0 | DISCHARGE
Start: 2023-01-23

## 2023-01-23 RX ORDER — FUROSEMIDE 40 MG
1 TABLET ORAL
Qty: 0 | Refills: 0 | DISCHARGE

## 2023-01-23 RX ORDER — CLOPIDOGREL BISULFATE 75 MG/1
1 TABLET, FILM COATED ORAL
Qty: 30 | Refills: 0
Start: 2023-01-23 | End: 2023-02-21

## 2023-01-23 RX ORDER — AMLODIPINE BESYLATE 2.5 MG/1
1 TABLET ORAL
Qty: 0 | Refills: 0 | DISCHARGE

## 2023-01-23 RX ORDER — FUROSEMIDE 40 MG
1 TABLET ORAL
Qty: 30 | Refills: 0
Start: 2023-01-23 | End: 2023-02-21

## 2023-01-23 RX ORDER — POTASSIUM CHLORIDE 20 MEQ
20 PACKET (EA) ORAL ONCE
Refills: 0 | Status: COMPLETED | OUTPATIENT
Start: 2023-01-23 | End: 2023-01-23

## 2023-01-23 RX ORDER — ESCITALOPRAM OXALATE 10 MG/1
1 TABLET, FILM COATED ORAL
Qty: 0 | Refills: 0 | DISCHARGE
Start: 2023-01-23

## 2023-01-23 RX ORDER — ASPIRIN/CALCIUM CARB/MAGNESIUM 324 MG
1 TABLET ORAL
Qty: 30 | Refills: 0
Start: 2023-01-23 | End: 2023-02-21

## 2023-01-23 RX ADMIN — Medication 17 UNIT(S): at 17:04

## 2023-01-23 RX ADMIN — Medication 17 UNIT(S): at 08:56

## 2023-01-23 RX ADMIN — Medication 17 UNIT(S): at 11:41

## 2023-01-23 RX ADMIN — CLOPIDOGREL BISULFATE 75 MILLIGRAM(S): 75 TABLET, FILM COATED ORAL at 11:42

## 2023-01-23 RX ADMIN — OXYCODONE HYDROCHLORIDE 10 MILLIGRAM(S): 5 TABLET ORAL at 12:57

## 2023-01-23 RX ADMIN — OXYCODONE HYDROCHLORIDE 10 MILLIGRAM(S): 5 TABLET ORAL at 08:34

## 2023-01-23 RX ADMIN — ESCITALOPRAM OXALATE 10 MILLIGRAM(S): 10 TABLET, FILM COATED ORAL at 11:41

## 2023-01-23 RX ADMIN — OXYCODONE HYDROCHLORIDE 10 MILLIGRAM(S): 5 TABLET ORAL at 13:27

## 2023-01-23 RX ADMIN — OXYCODONE HYDROCHLORIDE 10 MILLIGRAM(S): 5 TABLET ORAL at 08:04

## 2023-01-23 RX ADMIN — OXYCODONE HYDROCHLORIDE 10 MILLIGRAM(S): 5 TABLET ORAL at 04:56

## 2023-01-23 RX ADMIN — CHLORHEXIDINE GLUCONATE 1 APPLICATION(S): 213 SOLUTION TOPICAL at 15:59

## 2023-01-23 RX ADMIN — Medication 81 MILLIGRAM(S): at 11:41

## 2023-01-23 RX ADMIN — Medication 75 MILLIGRAM(S): at 15:57

## 2023-01-23 RX ADMIN — ENOXAPARIN SODIUM 30 MILLIGRAM(S): 100 INJECTION SUBCUTANEOUS at 17:04

## 2023-01-23 RX ADMIN — Medication 20 MILLIEQUIVALENT(S): at 10:06

## 2023-01-23 RX ADMIN — ENOXAPARIN SODIUM 30 MILLIGRAM(S): 100 INJECTION SUBCUTANEOUS at 05:52

## 2023-01-23 RX ADMIN — SPIRONOLACTONE 25 MILLIGRAM(S): 25 TABLET, FILM COATED ORAL at 05:51

## 2023-01-23 RX ADMIN — Medication 40 MILLIGRAM(S): at 05:51

## 2023-01-23 RX ADMIN — PANTOPRAZOLE SODIUM 40 MILLIGRAM(S): 20 TABLET, DELAYED RELEASE ORAL at 05:53

## 2023-01-23 RX ADMIN — OXYCODONE HYDROCHLORIDE 10 MILLIGRAM(S): 5 TABLET ORAL at 05:27

## 2023-01-23 NOTE — DISCHARGE NOTE PROVIDER - PROVIDER TOKENS
PROVIDER:[TOKEN:[[id number]]],PROVIDER:[TOKEN:[[id number]]],PROVIDER:[TOKEN:[[id number]]]

## 2023-01-23 NOTE — DISCHARGE NOTE PROVIDER - NSDCPNSUBOBJ_GEN_ALL_CORE
VSS  tele: RSR   neuro: alert and oriented-3; MISHRA-4; no focal neuro deficits noted  midsternal incision cdi juan carlos- sternum stable  lungs clear; diminished at the bases; RR easy,unlabored  +bs nt nd + bm; neg n/v/d; obese abdomen  gu: watkins d/c this am; pt voided s/p removal; pvr 22 cc noted   extrem: neg calf tenderness, left SVG site cdi juan carlos; +1 LE edema; ppp bilaterally    Discharge pt home today 1/23  as per DR. Hou

## 2023-01-23 NOTE — DISCHARGE NOTE PROVIDER - NSDCCPCAREPLAN_GEN_ALL_CORE_FT
PRINCIPAL DISCHARGE DIAGNOSIS  Diagnosis: S/P CABG x 4  Assessment and Plan of Treatment: shower daily  weigh yourself daily  continue current prescriptions as ordered  increase activity as tolerated   no added salt; low fat; low cholesterol, low salt diabetic diet  check blood sugar levels before meals and at bedtime   follow up with Cardiologist, DR. Landrum,  in 1-2 weeks. Call to schedule appointment.  follow up with cardiac surgeon,DR. Hou on Feb 6th at 10:45 am; Call to confirm appointment. [phone]  follow up with endocrinologist, DR. Sequeira in 2-3 weeks; Call to schedule appointment.

## 2023-01-23 NOTE — PROGRESS NOTE ADULT - ASSESSMENT
Patient is a 57M /w Penicillin allergy, PMH  HTN, HLD, DM2, bipolar disorder, SHIN s/p uvula resection not on CPAP,  known CAD with MI with RCA/OM stents 2013 who presented to his Cardiologist complaining of progressive SOB/LEMOS over the last 4 weeks.  Denies having typical chest pain type symptoms.  Had abnormal stress test and underwent elective cath yesterday on 1/11/23 which revealed EF 65, pLAD 90, mLAD 20, dLAD 80, OM 99, mRCA 95 via RRA access.  Now transferred to [facility] for CABG eval /w Dr. Barrientos.  Currently patient denies chest pain/sob.  No headache/dizziness.  No abdominal pain/nausea/vomiting.  No bowel/urinary symptoms.  No lower extremity pain/numbness/tingling.  No tooth pain.  Patient states he typically ambulates independently without use of assistive devices and lives with his wife in [place].      1/12/2023. Patient transferred to [facility] for CABG evaluation /w Dr. Barrientos to review cath images/TTE.    1/13 VSS overnight.  TTE done showing normal LV/RV function. Carotids without significant stenosis.  UA negative.  Started on weight based Lovenox 120mg BID   1/14 VSS TSH elevated 6.69 Dr Sequeira endocrine consulted CP free P2y12 99 repeat in am   1/15 Replete K. Continue with BBlocker, statin asa. P2Y12 pending Plan for CABG Tuesday 1/16 VSS, last dose Lovenox  1/17 s/p CABG x4 (LIMA-LAD, LSVG-OM, LSVG-PDA, LSVG-Diagonal)  iNSULIN INFUSION  eXTUBATED TO HIGH FLOW  aCUTE BLOOD LOSS ANEMIA- PRBC X 1  1/19 diuresis, weaned to NC o2  Bipolar meds resumed  Endo for glucose control  Transferred to sdu  1/20 MS #2 d/c last rosa, ms x 1 this am  D/c pleural as drainage decreases ASmbulte Glucose control  When glucose is stable may transfer to floor  1/21 d/c pw Cont diuresis  Transition to toprol Ra o2  1/22 VSS  ambulating  watkins placed 1/21   Flomax initiated-   likely remove Watkins 1/23  dispotion to home vs rehab ambulating

## 2023-01-23 NOTE — PROGRESS NOTE ADULT - NS ATTEND AMEND GEN_ALL_CORE FT
Chart, labs, vitals, radiology reviewed. Above H&P reviewed and edited where appropriate. Agree with history and physical exam. Agree with assessment and plan. I reviewed the overnight course of events and discussed the care with the patient/ family.  35 minutes spent on total encounter of which more than fifty percent of the encounter was spent counseling and/or coordinating care by the attending physician.

## 2023-01-23 NOTE — DISCHARGE NOTE PROVIDER - HOSPITAL COURSE
Patient is a 57M /w Penicillin allergy, PMH  HTN, HLD, DM2, bipolar disorder, SHIN s/p uvula resection not on CPAP,  known CAD with MI with RCA/OM stents 2013 who presented to his Cardiologist complaining of progressive SOB/LEMOS over the last 4 weeks.  Denies having typical chest pain type symptoms.  Had abnormal stress test and underwent elective cath yesterday on 1/11/23 which revealed EF 65, pLAD 90, mLAD 20, dLAD 80, OM 99, mRCA 95 via RRA access.  Now transferred to [facility] for CABG eval /w Dr. Barrientos.  Currently patient denies chest pain/sob.  No headache/dizziness.  No abdominal pain/nausea/vomiting.  No bowel/urinary symptoms.  No lower extremity pain/numbness/tingling.  No tooth pain.  Patient states he typically ambulates independently without use of assistive devices and lives with his wife in [place].      1/12/2023. Patient transferred to [facility] for CABG evaluation /w Dr. Barrientos to review cath images/TTE.    1/13 VSS overnight.  TTE done showing normal LV/RV function. Carotids without significant stenosis.  UA negative.  Started on weight based Lovenox 120mg BID   1/14 VSS TSH elevated 6.69 Dr Sequeira endocrine consulted CP free P2y12 99 repeat in am   1/15 Replete K. Continue with BBlocker, statin asa. P2Y12 pending Plan for CABG Tuesday 1/16 VSS, last dose Lovenox  1/17 s/p CABG x4 (LIMA-LAD, LSVG-OM, LSVG-PDA, LSVG-Diagonal)  iNSULIN INFUSION  eXTUBATED TO HIGH FLOW  aCUTE BLOOD LOSS ANEMIA- PRBC X 1  1/19 diuresis, weaned to NC o2  Bipolar meds resumed  Endo for glucose control  Transferred to sdu  1/20 MS #2 d/c last rosa, ms x 1 this am  D/c pleural as drainage decreases ASmbulte Glucose control  When glucose is stable may transfer to floor  1/21 d/c pw Cont diuresis  Transition to toprol Ra o2  1/22 VSS  ambulating  watkins placed 1/21   Flomax initiated-   likely remove Watkins 1/23  dispotion to home vs rehab ambulating   1/23 VSS; RSR ; prevena d/c this am; watkins d/c this am pt voided s/p removal; PVR bladder scan 22 cc noted; lantus 42 units and admelog 17 ac as per endo for discharge  discharge pt home w/ pt/rw  today 1/23 as per DR. Hou

## 2023-01-23 NOTE — DISCHARGE NOTE PROVIDER - NSDCFUADDINST_GEN_ALL_CORE_FT
no driving until cleared by Dr. Olea  call Dr. Olea for fever >101  refer to cardiac surgery do and don't checklist  check blood sugar levels before meals and at bedtime- record levels

## 2023-01-23 NOTE — DISCHARGE NOTE PROVIDER - NSDCMRMEDTOKEN_GEN_ALL_CORE_FT
acetaminophen 325 mg oral tablet: 2 tab(s) orally every 6 hours, As needed, Mild Pain (1 - 3)  aspirin 81 mg oral delayed release tablet: 1 tab(s) orally once a day  atorvastatin 80 mg oral tablet: 1 tab(s) orally once a day (at bedtime)  benztropine 2 mg oral tablet: 1 tab(s) orally 2 times a day  clopidogrel 75 mg oral tablet: 1 tab(s) orally once a day  Depakote 500 mg oral delayed release tablet: 3 tab(s) orally once a day (at bedtime)  escitalopram 10 mg oral tablet: 1 tab(s) orally once a day  famotidine 20 mg oral tablet: 1 tab(s) orally once a day (at bedtime)  furosemide 40 mg oral tablet: 1 tab(s) orally once a day  haloperidol 10 mg oral tablet: 2 tab(s) orally once a day (at bedtime)  insulin lispro 100 units/mL injectable solution: 17 unit(s) subcutaneous 3 times a day before meals   Lantus 100 units/mL subcutaneous solution: 42 unit(s) subcutaneous once a day (at bedtime)  metoprolol succinate 25 mg oral tablet, extended release: 3 tab(s) (total 75 mg) orally once a day  oxyCODONE 5 mg oral tablet: 1 tab(s) orally every 6 hours, As Needed -Moderate Pain (4 - 6) MDD:4  pantoprazole 40 mg oral delayed release tablet: 1 tab(s) orally once a day (at bedtime)  QUEtiapine 100 mg oral tablet: 1 tab(s) orally once a day (at bedtime)  senna leaf extract oral tablet: 2 tab(s) orally once a day (at bedtime)  spironolactone 25 mg oral tablet: 1 tab(s) orally once a day  tamsulosin 0.4 mg oral capsule: 1 cap(s) orally once a day (at bedtime)

## 2023-01-23 NOTE — PROGRESS NOTE ADULT - PROBLEM SELECTOR PLAN 1
Will continue current insulin regimen for now. Will continue monitoring FS, log, will Follow up.    DC Plan: May get DC home on current basal bolus insulin if blood sugars remain stable and within acceptable range.  Patient counseled for compliance with consistent low carb diet and exercise as tolerated outpatient.   Will continue monitoring  blood sugars trend, will Follow up. Will continue current insulin regimen for now. Will continue monitoring FS, log, will Follow up.    DC Plan: May get DC home on current basal bolus insulin if blood sugars remain stable and within acceptable range.  Patient counseled for compliance with consistent low carb diet and exercise as tolerated outpatient.   Will continue monitoring  blood sugars trend, will Follow up.    May get discharged on current insulin regimen. Stop all prior PO diabetic medications. Follow up Endocrinology 2 weeks.

## 2023-01-23 NOTE — PROGRESS NOTE ADULT - ASSESSMENT
57M /w Penicillin allergy, PMH  HTN, HLD, DM2, bipolar disorder, SHIN s/p uvula resection not on CPAP,  known CAD with MI with RCA/OM stents 2013 who presented to his Cardiologist complaining of progressive SOB/LEMOS over the last 4 weeks.  Denies having typical chest pain type symptoms.  Had abnormal stress test and underwent elective cath yesterday on 1/11/23 which revealed EF 65, pLAD 90, mLAD 20, dLAD 80, OM 99, mRCA 95 via RRA access.  Currently patient denies chest pain/sob.  No headache/dizziness.  No abdominal pain/nausea/vomiting.  No bowel/urinary symptoms.  No lower extremity pain/numbness/tingling.  No tooth pain.  Patient states he typically ambulates independently without use of assistive devices and lives with his wife in [place].       Problem/Recommendation - 1:  ·  Problem: CAD, multiple vessel.   ·  Recommendation: S/P CABG x4 vessel   CTS help appreciated .   S/P Extubation . On NC  oxygen.      Problem/Recommendation - 2:  ·  Problem: Uncontrolled  Diabetes.   ·  Recommendation: HgbA1C high.  Endo helping.   On Insulin.   Sugars under good control.     Problem/Recommendation - 3:  ·  Problem: Hypertension.   ·  Recommendation: BP readings fine.   BP meds  per CTICU .      Problem/Recommendation - 4:  ·  Problem: Hyperlipidemia.   ·  Recommendation: Statin.     Problem/Recommendation - 5:  ·  Problem: Bipolar disorder.   ·  Recommendation: Home meds. Stable.     DC planning per CTS. D/W daughter in room.

## 2023-01-23 NOTE — DISCHARGE NOTE PROVIDER - CARE PROVIDER_API CALL
Chuck Hou)  Surgery; Thoracic and Cardiac Surgery  [address] [place] [address]  Phone: [phone]  Fax: [phone]  Follow Up Time:     Guerrero Sequeira)  EndocrinologyMetabDiabetes; Internal Medicine  [address] [place] [address]  Phone: [phone]  Fax: [phone]  Follow Up Time:     Kevin Landrum (DO)  Cardiology  [address] [place] [address]  Phone: [phone]  Follow Up Time:

## 2023-01-23 NOTE — PROGRESS NOTE ADULT - SUBJECTIVE AND OBJECTIVE BOX
VITAL SIGNS    Telemetry:    Vital Signs Last 24 Hrs  T(C): 36.6 (23 @ 04:08), Max: 36.8 (23 @ 20:05)  T(F): 97.9 (23 @ 04:08), Max: 98.2 (23 @ 20:05)  HR: 79 (23 @ 04:08) (79 - 85)  BP: 116/72 (23 @ 04:08) (116/72 - 129/75)  RR: 18 (23 @ 04:08) (18 - 18)  SpO2: 92% (23 @ 04:08) (91% - 92%)             07:01  -   @ 07:00  --------------------------------------------------------  IN: 1160 mL / OUT: 1800 mL / NET: -640 mL     07:01  -   @ 11:00  --------------------------------------------------------  IN: 120 mL / OUT: 0 mL / NET: 120 mL       Daily     Daily Weight in k.9 (2023 08:09)  Admit Wt: Drug Dosing Weight  Height (cm): 172.7 (2023 08:04)  Weight (kg): 114 (2023 08:04)  BMI (kg/m2): 38.2 (2023 08:04)  BSA (m2): 2.25 (2023 08:04)    Bilirubin Total, Serum: 0.4 mg/dL ( @ 06:17)    CAPILLARY BLOOD GLUCOSE      POCT Blood Glucose.: 149 mg/dL (2023 07:34)  POCT Blood Glucose.: 141 mg/dL (2023 21:39)  POCT Blood Glucose.: 72 mg/dL (2023 16:25)  POCT Blood Glucose.: 199 mg/dL (2023 11:04)          acetaminophen     Tablet .. 650 milliGRAM(s) Oral every 6 hours PRN  aspirin enteric coated 81 milliGRAM(s) Oral daily  atorvastatin 80 milliGRAM(s) Oral at bedtime  chlorhexidine 2% Cloths 1 Application(s) Topical daily  clopidogrel Tablet 75 milliGRAM(s) Oral daily  divalproex DR 1500 milliGRAM(s) Oral at bedtime  enoxaparin Injectable 30 milliGRAM(s) SubCutaneous every 12 hours  escitalopram 10 milliGRAM(s) Oral daily  furosemide    Tablet 40 milliGRAM(s) Oral daily  insulin glargine Injectable (LANTUS) 42 Unit(s) SubCutaneous at bedtime  insulin lispro (ADMELOG) corrective regimen sliding scale   SubCutaneous three times a day before meals  insulin lispro (ADMELOG) corrective regimen sliding scale   SubCutaneous at bedtime  insulin lispro Injectable (ADMELOG) 17 Unit(s) SubCutaneous three times a day with meals  melatonin 5 milliGRAM(s) Oral at bedtime  metoprolol succinate ER 75 milliGRAM(s) Oral daily  oxyCODONE    IR 5 milliGRAM(s) Oral every 4 hours PRN  oxyCODONE    IR 10 milliGRAM(s) Oral every 4 hours PRN  pantoprazole    Tablet 40 milliGRAM(s) Oral before breakfast  polyethylene glycol 3350 17 Gram(s) Oral daily  QUEtiapine 100 milliGRAM(s) Oral at bedtime  senna 2 Tablet(s) Oral at bedtime  sodium chloride 0.9%. 1000 milliLiter(s) IV Continuous <Continuous>  spironolactone 25 milliGRAM(s) Oral daily  tamsulosin 0.4 milliGRAM(s) Oral at bedtime      PHYSICAL EXAM    Subjective: "Hi.   Neurology: alert and oriented x 3, nonfocal, no gross deficits  CV : tele:  RSR  Sternal Wound :  CDI with dressing , Stable  Lungs: clear. RR easy, unlabored   Abdomen: soft, nontender, nondistended, positive bowel sounds, bowel movement   Neg N/V/D   :  pt voiding without difficulty   Extremities:   MISHRA; edema, neg calf tenderness.   PPP bilaterally      PW:  Chest tubes:

## 2023-01-23 NOTE — PROGRESS NOTE ADULT - ASSESSMENT
Assessment  DMT2: 57y Male with DM T2 with hyperglycemia admitted with chest pain  A1C is 8.1%, patient was on insulin at home, now on large dose basal bolus insulin, blood sugars are stable and within target range, eating full meals.  CAD: CABG, on medications, monitored, asymptomatic.  HTN: On medications, monitored, asymptomatic.                Guerrero Sequeira MD  Cell:  [phone]  Office: [phone]               Assessment  DMT2: 57y Male with DM T2 with hyperglycemia admitted with chest pain  A1C is 8.1%, patient was on insulin at home, now on large dose basal bolus insulin,  blood sugars are stable and within target range, eating full meals.  CAD: CABG, on medications, monitored, asymptomatic.  HTN: On medications, monitored, asymptomatic.                Guerrero Sequeira MD  Cell:  [phone]  Office: [phone]

## 2023-01-23 NOTE — PROGRESS NOTE ADULT - PROBLEM SELECTOR PLAN 4
Continue home regimen  Dr Yarbrough follows patient -outpatient psychologist    Depakote 1500mg HS  Haldol 20mg HS  Seroquel 400mg HS  Cogentin 2mg Q12
Lantus 60units HS increased to 65units today   Admelog Sliding Scale + Ademlog 5units TID /w meals   Will trend BG and adjust regimen as tolerated  SABRINA 6.69 on repeat Dr Sequeira consulted
Lantus 60units HS increased to 65units today   Admelog Sliding Scale + Ademlog 5units TID /w meals   Will trend BG and adjust regimen as tolerated
Lantus 60units HS increased to 65units today   Admelog Sliding Scale + Ademlog 5units TID /w meals   Will trend BG and adjust regimen as tolerated  SABRINA 6.69 on repeat Dr Sequeira consulted
Will continue Atorvastatin 80 mg qHS, primary team FU
Will continue statin, primary team FU
Will continue Atorvastatin 80 mg qHS, primary team FU
Will continue statin, primary team FU
Will continue Atorvastatin 80 mg qHS, primary team FU
Will continue statin, primary team FU
Will continue Atorvastatin 80 mg qHS, primary team FU

## 2023-01-23 NOTE — PROGRESS NOTE ADULT - PROBLEM SELECTOR PROBLEM 3
Hypertension
Urinary retention
Hypertension
Diabetes
Hypertension
Hypertension
Urinary retention

## 2023-01-23 NOTE — PROGRESS NOTE ADULT - PROBLEM SELECTOR PLAN 2
Atorvastatin 80mg HS
S/p CABG   On medications,  no chest pain, stable, monitored and followed up by primary cardiothoracic team/cardiology team
escitalopram 10 milliGRAM(s) Oral daily  QUEtiapine 100 milliGRAM(s) Oral at bedtime  divalproex DR 1500 milliGRAM(s) Oral at bedtime
Continue Norvasc 10mg QD
On medications,  no chest pain, stable, monitored and followed up by primary cardiothoracic team/cardiology team
S/p CABG   On medications,  no chest pain, stable, monitored and followed up by primary cardiothoracic team/cardiology team
S/p CABG   suggest to continue medications, and post op care, monitored and followed up by primary cardiothoracic team/cardiology team
escitalopram 10 milliGRAM(s) Oral daily  QUEtiapine 100 milliGRAM(s) Oral at bedtime  divalproex DR 1500 milliGRAM(s) Oral at bedtime
On medications,  no chest pain, stable, monitored and followed up by primary cardiothoracic team/cardiology team
S/p CABG   On medications,  no chest pain, stable, monitored and followed up by primary cardiothoracic team/cardiology team
On medications,  no chest pain, stable, monitored and followed up by primary cardiothoracic team/cardiology team
Atorvastatin 80mg HS
Atorvastatin 80mg HS

## 2023-01-23 NOTE — PROGRESS NOTE ADULT - PROBLEM SELECTOR PLAN 3
Continue Norvasc 10mg QD  Lopressor 25mg Q12 ordered, pt on Toprol 50mg HS at home
Suggest to continue medications, monitoring, FU primary team recommendations.
Suggest to continue medications, monitoring, FU primary team recommendations.
Continue Norvasc 10mg QD  Lopressor 25mg Q12 ordered, pt on Toprol 50mg HS at home
Suggest to continue medications, monitoring, FU primary team recommendations.
Suggest to continue medications, monitoring, FU primary team recommendations. .
Suggest to continue medications, monitoring, FU primary team recommendations. .
Suggest to continue medications, monitoring, FU primary team recommendations.
Suggest to continue medications, monitoring, FU primary team recommendations.
Suggest to continue medications, monitoring, FU primary team recommendations. .
1/21 watkins reinserted   Flomax
Endocrine following, Dr. Sequeira  HgbA1c 8.2  Continue Lantus 44 HS and Admelog 8 TID premeals  Continue Diabetic diet and FS AC/HS
Suggest to continue medications, monitoring, FU primary team recommendations.
1/21 watkins reinserted   Flomax
Continue Norvasc 10mg QD  Lopressor 25mg Q12 ordered, pt on Toprol 50mg HS at home

## 2023-01-23 NOTE — PROGRESS NOTE ADULT - PROBLEM SELECTOR PROBLEM 2
Hyperlipidemia
Hypertension
Triple vessel coronary artery disease
Hyperlipidemia
Triple vessel coronary artery disease
Bipolar disorder
Triple vessel coronary artery disease
Bipolar disorder
Triple vessel coronary artery disease
Hyperlipidemia

## 2023-01-23 NOTE — PROGRESS NOTE ADULT - PROBLEM SELECTOR PROBLEM 4
Hyperlipidemia
Bipolar disorder
Hyperlipidemia
Hyperlipidemia
Diabetes
Hyperlipidemia
Diabetes
Hyperlipidemia
Diabetes

## 2023-01-23 NOTE — PROGRESS NOTE ADULT - SUBJECTIVE AND OBJECTIVE BOX
Cardiovascular Disease Progress Note  Date of service: 23 @ 08:13    Overnight events: No acute events overnight.  Pt is in no distress. Eating breakfast with daughter at bedside.   Otherwise review of systems negative    Objective Findings:  T(C): 36.6 (23 @ 04:08), Max: 36.8 (23 @ 20:05)  HR: 79 (23 @ 04:08) (79 - 85)  BP: 116/72 (23 @ 04:08) (116/72 - 129/75)  RR: 18 (23 @ 04:08) (18 - 18)  SpO2: 92% (23 @ 04:08) (91% - 92%)  Wt(kg): --  Daily     Daily Weight in k.9 (2023 08:09)      Physical Exam:  Gen: NAD; Patient resting comfortably  HEENT: EOMI, Normocephalic/ atraumatic  CV: RRR, normal S1 + S2, no m/r/g. Dressing in place.   Lungs:  Normal respiratory effort; clear to auscultation bilaterally  Abd: soft, non-tender; bowel sounds present  Ext: No edema; warm and well perfused    Telemetry: Sinus     Laboratory Data:                        9.2    8.64  )-----------( 295      ( 2023 06:17 )             28.8         138  |  99  |  14  ----------------------------<  111<H>  3.8   |  29  |  0.75    Ca    8.7      2023 06:17  Phos  2.7       Mg     2.1         TPro  6.4  /  Alb  3.1<L>  /  TBili  0.4  /  DBili  x   /  AST  17  /  ALT  19  /  AlkPhos  65                Inpatient Medications:  MEDICATIONS  (STANDING):  aspirin enteric coated 81 milliGRAM(s) Oral daily  atorvastatin 80 milliGRAM(s) Oral at bedtime  chlorhexidine 2% Cloths 1 Application(s) Topical daily  clopidogrel Tablet 75 milliGRAM(s) Oral daily  divalproex DR 1500 milliGRAM(s) Oral at bedtime  enoxaparin Injectable 30 milliGRAM(s) SubCutaneous every 12 hours  escitalopram 10 milliGRAM(s) Oral daily  furosemide    Tablet 40 milliGRAM(s) Oral daily  insulin glargine Injectable (LANTUS) 42 Unit(s) SubCutaneous at bedtime  insulin lispro (ADMELOG) corrective regimen sliding scale   SubCutaneous three times a day before meals  insulin lispro (ADMELOG) corrective regimen sliding scale   SubCutaneous at bedtime  insulin lispro Injectable (ADMELOG) 17 Unit(s) SubCutaneous three times a day with meals  melatonin 5 milliGRAM(s) Oral at bedtime  metoprolol succinate ER 75 milliGRAM(s) Oral daily  pantoprazole    Tablet 40 milliGRAM(s) Oral before breakfast  polyethylene glycol 3350 17 Gram(s) Oral daily  QUEtiapine 100 milliGRAM(s) Oral at bedtime  senna 2 Tablet(s) Oral at bedtime  sodium chloride 0.9%. 1000 milliLiter(s) (10 mL/Hr) IV Continuous <Continuous>  spironolactone 25 milliGRAM(s) Oral daily  tamsulosin 0.4 milliGRAM(s) Oral at bedtime      Assessment:  57M /w Penicillin allergy, PMH  HTN, HLD, DM2, bipolar disorder, SHIN s/p uvula resection not on CPAP,  known CAD with MI with RCA/OM stents  who presented to his Cardiologist complaining of progressive SOB/LEMOS over the last 4 weeks.    Plan of Care:    #Multivessel CAD  - Noted on [facility] 2023  - s/p CABG x4 (LIMA-LAD, LSVG- PDA, LSVG-OM, LSVG-Diag)  - TTE  shows normal LV systolic function  - Continue ASA, Plavix statin and BB  - Management as per CTS    #HTN  - BP Acceptable on current regimen      #HLD  - Statin as ordered    #DM  - ISS   - Defer management to primary team      #ACP (advance care planning)-  Advanced care planning was discussed with the patient and daughter.  Risks, benefits and alternatives of medical treatment and procedures were discussed in detail and all questions were answered. 30 additional minutes spent addressing advance care plans.        Over 25 minutes spent on total encounter; more than 50% of the visit was spent counseling and/or coordinating care by the attending physician.      Kevin Landrum,  [facility]  Cardiovascular Disease  [phone]

## 2023-01-23 NOTE — DISCHARGE NOTE NURSING/CASE MANAGEMENT/SOCIAL WORK - NSDCVIVACCINE_GEN_ALL_CORE_FT
pneumococcal polysaccharide PPV23; 04-May-2014 13:09; Ne Garcia (LYNDA); [id number]; IntraMuscular; Deltoid Left.; 0.5 milliLiter(s);

## 2023-01-23 NOTE — DISCHARGE NOTE NURSING/CASE MANAGEMENT/SOCIAL WORK - NSDCPEFALRISK_GEN_ALL_CORE
For information on Fall & Injury Prevention, visit: [email or web address] OR  [email or web address] OR  https://www.cdc.gov/steadi/patient.html

## 2023-01-23 NOTE — PROGRESS NOTE ADULT - PROBLEM SELECTOR PROBLEM 1
Diabetes
S/P CABG x 4
Diabetes
S/P CABG x 4
Triple vessel coronary artery disease
Diabetes
S/P CABG x 4
S/P CABG x 4
Diabetes
S/P CABG x 4
Triple vessel coronary artery disease
Diabetes
Triple vessel coronary artery disease
Triple vessel coronary artery disease

## 2023-01-23 NOTE — PROGRESS NOTE ADULT - PROVIDER SPECIALTY LIST ADULT
CT Surgery
Cardiology
Critical Care
Internal Medicine
Cardiology
Critical Care
Critical Care
Internal Medicine
Internal Medicine
CT Surgery
Cardiology
Cardiology
Internal Medicine
CT Surgery
Endocrinology
Endocrinology
CT Surgery
Endocrinology
Endocrinology
CT Surgery
CT Surgery
Endocrinology
CT Surgery
Endocrinology

## 2023-01-23 NOTE — DISCHARGE NOTE NURSING/CASE MANAGEMENT/SOCIAL WORK - PATIENT PORTAL LINK FT
You can access the FollowMyHealth Patient Portal offered by [facility] by registering at the following website: [email or web address]. By joining [employer]’s FollowMyHealth portal, you will also be able to view your health information using other applications (apps) compatible with our system.

## 2023-01-23 NOTE — PROGRESS NOTE ADULT - SUBJECTIVE AND OBJECTIVE BOX
Chief complaint  Patient is a 57y old  Male who presents with a chief complaint of CABG eval (23 Jan 2023 11:00)    Patient in bed, looks comfortable.    Labs and Fingersticks  CAPILLARY BLOOD GLUCOSE      POCT Blood Glucose.: 149 mg/dL (23 Jan 2023 07:34)  POCT Blood Glucose.: 141 mg/dL (22 Jan 2023 21:39)  POCT Blood Glucose.: 72 mg/dL (22 Jan 2023 16:25)      Anion Gap, Serum: 10 (01-23 @ 06:17)  Anion Gap, Serum: 15 (01-22 @ 05:17)      Calcium, Total Serum: 8.7 (01-23 @ 06:17)  Calcium, Total Serum: 8.4 (01-22 @ 05:17)  Albumin, Serum: 3.1 *L* (01-23 @ 06:17)  Albumin, Serum: 2.8 *L* (01-22 @ 05:17)    Alanine Aminotransferase (ALT/SGPT): 19 (01-23 @ 06:17)  Alanine Aminotransferase (ALT/SGPT): 22 (01-22 @ 05:17)  Alkaline Phosphatase, Serum: 65 (01-23 @ 06:17)  Alkaline Phosphatase, Serum: 66 (01-22 @ 05:17)  Aspartate Aminotransferase (AST/SGOT): 17 (01-23 @ 06:17)  Aspartate Aminotransferase (AST/SGOT): 22 (01-22 @ 05:17)        01-23    138  |  99  |  14  ----------------------------<  111<H>  3.8   |  29  |  0.75    Ca    8.7      23 Jan 2023 06:17  Phos  2.7     01-22  Mg     2.1     01-22    TPro  6.4  /  Alb  3.1<L>  /  TBili  0.4  /  DBili  x   /  AST  17  /  ALT  19  /  AlkPhos  65  01-23                        9.2    8.64  )-----------( 295      ( 23 Jan 2023 06:17 )             28.8     Medications  MEDICATIONS  (STANDING):  aspirin enteric coated 81 milliGRAM(s) Oral daily  atorvastatin 80 milliGRAM(s) Oral at bedtime  chlorhexidine 2% Cloths 1 Application(s) Topical daily  clopidogrel Tablet 75 milliGRAM(s) Oral daily  divalproex DR 1500 milliGRAM(s) Oral at bedtime  enoxaparin Injectable 30 milliGRAM(s) SubCutaneous every 12 hours  escitalopram 10 milliGRAM(s) Oral daily  furosemide    Tablet 40 milliGRAM(s) Oral daily  insulin glargine Injectable (LANTUS) 42 Unit(s) SubCutaneous at bedtime  insulin lispro (ADMELOG) corrective regimen sliding scale   SubCutaneous three times a day before meals  insulin lispro (ADMELOG) corrective regimen sliding scale   SubCutaneous at bedtime  insulin lispro Injectable (ADMELOG) 17 Unit(s) SubCutaneous three times a day with meals  melatonin 5 milliGRAM(s) Oral at bedtime  metoprolol succinate ER 75 milliGRAM(s) Oral daily  pantoprazole    Tablet 40 milliGRAM(s) Oral before breakfast  polyethylene glycol 3350 17 Gram(s) Oral daily  QUEtiapine 100 milliGRAM(s) Oral at bedtime  senna 2 Tablet(s) Oral at bedtime  sodium chloride 0.9%. 1000 milliLiter(s) (10 mL/Hr) IV Continuous <Continuous>  spironolactone 25 milliGRAM(s) Oral daily  tamsulosin 0.4 milliGRAM(s) Oral at bedtime      Physical Exam  General: Patient comfortable in bed  Vital Signs Last 12 Hrs  T(F): 97.9 (01-23-23 @ 04:08), Max: 97.9 (01-23-23 @ 04:08)  HR: 79 (01-23-23 @ 04:08) (79 - 79)  BP: 116/72 (01-23-23 @ 04:08) (116/72 - 116/72)  BP(mean): --  RR: 18 (01-23-23 @ 04:08) (18 - 18)  SpO2: 92% (01-23-23 @ 04:08) (92% - 92%)  CVS: S1S2, No murmurs  Respiratory: No wheezing, no crepitations  GI: Abdomen soft, bowel sounds positive  Musculoskeletal:  edema lower extremities.   Skin: No skin rashes, no ecchymosis, midsternal incision wound vac           Chief complaint  Patient is a 57y old  Male who presents with a chief complaint of CABG eval (23 Jan 2023 11:00)    Patient in bed, looks comfortable.    Labs and Fingersticks  CAPILLARY BLOOD GLUCOSE      POCT Blood Glucose.: 149 mg/dL (23 Jan 2023 07:34)  POCT Blood Glucose.: 141 mg/dL (22 Jan 2023 21:39)  POCT Blood Glucose.: 72 mg/dL (22 Jan 2023 16:25)      Anion Gap, Serum: 10 (01-23 @ 06:17)  Anion Gap, Serum: 15 (01-22 @ 05:17)      Calcium, Total Serum: 8.7 (01-23 @ 06:17)  Calcium, Total Serum: 8.4 (01-22 @ 05:17)  Albumin, Serum: 3.1 *L* (01-23 @ 06:17)  Albumin, Serum: 2.8 *L* (01-22 @ 05:17)    Alanine Aminotransferase (ALT/SGPT): 19 (01-23 @ 06:17)  Alanine Aminotransferase (ALT/SGPT): 22 (01-22 @ 05:17)  Alkaline Phosphatase, Serum: 65 (01-23 @ 06:17)  Alkaline Phosphatase, Serum: 66 (01-22 @ 05:17)  Aspartate Aminotransferase (AST/SGOT): 17 (01-23 @ 06:17)  Aspartate Aminotransferase (AST/SGOT): 22 (01-22 @ 05:17)        01-23    138  |  99  |  14  ----------------------------<  111<H>  3.8   |  29  |  0.75    Ca    8.7      23 Jan 2023 06:17  Phos  2.7     01-22  Mg     2.1     01-22    TPro  6.4  /  Alb  3.1<L>  /  TBili  0.4  /  DBili  x   /  AST  17  /  ALT  19  /  AlkPhos  65  01-23                        9.2    8.64  )-----------( 295      ( 23 Jan 2023 06:17 )             28.8     Medications  MEDICATIONS  (STANDING):  aspirin enteric coated 81 milliGRAM(s) Oral daily  atorvastatin 80 milliGRAM(s) Oral at bedtime  chlorhexidine 2% Cloths 1 Application(s) Topical daily  clopidogrel Tablet 75 milliGRAM(s) Oral daily  divalproex DR 1500 milliGRAM(s) Oral at bedtime  enoxaparin Injectable 30 milliGRAM(s) SubCutaneous every 12 hours  escitalopram 10 milliGRAM(s) Oral daily  furosemide    Tablet 40 milliGRAM(s) Oral daily  insulin glargine Injectable (LANTUS) 42 Unit(s) SubCutaneous at bedtime  insulin lispro (ADMELOG) corrective regimen sliding scale   SubCutaneous three times a day before meals  insulin lispro (ADMELOG) corrective regimen sliding scale   SubCutaneous at bedtime  insulin lispro Injectable (ADMELOG) 17 Unit(s) SubCutaneous three times a day with meals  melatonin 5 milliGRAM(s) Oral at bedtime  metoprolol succinate ER 75 milliGRAM(s) Oral daily  pantoprazole    Tablet 40 milliGRAM(s) Oral before breakfast  polyethylene glycol 3350 17 Gram(s) Oral daily  QUEtiapine 100 milliGRAM(s) Oral at bedtime  senna 2 Tablet(s) Oral at bedtime  sodium chloride 0.9%. 1000 milliLiter(s) (10 mL/Hr) IV Continuous <Continuous>  spironolactone 25 milliGRAM(s) Oral daily  tamsulosin 0.4 milliGRAM(s) Oral at bedtime      Physical Exam  General: Patient comfortable in bed  Vital Signs Last 12 Hrs  T(F): 97.9 (01-23-23 @ 04:08), Max: 97.9 (01-23-23 @ 04:08)  HR: 79 (01-23-23 @ 04:08) (79 - 79)  BP: 116/72 (01-23-23 @ 04:08) (116/72 - 116/72)  BP(mean): --  RR: 18 (01-23-23 @ 04:08) (18 - 18)  SpO2: 92% (01-23-23 @ 04:08) (92% - 92%)  CVS: S1S2, No murmurs  Respiratory: No wheezing, no crepitations  GI: Abdomen soft, bowel sounds positive  Musculoskeletal:  edema lower extremities.   Skin: No skin rashes, no ecchymosis, midsternal incision wound vac

## 2023-01-23 NOTE — PROGRESS NOTE ADULT - SUBJECTIVE AND OBJECTIVE BOX
Date of Service  : 01-23-23     INTERVAL HPI/OVERNIGHT EVENTS: Daughter in room . I feel fine.   Vital Signs Last 24 Hrs  T(C): 36.6 (23 Jan 2023 12:04), Max: 36.8 (22 Jan 2023 20:05)  T(F): 97.9 (23 Jan 2023 12:04), Max: 98.2 (22 Jan 2023 20:05)  HR: 75 (23 Jan 2023 12:04) (75 - 81)  BP: 127/80 (23 Jan 2023 12:04) (116/72 - 127/80)  BP(mean): --  RR: 18 (23 Jan 2023 12:04) (18 - 18)  SpO2: 92% (23 Jan 2023 12:04) (91% - 92%)    Parameters below as of 23 Jan 2023 12:04  Patient On (Oxygen Delivery Method): room air      I&O's Summary    22 Jan 2023 07:01  -  23 Jan 2023 07:00  --------------------------------------------------------  IN: 1160 mL / OUT: 1800 mL / NET: -640 mL    23 Jan 2023 07:01  -  23 Jan 2023 18:43  --------------------------------------------------------  IN: 340 mL / OUT: 800 mL / NET: -460 mL      MEDICATIONS  (STANDING):  aspirin enteric coated 81 milliGRAM(s) Oral daily  atorvastatin 80 milliGRAM(s) Oral at bedtime  chlorhexidine 2% Cloths 1 Application(s) Topical daily  clopidogrel Tablet 75 milliGRAM(s) Oral daily  divalproex DR 1500 milliGRAM(s) Oral at bedtime  enoxaparin Injectable 30 milliGRAM(s) SubCutaneous every 12 hours  escitalopram 10 milliGRAM(s) Oral daily  furosemide    Tablet 40 milliGRAM(s) Oral daily  insulin glargine Injectable (LANTUS) 42 Unit(s) SubCutaneous at bedtime  insulin lispro (ADMELOG) corrective regimen sliding scale   SubCutaneous three times a day before meals  insulin lispro (ADMELOG) corrective regimen sliding scale   SubCutaneous at bedtime  insulin lispro Injectable (ADMELOG) 17 Unit(s) SubCutaneous three times a day with meals  melatonin 5 milliGRAM(s) Oral at bedtime  metoprolol succinate ER 75 milliGRAM(s) Oral daily  pantoprazole    Tablet 40 milliGRAM(s) Oral before breakfast  polyethylene glycol 3350 17 Gram(s) Oral daily  QUEtiapine 100 milliGRAM(s) Oral at bedtime  senna 2 Tablet(s) Oral at bedtime  sodium chloride 0.9%. 1000 milliLiter(s) (10 mL/Hr) IV Continuous <Continuous>  spironolactone 25 milliGRAM(s) Oral daily  tamsulosin 0.4 milliGRAM(s) Oral at bedtime    MEDICATIONS  (PRN):  acetaminophen     Tablet .. 650 milliGRAM(s) Oral every 6 hours PRN Mild Pain (1 - 3)  oxyCODONE    IR 5 milliGRAM(s) Oral every 4 hours PRN Moderate Pain (4 - 6)  oxyCODONE    IR 10 milliGRAM(s) Oral every 4 hours PRN Severe Pain (7 - 10)    LABS:                        9.2    8.64  )-----------( 295      ( 23 Jan 2023 06:17 )             28.8     01-23    138  |  99  |  14  ----------------------------<  111<H>  3.8   |  29  |  0.75    Ca    8.7      23 Jan 2023 06:17  Phos  2.7     01-22  Mg     2.1     01-22    TPro  6.4  /  Alb  3.1<L>  /  TBili  0.4  /  DBili  x   /  AST  17  /  ALT  19  /  AlkPhos  65  01-23        CAPILLARY BLOOD GLUCOSE      POCT Blood Glucose.: 100 mg/dL (23 Jan 2023 16:17)  POCT Blood Glucose.: 148 mg/dL (23 Jan 2023 11:35)  POCT Blood Glucose.: 149 mg/dL (23 Jan 2023 07:34)  POCT Blood Glucose.: 141 mg/dL (22 Jan 2023 21:39)          REVIEW OF SYSTEMS:  CONSTITUTIONAL: No fever, weight loss, or fatigue  EYES: No eye pain, visual disturbances, or discharge  ENMT:  No difficulty hearing, tinnitus, vertigo; No sinus or throat pain  NECK: No pain or stiffness  RESPIRATORY: No cough, wheezing, chills or hemoptysis; No shortness of breath  CARDIOVASCULAR: No chest pain, palpitations, dizziness, or leg swelling  GASTROINTESTINAL: No abdominal or epigastric pain. No nausea, vomiting, or hematemesis; No diarrhea or constipation. No melena or hematochezia.  GENITOURINARY: No dysuria, frequency, hematuria, or incontinence  NEUROLOGICAL: No headaches, memory loss, loss of strength, numbness, or tremors    Consultant(s) Notes Reviewed:  [x ] YES  [ ] NO    PHYSICAL EXAM:  GENERAL: NAD, well-groomed, well-developed, not in any distress ,  HEAD:  Atraumatic, Normocephalic  EYES: EOMI, PERRLA, conjunctiva and sclera clear  ENMT: No tonsillar erythema, exudates, or enlargement; Moist mucous membranes, Good dentition, No lesions  NECK: Supple, No JVD, Normal thyroid  NERVOUS SYSTEM:  Alert & Oriented X3, No focal deficit   CHEST/LUNG: Good air entry bilateral with no  rales, rhonchi, wheezing, or rubs  HEART: Regular rate and rhythm; No murmurs, rubs, or gallops  ABDOMEN: Soft, Nontender, Nondistended; Bowel sounds present  EXTREMITIES:  +edema      Care Discussed with Consultants/Other Providers [ x] YES  [ ] NO

## 2023-01-23 NOTE — PROGRESS NOTE ADULT - REASON FOR ADMISSION
CABG eval
CABG / CAD
CABG eval
CABG eval s/p cabg 4 on 1/17
CABG eval
CABG
CABG eval

## 2023-01-23 NOTE — DISCHARGE NOTE PROVIDER - NSDCFUSCHEDAPPT_GEN_ALL_CORE_FT
Chuck Hou  [facility]  CTSURG [address]  Scheduled Appointment: 02/06/2023

## 2023-01-24 ENCOUNTER — TRANSCRIPTION ENCOUNTER (OUTPATIENT)
Age: 58
End: 2023-01-24

## 2023-01-24 ENCOUNTER — NON-APPOINTMENT (OUTPATIENT)
Age: 58
End: 2023-01-24

## 2023-01-24 DIAGNOSIS — K21.9 GASTRO-ESOPHAGEAL REFLUX DISEASE W/OUT ESOPHAGITIS: ICD-10-CM

## 2023-01-24 RX ORDER — DIVALPROEX SODIUM 500 MG/1
500 TABLET, DELAYED RELEASE ORAL DAILY
Refills: 0 | Status: ACTIVE | COMMUNITY
Start: 2023-01-24

## 2023-01-24 RX ORDER — FUROSEMIDE 40 MG/1
40 TABLET ORAL DAILY
Refills: 0 | Status: ACTIVE | COMMUNITY
Start: 2023-01-24

## 2023-01-24 RX ORDER — ASPIRIN ENTERIC COATED TABLETS 81 MG 81 MG/1
81 TABLET, DELAYED RELEASE ORAL DAILY
Refills: 0 | Status: ACTIVE | COMMUNITY
Start: 2023-01-24

## 2023-01-24 RX ORDER — INSULIN LISPRO 100 [IU]/ML
100 INJECTION, SOLUTION INTRAVENOUS; SUBCUTANEOUS
Refills: 0 | Status: ACTIVE | COMMUNITY
Start: 2023-01-24

## 2023-01-24 RX ORDER — QUETIAPINE FUMARATE 100 MG/1
100 TABLET ORAL
Qty: 30 | Refills: 0 | Status: ACTIVE | COMMUNITY
Start: 2023-01-24 | End: 1900-01-01

## 2023-01-24 RX ORDER — BENZTROPINE MESYLATE 2 MG/1
2 TABLET ORAL TWICE DAILY
Refills: 0 | Status: ACTIVE | COMMUNITY
Start: 2023-01-24

## 2023-01-24 RX ORDER — ATORVASTATIN CALCIUM 80 MG/1
80 TABLET, FILM COATED ORAL
Qty: 30 | Refills: 0 | Status: ACTIVE | COMMUNITY
Start: 2023-01-24

## 2023-01-24 RX ORDER — INSULIN GLARGINE 100 [IU]/ML
100 INJECTION, SOLUTION SUBCUTANEOUS AT BEDTIME
Refills: 0 | Status: ACTIVE | COMMUNITY
Start: 2023-01-24

## 2023-01-24 RX ORDER — FAMOTIDINE 20 MG/1
20 TABLET, FILM COATED ORAL EVERY MORNING
Qty: 30 | Refills: 0 | Status: ACTIVE | COMMUNITY
Start: 2023-01-24 | End: 1900-01-01

## 2023-01-24 RX ORDER — SENNA 8.6 MG/1
8.6 TABLET, FILM COATED ORAL
Qty: 40 | Refills: 0 | Status: ACTIVE | COMMUNITY
Start: 2023-01-24

## 2023-01-24 RX ORDER — ESCITALOPRAM OXALATE 10 MG/1
10 TABLET, FILM COATED ORAL DAILY
Refills: 0 | Status: ACTIVE | COMMUNITY
Start: 2023-01-24

## 2023-01-24 RX ORDER — PANTOPRAZOLE 40 MG/1
40 TABLET, DELAYED RELEASE ORAL AT BEDTIME
Qty: 30 | Refills: 0 | Status: ACTIVE | COMMUNITY
Start: 2023-01-24 | End: 1900-01-01

## 2023-01-24 RX ORDER — HALOPERIDOL 10 MG/1
10 TABLET ORAL AT BEDTIME
Refills: 0 | Status: ACTIVE | COMMUNITY
Start: 2023-01-24

## 2023-01-25 ENCOUNTER — APPOINTMENT (OUTPATIENT)
Dept: CARE COORDINATION | Facility: HOME HEALTH | Age: 58
End: 2023-01-25
Payer: COMMERCIAL

## 2023-01-25 ENCOUNTER — TRANSCRIPTION ENCOUNTER (OUTPATIENT)
Age: 58
End: 2023-01-25

## 2023-01-25 VITALS — WEIGHT: 252 LBS | RESPIRATION RATE: 12 BRPM | BODY MASS INDEX: 38.32 KG/M2

## 2023-01-25 PROCEDURE — 99024 POSTOP FOLLOW-UP VISIT: CPT

## 2023-01-25 NOTE — ASSESSMENT
[FreeTextEntry1] : Pt recovering well at home s/p OHS. Reviewed all medications and dosages with pt understanding. Pt has all medications in home and is taking as prescribed. Pain controlled with current medication regimen. Pt is aware of scheduled & recommended follow up appointments as listed below. Pt c/o cough and wheezing since sx. He was advised to use the incentive spirometer 10x/hr as his daughter reports he was only using it 1x/hr. He is aware to notify [facility] NP if cough and wheezing persist after 1 week as URI testing would be recommended at that time. B/L LE swelling noted. Advised pt to keep LE elevated w/ 1-2 pillows under legs majority of the time & to walk around as tolerated. Pt aware to notify [facility] team of wt gain, which pt currently denies having.\par

## 2023-01-25 NOTE — REVIEW OF SYSTEMS
[Feeling Tired] : feeling tired [Wheezing] : wheezing [Cough] : cough [Sleep Disturbances] : sleep disturbances [Negative] : Endocrine [FreeTextEntry2] : "I did not sleep well last night" [de-identified] : FS: 126 this AM

## 2023-01-25 NOTE — PHYSICAL EXAM
[Neck Appearance] : the appearance of the neck was normal [] : no respiratory distress [Respiration, Rhythm And Depth] : normal respiratory rhythm and effort [Exaggerated Use Of Accessory Muscles For Inspiration] : no accessory muscle use [FreeTextEntry1] : MSI, CT sites and SVG sites without erythema, drainage or warmth, with edges well approximated. BL LE + swelling. [Examination Of The Chest] : the chest was normal in appearance [Chest Visual Inspection Thoracic Asymmetry] : no chest asymmetry [Diminished Respiratory Excursion] : normal chest expansion [Breast Appearance] : normal in appearance [Skin Color & Pigmentation] : normal skin color and pigmentation [Sensation] : the sensory exam was normal to light touch and pinprick [Oriented To Time, Place, And Person] : oriented to person, place, and time [Impaired Insight] : insight and judgment were intact [Affect] : the affect was normal [Mood] : the mood was normal

## 2023-01-25 NOTE — REASON FOR VISIT
[Post Hospitalization] : a post hospitalization visit [Family Member] : family member [FreeTextEntry1] : FOLLOW YOUR HEART - Transitional Care Management Program - [facility]

## 2023-01-25 NOTE — HISTORY OF PRESENT ILLNESS
[Home] : at home, [unfilled] , at the time of the visit. [Other Location: e.g. Home (Enter Location, City,State)___] : at [unfilled] [Family Member] : family member [Verbal consent obtained from patient] : the patient, [unfilled] [FreeTextEntry4] : Margaret (daughter) [FreeTextEntry1] : Patient is a 57M /w Penicillin allergy, PMH  HTN, HLD, DM2, bipolar disorder,\par SHIN s/p uvula resection not on CPAP,  known CAD with MI with RCA/OM stents 2013\par who presented to his Cardiologist complaining of progressive SOB/LEMOS over the\par last 4 weeks.  Denies having typical chest pain type symptoms.  Had abnormal\par stress test and underwent elective cath yesterday on 1/11/23 which revealed EF\par 65, pLAD 90, mLAD 20, dLAD 80, OM 99, mRCA 95 via RRA access.  Now transferred\par to [facility] for CABG eval /w Dr. Barrientos.  Currently patient denies chest pain/sob.\par No headache/dizziness.  No abdominal pain/nausea/vomiting.  No bowel/urinary\par symptoms.  No lower extremity pain/numbness/tingling.  No tooth pain.  Patient\par states he typically ambulates independently without use of assistive devices\par and lives with his wife in [place].\par  \par 1/12/2023. Patient transferred to [facility] for CABG evaluation /w Dr. Barrientos to review cath images/TTE.\par 1/13 VSS overnight.  TTE done showing normal LV/RV function. Carotids without\par significant stenosis.  UA negative.  Started on weight based Lovenox 120mg BID\par 1/14 VSS TSH elevated 6.69 Dr Sequeira endocrine consulted CP free P2y12 99 repeat in am\par 1/15 Replete K. Continue with BBlocker, statin asa. P2Y12 pending Plan for CABG Tuesday\par 1/16 VSS, last dose Lovenox\par 1/17 s/p CABG x4 (LIMA-LAD, LSVG-OM, LSVG-PDA, LSVG-Diagonal)\par Insulin INFUSION\par EXTUBATED TO HIGH FLOW\par ACUTE BLOOD LOSS ANEMIA- PRBC X 1\par 1/19 diuresis, weaned to NC o2\par Bipolar meds resumed\par Endo for glucose control\par Transferred to sdu\par 1/20 MS #2 d/c last rosa, ms x 1 this am\par D/c pleural as drainage decreases ASmbulte Glucose control\par When glucose is stable may transfer to floor\par 1/21 d/c pw Cont diuresis\par Transition to toprol Ra o2\par 1/22 VSS  ambulating  watkins placed 1/21   Flomax initiated-   likely remove\par Watkins 1/23  dispotion to home vs rehab ambulating\par 1/23 VSS; RSR ; prevena d/c this am; watkins d/c this am pt voided s/p\par removal; PVR bladder scan 22 cc noted; lantus 42 units and admelog 17 ac as per\par endo for discharge\par discharge pt home w/ pt/rw  today 1/23 as per DR. Hou\par 1/25- Seen by [facility] NP for a f/u visit. Emotional support and education provided. All questions answered. Pt overall is recovering well. He c/o some wheezing & cough but does not appear or feel in any acute distress.\par

## 2023-01-26 ENCOUNTER — TRANSCRIPTION ENCOUNTER (OUTPATIENT)
Age: 58
End: 2023-01-26

## 2023-01-27 ENCOUNTER — TRANSCRIPTION ENCOUNTER (OUTPATIENT)
Age: 58
End: 2023-01-27

## 2023-01-28 ENCOUNTER — TRANSCRIPTION ENCOUNTER (OUTPATIENT)
Age: 58
End: 2023-01-28

## 2023-01-30 ENCOUNTER — TRANSCRIPTION ENCOUNTER (OUTPATIENT)
Age: 58
End: 2023-01-30

## 2023-02-02 ENCOUNTER — TRANSCRIPTION ENCOUNTER (OUTPATIENT)
Age: 58
End: 2023-02-02

## 2023-02-04 ENCOUNTER — TRANSCRIPTION ENCOUNTER (OUTPATIENT)
Age: 58
End: 2023-02-04

## 2023-02-05 ENCOUNTER — TRANSCRIPTION ENCOUNTER (OUTPATIENT)
Age: 58
End: 2023-02-05

## 2023-02-06 ENCOUNTER — TRANSCRIPTION ENCOUNTER (OUTPATIENT)
Age: 58
End: 2023-02-06

## 2023-02-06 ENCOUNTER — APPOINTMENT (OUTPATIENT)
Dept: CARDIOTHORACIC SURGERY | Facility: CLINIC | Age: 58
End: 2023-02-06
Payer: COMMERCIAL

## 2023-02-06 VITALS
RESPIRATION RATE: 16 BRPM | HEIGHT: 68 IN | TEMPERATURE: 97.8 F | BODY MASS INDEX: 36.37 KG/M2 | SYSTOLIC BLOOD PRESSURE: 111 MMHG | DIASTOLIC BLOOD PRESSURE: 72 MMHG | WEIGHT: 240 LBS | OXYGEN SATURATION: 94 % | HEART RATE: 80 BPM

## 2023-02-06 DIAGNOSIS — Z95.1 PRESENCE OF AORTOCORONARY BYPASS GRAFT: ICD-10-CM

## 2023-02-06 DIAGNOSIS — Z09 ENCOUNTER FOR FOLLOW-UP EXAMINATION AFTER COMPLETED TREATMENT FOR CONDITIONS OTHER THAN MALIGNANT NEOPLASM: ICD-10-CM

## 2023-02-06 PROCEDURE — 99024 POSTOP FOLLOW-UP VISIT: CPT

## 2023-02-06 NOTE — REASON FOR VISIT
[Family Member] : family member [de-identified] : Quadruple coronary artery bypass grafting utilizing the left internal mammary artery to left anterior descending with separate reversed saphenous vein grafts to the diagonal, first obtuse marginal, and posterior  descending branch of the right coronary artery [de-identified] : 1/17/23

## 2023-02-06 NOTE — ASSESSMENT
[FreeTextEntry1] : Mr. REZA is a 57 year old male with  past medical history of  HTN, HLD, DM2, bipolar disorder, \par SHIN s/p uvula resection not on CPAP,  known CAD with MI with RCA/OM stents 2013 who presented to his Cardiologist complaining of progressive SOB/LEMOS over the last 4 weeks.  Denies having typical chest pain type symptoms.  Had abnormal stress test and underwent elective cath yesterday on 1/11/23 which revealed EF \par 65, pLAD 90, mLAD 20, dLAD 80, OM 99, mRCA 95 via RRA access.  Now transferred to [facility] for CABG eval /w Dr. Barrientos. \par \par He is S/P Quadruple coronary artery bypass grafting utilizing the left internal mammary artery to left anterior descending with separate reversed saphenous vein grafts to the diagonal, first obtuse marginal, and posterior  descending branch of the right coronary artery on 1/17/2023. Post op course unremarkable. Discharged with  lantus 42 units and admelog 17 ac as per endo. He is here for post op visit. \par \par Today he presents and reports that he is doing well. Blood sugar between 110- 150 mg/dl. Denies any chest pain, shortness of breath, palpitations, dizziness or pedal edema. \par \par Today on exam patient's lungs clear bilaterally, normal sinus rhythm, sternum stable, incision clean, dry and intact. LT SVG site is clean, dry and intact.  No peripheral edema noted. Staples removed today. Instructed patient on importance of optimal glycemic control, daily showering, daily weights, any signs of fever (temperature greater than 101F, chills,  incentive spirometer use, and increase ambulation as tolerated. Instructed to call office with any signs or symptoms of infection or weight gain of 2 or more pounds in 1 day or 3 or more pounds in 1 week.  \par \par \par Plan:\par 1) Continue current medication regimen\par 2) Follow up with cardiologist ( Dr. Justin Cuellar on 2/28/23) and PCP \par 3) May return on as needed basis \par 4) Ambulate as tolerated \par 5) Nutritionist referral \par 6) Continue to increase activity and walk daily as tolerated. Continue to use incentive spirometer. \par 7) Keep legs elevated above heart when resting/sitting/sleeping. \par 8) Call MD if you experience fever, fatigue, dizziness, confusion, syncope, shortness of breath, chest pain not relieved with analgesics, increased redness/drainage from the surgical  incision site\par \par

## 2023-02-06 NOTE — PHYSICAL EXAM
[] : no respiratory distress [Respiration, Rhythm And Depth] : normal respiratory rhythm and effort [Auscultation Breath Sounds / Voice Sounds] : lungs were clear to auscultation bilaterally [Apical Impulse] : the apical impulse was normal [Heart Rate And Rhythm] : heart rate was normal and rhythm regular [Heart Sounds] : normal S1 and S2 [Murmurs] : no murmurs [Clean] : clean [Dry] : dry [Healing Well] : healing well [FreeTextEntry5] : LT G site  [___ +] : bilateral ankle [unfilled]U+ pitting edema

## 2023-02-06 NOTE — CONSULT LETTER
[FreeTextEntry2] : Dr.Rakesh Cuellar [FreeTextEntry3] : Chuck Hou MD\par Attending Surgeon \par [facility]\par  \par Cardiovascular & Thoracic Surgery\par [employer]\par \par

## 2023-02-06 NOTE — END OF VISIT
[FreeTextEntry3] : \par I personally scribed for KAITJAMIEJHONY on Feb 6 2023 12:15PM . \par \par \par \par \par Physician Attestation:\par Documented by LEONID ELMORE acting as a scribe for KAITJAMIEJHONY 02/06/2023 . \par                 All medical record entries made by the Scribe were at my, ROCCO CARBALLO , direction and personally dictated by me on 02/06/2023 . I have reviewed the chart and agree that the record accurately reflects my personal performance of the history, physical exam, assessment and plan\par \par

## 2023-02-17 ENCOUNTER — APPOINTMENT (OUTPATIENT)
Dept: CARDIOTHORACIC SURGERY | Facility: CLINIC | Age: 58
End: 2023-02-17
Payer: MEDICARE

## 2023-02-17 PROCEDURE — 97802 MEDICAL NUTRITION INDIV IN: CPT

## 2023-02-20 ENCOUNTER — TRANSCRIPTION ENCOUNTER (OUTPATIENT)
Age: 58
End: 2023-02-20

## 2023-02-21 ENCOUNTER — TRANSCRIPTION ENCOUNTER (OUTPATIENT)
Age: 58
End: 2023-02-21

## 2023-02-21 RX ORDER — METOPROLOL SUCCINATE 25 MG/1
25 TABLET, EXTENDED RELEASE ORAL
Qty: 90 | Refills: 0 | Status: ACTIVE | COMMUNITY
Start: 2023-01-24

## 2023-02-21 RX ORDER — CLOPIDOGREL BISULFATE 75 MG/1
75 TABLET, FILM COATED ORAL DAILY
Qty: 30 | Refills: 0 | Status: ACTIVE | COMMUNITY
Start: 2023-01-24

## 2023-02-21 RX ORDER — SPIRONOLACTONE 25 MG/1
25 TABLET ORAL DAILY
Qty: 7 | Refills: 0 | Status: ACTIVE | COMMUNITY
Start: 2023-01-24

## 2023-02-21 RX ORDER — TAMSULOSIN HYDROCHLORIDE 0.4 MG/1
0.4 CAPSULE ORAL
Qty: 30 | Refills: 0 | Status: ACTIVE | COMMUNITY
Start: 2023-01-24

## 2023-02-22 ENCOUNTER — TRANSCRIPTION ENCOUNTER (OUTPATIENT)
Age: 58
End: 2023-02-22

## 2023-03-23 ENCOUNTER — APPOINTMENT (OUTPATIENT)
Dept: CV DIAGNOSITCS | Facility: HOSPITAL | Age: 58
End: 2023-03-23

## 2023-04-18 ENCOUNTER — APPOINTMENT (OUTPATIENT)
Dept: CARDIOLOGY | Facility: CLINIC | Age: 58
End: 2023-04-18

## 2024-04-05 NOTE — OCCUPATIONAL THERAPY INITIAL EVALUATION ADULT - LIGHT TOUCH SENSATION, LUE, REHAB EVAL
Time-based billing (NON-critical care) Time-based billing (NON-critical care) Time-based billing (NON-critical care) Time-based billing (NON-critical care) within normal limits

## 2025-04-06 NOTE — PHYSICAL THERAPY INITIAL EVALUATION ADULT - WEIGHT-BEARING RESTRICTIONS: STAND/SIT, REHAB EVAL
-- DO NOT REPLY / DO NOT REPLY ALL --  -- This inbox is not monitored. If this was sent to the wrong provider or department, reroute message to P ECO Reroute pool. --  -- Message is from Engagement Center Operations (ECO) --    General Patient Message: Patient is requesting  home care to be provided from PCP for blistering legs.    Please assist with request       Caller Information       Contact Date/Time Type Contact Phone/Fax    04/06/2025 06:54 PM CDT Phone (Incoming) Lucero Tam [phone]            Alternative phone number: none    Can a detailed message be left? Yes - Voicemail   Patient has been advised the message will be addressed within 2-3 business days.              Copied from CRM #[id number]. Topic: MW Referral/Order - MW Referral/Order Request  >> Apr 6, 2025  6:56 PM Yamile MIRANDA wrote:  Lucero Tam called regarding a Referral (or Service to Order).      New referral/ service-to order requested discussed previously with provider; Selected 'Wrap Up CRM' and created new Telephone Encounter after clicking 'Convert to Clinical Call'. Selected reason for call 'Referral'. Sent Referral message and routed as routine priority per Clinician KB page to appropriate clinician Pool.  
weight-bearing as tolerated
full weight-bearing

## (undated) DEVICE — APPLICATOR Q TIP 6" WOOD STEM

## (undated) DEVICE — URETERAL CATH RED RUBBER 8FR

## (undated) DEVICE — GOWN TRIMAX LG

## (undated) DEVICE — SUT PROLENE 5-0 36" RB-1

## (undated) DEVICE — SOL NORMOSOL-R PH7.4 1000ML

## (undated) DEVICE — SWITCH ARISS TABLE MOUNT EQUAL LEGS 13"

## (undated) DEVICE — SUT PROLENE 7-0 24" BV175-8 MULTIPASS

## (undated) DEVICE — PACK UNIVERSAL CARDIAC

## (undated) DEVICE — MEDTRONIC CLEARVIEW BLOWER MISTER KIT W TUBING SET

## (undated) DEVICE — AORTIC PUNCH 5MM STANDARD HANDLE

## (undated) DEVICE — GLV 7 PROTEXIS (WHITE)

## (undated) DEVICE — SUT POLYSORB 2-0 30" GS-21 UNDYED

## (undated) DEVICE — NDL COUNTER FOAM AND MAGNET 40-70

## (undated) DEVICE — DRSG OPSITE 13.75 X 4"

## (undated) DEVICE — DRSG TEGADERM 4X4.75"

## (undated) DEVICE — SUT PROLENE 7-0 24" BV175-6

## (undated) DEVICE — DRAPE SLUSH / WARMER 44 X 66"

## (undated) DEVICE — SUT PROLENE 3-0 36" SH

## (undated) DEVICE — SUT MONOCRYL 4-0 18" PS-2

## (undated) DEVICE — SUT VICRYL 2-0 27" CT-1 UNDYED

## (undated) DEVICE — BLOWER MISTER AXIUS WITH IV SET

## (undated) DEVICE — SYR LUER LOK 50CC

## (undated) DEVICE — LAP PAD 18 X 18"

## (undated) DEVICE — SYS VEIN HARVESTING VIRTUOSAPH PLUS W/ RADIAL

## (undated) DEVICE — SUT TICRON 4-0 36" CV-331 DA

## (undated) DEVICE — PACING CABLE A/V TEMP SCREW DOWN 6FT

## (undated) DEVICE — STRYKER INTERPULSE HANDPIECE W IRR SUCTION TUBE

## (undated) DEVICE — BLADE SCALPEL SAFETYLOCK #15

## (undated) DEVICE — DRSG PREVENA PEEL & PLACE KIT 20CM

## (undated) DEVICE — MEDTRONIC URCHIN EVO HEART POSITIONER & CANISTER TUBING SET

## (undated) DEVICE — STABILIZER HAND ASSISTANT ATTACHMENT W STABLESOFT 2S

## (undated) DEVICE — DRSG TEGADERM 6"X8"

## (undated) DEVICE — BLADE ACCESS RAIL DEEP

## (undated) DEVICE — PACK CUSTOM W/INSPIRE OXYGENATOR

## (undated) DEVICE — POSITIONER FOAM EGG CRATE ULNAR 2PCS (PINK)

## (undated) DEVICE — SUT PROLENE 8-0 24" BV175-6

## (undated) DEVICE — DRSG OPSITE 2.5 X 2"

## (undated) DEVICE — TUBING SUCTION 20FT

## (undated) DEVICE — BLADE SCALPEL SAFETYLOCK #11

## (undated) DEVICE — NDL HYPO SAFE 18G X 1.5" (PINK)

## (undated) DEVICE — SYR LUER LOK 30CC

## (undated) DEVICE — SUCTION CATH ARGYLE WHISTLE TIP 14FR STRAIGHT PACKED

## (undated) DEVICE — SUT PROLENE 4-0 36" SH

## (undated) DEVICE — NDL BLUNT 18G LOOP VESSEL MAXI WHITE

## (undated) DEVICE — TUBING KIT FAST START ATF 40

## (undated) DEVICE — SUT SOFSILK 0 30" V-20

## (undated) DEVICE — SYR ASEPTO

## (undated) DEVICE — DRSG DERMABOND PRINEO 60CM

## (undated) DEVICE — DRSG CURITY GAUZE SPONGE 4 X 4" 12-PLY

## (undated) DEVICE — SOL IRR BAG NS 0.9% 3000ML

## (undated) DEVICE — CHEST DRAIN PLEUR-EVAC WET/WET ADULT-PEDS SINGLE (QUICK)

## (undated) DEVICE — SUT POLYSORB 0 36" GS-25 UNDYED

## (undated) DEVICE — SYR TB 1CC 25G X 5/8 (BLUE)

## (undated) DEVICE — SENSOR MYOCARDIAL TEMP 15MM

## (undated) DEVICE — SUT SILK 4-0 18" TIES

## (undated) DEVICE — DRSG ACE BANDAGE 6"

## (undated) DEVICE — TUBING TRUWAVE PRESSURE MALE/FEMALE 72"

## (undated) DEVICE — DRAPE 1/2 SHEET 40X57"

## (undated) DEVICE — SWITCH ARISS TABLE MOUNT UNEQUAL LEGS 13"

## (undated) DEVICE — VESSEL LOOP KEY SURG MINI RED 2.4X1.2MM

## (undated) DEVICE — DRAIN CHANNEL 32FR ROUND HUBLESS FULL FLUTED

## (undated) DEVICE — ACROBAT SUV VACUUM OFF PUMP SYSTEM

## (undated) DEVICE — SUT SURGICAL STEEL 6 30" BP-1

## (undated) DEVICE — SUT SILK 0 30" TIES

## (undated) DEVICE — SUT PROLENE 6-0 4-18" BV-1

## (undated) DEVICE — TUBING IV SET MICROCLAVE ADAPTER

## (undated) DEVICE — DRAPE TOWEL BLUE 17" X 24"

## (undated) DEVICE — SPECIMEN CONTAINER 100ML

## (undated) DEVICE — DRAPE 3/4 SHEET W REINFORCEMENT 56X77"

## (undated) DEVICE — FILTER REINFUSION FOR SALVAGED BLOOD DISP

## (undated) DEVICE — SUT POLYSORB 3-0 30" V-20 UNDYED

## (undated) DEVICE — DEFIBRILLATOR PAD PRE-CONNECT ADULT/CHILD

## (undated) DEVICE — STOPCOCK 4-WAY EXT LF

## (undated) DEVICE — POSITIONER CARDIAC BUMP

## (undated) DEVICE — STOPCOCK 4-WAY 2 GANG W SWIVEL MALE LUER LOCK

## (undated) DEVICE — PREP BETADINE SPONGE STICKS

## (undated) DEVICE — SYR LUER LOK 1CC

## (undated) DEVICE — FOLEY TRAY 16FR 5CC LF LUBRISIL ADVANCE TEMP CLOSED

## (undated) DEVICE — SYNOVIS VASCULAR PROBE 1.5MM 15CM

## (undated) DEVICE — AORTIC PUNCH 4.0MM STANDARD HANDLE

## (undated) DEVICE — TOURNIQUET SET 12FR (1 RED, 1 BLUE, 1 SNARE) 7"

## (undated) DEVICE — AORTIC PUNCH 4.0MM LONG LENGTH HANDLE

## (undated) DEVICE — BULLDOG SPRING CLIP 6MM SOFT/SOFT

## (undated) DEVICE — SUT PROLENE 4-0 36" BB

## (undated) DEVICE — SUCTION YANKAUER NO CONTROL VENT

## (undated) DEVICE — PACING CABLE (BROWN) A/V TEMP SCREW DOWN 12FT

## (undated) DEVICE — GOWN LG

## (undated) DEVICE — BIOMET DRILL DRIVER HI TORQUE

## (undated) DEVICE — SUT PROLENE 7-0 4-24" BV-1

## (undated) DEVICE — GLV 8 PROTEXIS (WHITE)

## (undated) DEVICE — SAW BLADE MICROAIRE STERNUM 1X34X9.4MM

## (undated) DEVICE — CONNECTOR STRAIGHT 1/2 X 1/2"

## (undated) DEVICE — SUT STAINLESS STEEL 5 18" SCC

## (undated) DEVICE — SUT PROLENE 5-0 30" RB-2

## (undated) DEVICE — GLV 7.5 PROTEXIS (WHITE)

## (undated) DEVICE — PACK UNIVERSAL CARDIAC SUPPLEMNTAL B

## (undated) DEVICE — SAW BLADE MICROAIRE STERNUM 1.1X50X42MM

## (undated) DEVICE — STAPLER SKIN VISI-STAT 35 WIDE

## (undated) DEVICE — VISITEC 4X4

## (undated) DEVICE — PREP DURAPREP 26CC

## (undated) DEVICE — DRAPE IOBAN 23" X 23"

## (undated) DEVICE — SUT PROLENE 6-0 4-30" C-1

## (undated) DEVICE — DRSG DERMABOND PRINEO 22CM

## (undated) DEVICE — PACING CABLE BI-V TEMP ALLIGATOR CLIP 8FR

## (undated) DEVICE — SUMP PERICARDIAL 20FR 1/4" ADULT

## (undated) DEVICE — VESSEL LOOP MAXI-RED  0.120" X 16"

## (undated) DEVICE — CONNECTOR INTERSEPT "Y" 1/4 X 1/4 X 1/4"

## (undated) DEVICE — DRAIN RESERVOIR FOR JACKSON PRATT 100CC CARDINAL

## (undated) DEVICE — SUT BIOSYN 4-0 18" P-12

## (undated) DEVICE — SUT PROLENE 7-0 24" BV175-8

## (undated) DEVICE — SUT SOFSILK 4-0 24" CV-15

## (undated) DEVICE — VESSEL LOOP MAXI-BLUE 0.120" X 16"

## (undated) DEVICE — CONNECTOR STRAIGHT 3/8 X 1/2"

## (undated) DEVICE — DRAPE MAYO STAND 30"

## (undated) DEVICE — SUT BLUNT SZ 5

## (undated) DEVICE — SUT PLEDGET PRE PUNCH 4.8 X 9.5 X 1.5 MM

## (undated) DEVICE — SUT DOUBLE 6 WIRE STERNAL